# Patient Record
Sex: MALE | Race: WHITE | Employment: PART TIME | URBAN - METROPOLITAN AREA
[De-identification: names, ages, dates, MRNs, and addresses within clinical notes are randomized per-mention and may not be internally consistent; named-entity substitution may affect disease eponyms.]

---

## 2022-11-18 ENCOUNTER — TRANSCRIBE ORDER (OUTPATIENT)
Dept: SCHEDULING | Age: 57
End: 2022-11-18

## 2022-11-18 DIAGNOSIS — K56.699 STRICTURE OF COLON DETERMINED BY ENDOSCOPY (HCC): Primary | ICD-10-CM

## 2022-11-23 ENCOUNTER — HOSPITAL ENCOUNTER (INPATIENT)
Age: 57
LOS: 1 days | Discharge: HOME | DRG: 392 | End: 2022-11-24
Attending: EMERGENCY MEDICINE | Admitting: STUDENT IN AN ORGANIZED HEALTH CARE EDUCATION/TRAINING PROGRAM
Payer: COMMERCIAL

## 2022-11-23 ENCOUNTER — HOSPITAL ENCOUNTER (OUTPATIENT)
Dept: CT IMAGING | Age: 57
Discharge: HOME | End: 2022-11-23
Attending: INTERNAL MEDICINE
Payer: COMMERCIAL

## 2022-11-23 DIAGNOSIS — K57.20 DIVERTICULITIS OF LARGE INTESTINE WITH ABSCESS WITHOUT BLEEDING: Primary | ICD-10-CM

## 2022-11-23 DIAGNOSIS — K56.699 STRICTURE OF COLON DETERMINED BY ENDOSCOPY (HCC): ICD-10-CM

## 2022-11-23 PROBLEM — K57.92 DIVERTICULITIS: Status: ACTIVE | Noted: 2022-11-23

## 2022-11-23 LAB
ALBUMIN SERPL-MCNC: 3.5 G/DL (ref 3.5–5)
ALBUMIN/GLOB SERPL: 0.8 {RATIO} (ref 1.1–2.2)
ALP SERPL-CCNC: 81 U/L (ref 45–117)
ALT SERPL-CCNC: 18 U/L (ref 12–78)
ANION GAP SERPL CALC-SCNC: 9 MMOL/L (ref 5–15)
AST SERPL-CCNC: 20 U/L (ref 15–37)
BASOPHILS # BLD: 0 K/UL (ref 0–0.1)
BASOPHILS NFR BLD: 0 % (ref 0–1)
BILIRUB SERPL-MCNC: 0.4 MG/DL (ref 0.2–1)
BUN SERPL-MCNC: 7 MG/DL (ref 6–20)
BUN/CREAT SERPL: 6 (ref 12–20)
CALCIUM SERPL-MCNC: 9 MG/DL (ref 8.5–10.1)
CHLORIDE SERPL-SCNC: 101 MMOL/L (ref 97–108)
CO2 SERPL-SCNC: 29 MMOL/L (ref 21–32)
CREAT SERPL-MCNC: 1.13 MG/DL (ref 0.7–1.3)
DIFFERENTIAL METHOD BLD: ABNORMAL
EOSINOPHIL # BLD: 0.4 K/UL (ref 0–0.4)
EOSINOPHIL NFR BLD: 4 % (ref 0–7)
ERYTHROCYTE [DISTWIDTH] IN BLOOD BY AUTOMATED COUNT: 12.2 % (ref 11.5–14.5)
GLOBULIN SER CALC-MCNC: 4.6 G/DL (ref 2–4)
GLUCOSE SERPL-MCNC: 95 MG/DL (ref 65–100)
HCT VFR BLD AUTO: 41.6 % (ref 36.6–50.3)
HGB BLD-MCNC: 14 G/DL (ref 12.1–17)
IMM GRANULOCYTES # BLD AUTO: 0.1 K/UL (ref 0–0.04)
IMM GRANULOCYTES NFR BLD AUTO: 1 % (ref 0–0.5)
LYMPHOCYTES # BLD: 1.5 K/UL (ref 0.8–3.5)
LYMPHOCYTES NFR BLD: 16 % (ref 12–49)
MCH RBC QN AUTO: 32.6 PG (ref 26–34)
MCHC RBC AUTO-ENTMCNC: 33.7 G/DL (ref 30–36.5)
MCV RBC AUTO: 97 FL (ref 80–99)
MONOCYTES # BLD: 0.9 K/UL (ref 0–1)
MONOCYTES NFR BLD: 9 % (ref 5–13)
NEUTS SEG # BLD: 6.7 K/UL (ref 1.8–8)
NEUTS SEG NFR BLD: 70 % (ref 32–75)
NRBC # BLD: 0 K/UL (ref 0–0.01)
NRBC BLD-RTO: 0 PER 100 WBC
PLATELET # BLD AUTO: 389 K/UL (ref 150–400)
PMV BLD AUTO: 9.6 FL (ref 8.9–12.9)
POTASSIUM SERPL-SCNC: 4.4 MMOL/L (ref 3.5–5.1)
PROT SERPL-MCNC: 8.1 G/DL (ref 6.4–8.2)
RBC # BLD AUTO: 4.29 M/UL (ref 4.1–5.7)
SODIUM SERPL-SCNC: 139 MMOL/L (ref 136–145)
WBC # BLD AUTO: 9.5 K/UL (ref 4.1–11.1)

## 2022-11-23 PROCEDURE — 74011000250 HC RX REV CODE- 250: Performed by: INTERNAL MEDICINE

## 2022-11-23 PROCEDURE — 36415 COLL VENOUS BLD VENIPUNCTURE: CPT

## 2022-11-23 PROCEDURE — 80053 COMPREHEN METABOLIC PANEL: CPT

## 2022-11-23 PROCEDURE — 74011000258 HC RX REV CODE- 258: Performed by: EMERGENCY MEDICINE

## 2022-11-23 PROCEDURE — 99285 EMERGENCY DEPT VISIT HI MDM: CPT

## 2022-11-23 PROCEDURE — 74011000636 HC RX REV CODE- 636: Performed by: INTERNAL MEDICINE

## 2022-11-23 PROCEDURE — 74011250636 HC RX REV CODE- 250/636: Performed by: EMERGENCY MEDICINE

## 2022-11-23 PROCEDURE — 74177 CT ABD & PELVIS W/CONTRAST: CPT

## 2022-11-23 PROCEDURE — 65270000029 HC RM PRIVATE

## 2022-11-23 PROCEDURE — 85025 COMPLETE CBC W/AUTO DIFF WBC: CPT

## 2022-11-23 PROCEDURE — 96365 THER/PROPH/DIAG IV INF INIT: CPT

## 2022-11-23 RX ORDER — BARIUM SULFATE 20 MG/ML
900 SUSPENSION ORAL ONCE
Status: COMPLETED | OUTPATIENT
Start: 2022-11-23 | End: 2022-11-23

## 2022-11-23 RX ADMIN — IOPAMIDOL 100 ML: 755 INJECTION, SOLUTION INTRAVENOUS at 16:48

## 2022-11-23 RX ADMIN — PIPERACILLIN AND TAZOBACTAM 4.5 G: 4; .5 INJECTION, POWDER, FOR SOLUTION INTRAVENOUS at 18:52

## 2022-11-23 RX ADMIN — BARIUM SULFATE 450 ML: 20 SUSPENSION ORAL at 16:48

## 2022-11-24 VITALS
WEIGHT: 199 LBS | HEIGHT: 69 IN | DIASTOLIC BLOOD PRESSURE: 85 MMHG | RESPIRATION RATE: 18 BRPM | HEART RATE: 75 BPM | SYSTOLIC BLOOD PRESSURE: 132 MMHG | BODY MASS INDEX: 29.47 KG/M2 | OXYGEN SATURATION: 93 % | TEMPERATURE: 98.3 F

## 2022-11-24 LAB
ALBUMIN SERPL-MCNC: 3 G/DL (ref 3.5–5)
ALBUMIN/GLOB SERPL: 0.7 {RATIO} (ref 1.1–2.2)
ALP SERPL-CCNC: 78 U/L (ref 45–117)
ALT SERPL-CCNC: 20 U/L (ref 12–78)
ANION GAP SERPL CALC-SCNC: 5 MMOL/L (ref 5–15)
AST SERPL-CCNC: 14 U/L (ref 15–37)
BASOPHILS # BLD: 0.1 K/UL (ref 0–0.1)
BASOPHILS NFR BLD: 1 % (ref 0–1)
BILIRUB SERPL-MCNC: 0.2 MG/DL (ref 0.2–1)
BUN SERPL-MCNC: 8 MG/DL (ref 6–20)
BUN/CREAT SERPL: 7 (ref 12–20)
CALCIUM SERPL-MCNC: 8.6 MG/DL (ref 8.5–10.1)
CHLORIDE SERPL-SCNC: 107 MMOL/L (ref 97–108)
CO2 SERPL-SCNC: 28 MMOL/L (ref 21–32)
CREAT SERPL-MCNC: 1.12 MG/DL (ref 0.7–1.3)
DIFFERENTIAL METHOD BLD: ABNORMAL
EOSINOPHIL # BLD: 0.5 K/UL (ref 0–0.4)
EOSINOPHIL NFR BLD: 7 % (ref 0–7)
ERYTHROCYTE [DISTWIDTH] IN BLOOD BY AUTOMATED COUNT: 12.2 % (ref 11.5–14.5)
GLOBULIN SER CALC-MCNC: 4.3 G/DL (ref 2–4)
GLUCOSE SERPL-MCNC: 120 MG/DL (ref 65–100)
HCT VFR BLD AUTO: 41.7 % (ref 36.6–50.3)
HGB BLD-MCNC: 13.9 G/DL (ref 12.1–17)
IMM GRANULOCYTES # BLD AUTO: 0 K/UL (ref 0–0.04)
IMM GRANULOCYTES NFR BLD AUTO: 0 % (ref 0–0.5)
LYMPHOCYTES # BLD: 1.4 K/UL (ref 0.8–3.5)
LYMPHOCYTES NFR BLD: 17 % (ref 12–49)
MCH RBC QN AUTO: 32.5 PG (ref 26–34)
MCHC RBC AUTO-ENTMCNC: 33.3 G/DL (ref 30–36.5)
MCV RBC AUTO: 97.4 FL (ref 80–99)
MONOCYTES # BLD: 0.9 K/UL (ref 0–1)
MONOCYTES NFR BLD: 11 % (ref 5–13)
NEUTS SEG # BLD: 5.2 K/UL (ref 1.8–8)
NEUTS SEG NFR BLD: 64 % (ref 32–75)
NRBC # BLD: 0 K/UL (ref 0–0.01)
NRBC BLD-RTO: 0 PER 100 WBC
PLATELET # BLD AUTO: 365 K/UL (ref 150–400)
PMV BLD AUTO: 9.2 FL (ref 8.9–12.9)
POTASSIUM SERPL-SCNC: 4 MMOL/L (ref 3.5–5.1)
PROT SERPL-MCNC: 7.3 G/DL (ref 6.4–8.2)
RBC # BLD AUTO: 4.28 M/UL (ref 4.1–5.7)
SODIUM SERPL-SCNC: 140 MMOL/L (ref 136–145)
WBC # BLD AUTO: 8.2 K/UL (ref 4.1–11.1)

## 2022-11-24 PROCEDURE — 74011250636 HC RX REV CODE- 250/636: Performed by: FAMILY MEDICINE

## 2022-11-24 PROCEDURE — 36415 COLL VENOUS BLD VENIPUNCTURE: CPT

## 2022-11-24 PROCEDURE — 80053 COMPREHEN METABOLIC PANEL: CPT

## 2022-11-24 PROCEDURE — 99221 1ST HOSP IP/OBS SF/LOW 40: CPT | Performed by: SURGERY

## 2022-11-24 PROCEDURE — 85025 COMPLETE CBC W/AUTO DIFF WBC: CPT

## 2022-11-24 PROCEDURE — 74011000258 HC RX REV CODE- 258: Performed by: FAMILY MEDICINE

## 2022-11-24 PROCEDURE — 74011000250 HC RX REV CODE- 250: Performed by: FAMILY MEDICINE

## 2022-11-24 PROCEDURE — 74011250637 HC RX REV CODE- 250/637: Performed by: FAMILY MEDICINE

## 2022-11-24 RX ORDER — LEVOFLOXACIN 750 MG/1
750 TABLET ORAL DAILY
Qty: 7 TABLET | Refills: 0 | Status: SHIPPED | OUTPATIENT
Start: 2022-11-24

## 2022-11-24 RX ORDER — SODIUM CHLORIDE 9 MG/ML
75 INJECTION, SOLUTION INTRAVENOUS CONTINUOUS
Status: DISCONTINUED | OUTPATIENT
Start: 2022-11-24 | End: 2022-11-24 | Stop reason: HOSPADM

## 2022-11-24 RX ORDER — IBUPROFEN 200 MG
1 TABLET ORAL DAILY
Status: DISCONTINUED | OUTPATIENT
Start: 2022-11-24 | End: 2022-11-24 | Stop reason: HOSPADM

## 2022-11-24 RX ORDER — SODIUM CHLORIDE 0.9 % (FLUSH) 0.9 %
5-40 SYRINGE (ML) INJECTION EVERY 8 HOURS
Status: DISCONTINUED | OUTPATIENT
Start: 2022-11-24 | End: 2022-11-24 | Stop reason: HOSPADM

## 2022-11-24 RX ORDER — SODIUM CHLORIDE 0.9 % (FLUSH) 0.9 %
5-40 SYRINGE (ML) INJECTION AS NEEDED
Status: DISCONTINUED | OUTPATIENT
Start: 2022-11-24 | End: 2022-11-24 | Stop reason: HOSPADM

## 2022-11-24 RX ORDER — METRONIDAZOLE 500 MG/1
500 TABLET ORAL 3 TIMES DAILY
Qty: 21 TABLET | Refills: 0 | Status: SHIPPED | OUTPATIENT
Start: 2022-11-24 | End: 2022-12-01

## 2022-11-24 RX ADMIN — SODIUM CHLORIDE 75 ML/HR: 9 INJECTION, SOLUTION INTRAVENOUS at 04:54

## 2022-11-24 RX ADMIN — SODIUM CHLORIDE, PRESERVATIVE FREE 10 ML: 5 INJECTION INTRAVENOUS at 04:58

## 2022-11-24 RX ADMIN — PIPERACILLIN AND TAZOBACTAM 3.38 G: 3; .375 INJECTION, POWDER, FOR SOLUTION INTRAVENOUS at 04:57

## 2022-11-24 NOTE — DISCHARGE INSTRUCTIONS
Discharge Instructions       PATIENT ID: Cuong Lyle  MRN: [id number]   YOB: 1965    DATE OF ADMISSION: 11/23/2022  5:23 PM    DATE OF DISCHARGE: 11/24/22    PRIMARY CARE PROVIDER: Silverio Escalera DO     ATTENDING PHYSICIAN: Sebastien Cason MD   DISCHARGING PROVIDER: Sebastien Cason MD    To contact this individual call [phone] and ask the  to page. If unavailable ask to be transferred the Adult Hospitalist Department. DISCHARGE DIAGNOSES Intraabdominal colonic abscess     CONSULTATIONS:  IP CONSULT TO COLORECTAL SURGERY  IP CONSULT TO INTERVENTIONAL RADIOLOGY    PROCEDURES/SURGERIES: * No surgery found *    PENDING TEST RESULTS:   At the time of discharge the following test results are still pending: None    FOLLOW UP APPOINTMENTS:   Gastroenterology, PCP     ADDITIONAL CARE RECOMMENDATIONS: You were admitted due to finding of an abscess near your colon in setting of recent fevers. Fortunately, you did not have fevers or signs of sepsis during your admission. Your imaging was evaluated by both Surgery and Interventional Radiology, who determined that your abscess is too small to drain. It is possible that the abscess has been present for a while and is actually improving, as your fevers and pain have improved. We feel that you are safe for discharge to home with close follow-up with your GI doctor and PCP. You will be discharged on antibiotics. You need a repeat CT abdomen in 1 week for reassess of the size of your abscess. Of course, if you develop fever, pain, or new symptoms you need to return for immediate evaluation. DIET: Resume previous diet    ACTIVITY: Activity as tolerated and no driving for today    WOUND CARE: None    EQUIPMENT needed: None      DISCHARGE MEDICATIONS:   See Medication Reconciliation Form    It is important that you take the medication exactly as they are prescribed.    Keep your medication in the bottles provided by the pharmacist and keep a list of the medication names, dosages, and times to be taken in your wallet. Do not take other medications without consulting your doctor. NOTIFY YOUR PHYSICIAN FOR ANY OF THE FOLLOWING:   Fever over 101 degrees for 24 hours. Chest pain, shortness of breath, fever, chills, nausea, vomiting, diarrhea, change in mentation, falling, weakness, bleeding. Severe pain or pain not relieved by medications. Or, any other signs or symptoms that you may have questions about.         Signed:   Ester Candelario MD  11/24/2022  12:16 PM

## 2022-11-24 NOTE — CONSULTS
Surgery Consult    Subjective:      Snehal Khan is a 65 y.o. male who is being seen for evaluation of diverticular abscess. The patient states that he was treated empirically for diverticulitis back in August.  He then went for a colonoscopy on 11/8/2022 and was diagnosed with diverticulosis and with some sort of colonic stricture. He states that they were unable to get through the colon. After the colonoscopy he developed abdominal pain. He was then placed on Cipro and Flagyl. He finished a 10-day course of that and was speaking with the gastroenterologist where he noted that he was having intermittent fevers at night. The gastroenterologist told him he needed to go get a CT scan. He had a CT scan yesterday that showed a diverticular abscess. He has never had anything similar to this in the past.        Patient Active Problem List    Diagnosis Date Noted    Diverticulitis 11/23/2022     Past Medical History:   Diagnosis Date    Hypercholesterolemia     Psoriasis       Past Surgical History:   Procedure Laterality Date    HX OTHER SURGICAL      Laparoscopic inguinal hernia repair      Social History     Tobacco Use    Smoking status: Not on file    Smokeless tobacco: Not on file   Substance Use Topics    Alcohol use: Not on file      No family history on file. Current Facility-Administered Medications   Medication Dose Route Frequency    sodium chloride (NS) flush 5-40 mL  5-40 mL IntraVENous Q8H    sodium chloride (NS) flush 5-40 mL  5-40 mL IntraVENous PRN    piperacillin-tazobactam (ZOSYN) 3.375 g in 0.9% sodium chloride (MBP/ADV) 100 mL MBP  3.375 g IntraVENous Q8H    0.9% sodium chloride infusion  75 mL/hr IntraVENous CONTINUOUS    nicotine (NICODERM CQ) 21 mg/24 hr patch 1 Patch  1 Patch TransDERmal DAILY      No Known Allergies    Review of Systems:    Pertinent items are noted in the History of Present Illness.     Objective:      Visit Vitals  /85   Pulse 75   Temp 98.3 °F (36.8 °C)   Resp 18   Ht 5' 9\" (1.753 m)   Wt 90.3 kg (199 lb)   SpO2 93%   BMI 29.39 kg/m²       Physical Exam:  GENERAL: alert, cooperative, no distress, appears stated age, EYE: negative, THROAT & NECK: normal, LUNG: clear to auscultation bilaterally, HEART: regular rate and rhythm, ABDOMEN: Soft with mild tenderness left lower quadrant no rebound or guarding, EXTREMITIES:  no edema, SKIN: Normal., NEUROLOGIC: negative, PSYCH: non focal    Imaging:  images and reports reviewed  CT- IMPRESSION  Colonic diverticulitis with associated left iliac fossa pericolonic abscess  along the proximal sigmoid colon measuring up to 2.7 x 3.3 cm. The patient was advised to present to the ED for immediate evaluation  Lab/Data Review: All lab results for the last 24 hours reviewed. Recent Results (from the past 24 hour(s))   CBC WITH AUTOMATED DIFF    Collection Time: 11/23/22  6:54 PM   Result Value Ref Range    WBC 9.5 4.1 - 11.1 K/uL    RBC 4.29 4. 10 - 5.70 M/uL    HGB 14.0 12.1 - 17.0 g/dL    HCT 41.6 36.6 - 50.3 %    MCV 97.0 80.0 - 99.0 FL    MCH 32.6 26.0 - 34.0 PG    MCHC 33.7 30.0 - 36.5 g/dL    RDW 12.2 11.5 - 14.5 %    PLATELET 532 696 - 772 K/uL    MPV 9.6 8.9 - 12.9 FL    NRBC 0.0 0  WBC    ABSOLUTE NRBC 0.00 0.00 - 0.01 K/uL    NEUTROPHILS 70 32 - 75 %    LYMPHOCYTES 16 12 - 49 %    MONOCYTES 9 5 - 13 %    EOSINOPHILS 4 0 - 7 %    BASOPHILS 0 0 - 1 %    IMMATURE GRANULOCYTES 1 (H) 0.0 - 0.5 %    ABS. NEUTROPHILS 6.7 1.8 - 8.0 K/UL    ABS. LYMPHOCYTES 1.5 0.8 - 3.5 K/UL    ABS. MONOCYTES 0.9 0.0 - 1.0 K/UL    ABS. EOSINOPHILS 0.4 0.0 - 0.4 K/UL    ABS. BASOPHILS 0.0 0.0 - 0.1 K/UL    ABS. IMM.  GRANS. 0.1 (H) 0.00 - 0.04 K/UL    DF AUTOMATED     METABOLIC PANEL, COMPREHENSIVE    Collection Time: 11/23/22  6:54 PM   Result Value Ref Range    Sodium 139 136 - 145 mmol/L    Potassium 4.4 3.5 - 5.1 mmol/L    Chloride 101 97 - 108 mmol/L    CO2 29 21 - 32 mmol/L    Anion gap 9 5 - 15 mmol/L    Glucose 95 65 - 100 mg/dL    BUN 7 6 - 20 MG/DL    Creatinine 1.13 0.70 - 1.30 MG/DL    BUN/Creatinine ratio 6 (L) 12 - 20      eGFR >60 >60 ml/min/1.73m2    Calcium 9.0 8.5 - 10.1 MG/DL    Bilirubin, total 0.4 0.2 - 1.0 MG/DL    ALT (SGPT) 18 12 - 78 U/L    AST (SGOT) 20 15 - 37 U/L    Alk. phosphatase 81 45 - 117 U/L    Protein, total 8.1 6.4 - 8.2 g/dL    Albumin 3.5 3.5 - 5.0 g/dL    Globulin 4.6 (H) 2.0 - 4.0 g/dL    A-G Ratio 0.8 (L) 1.1 - 2.2     METABOLIC PANEL, COMPREHENSIVE    Collection Time: 11/24/22  3:16 AM   Result Value Ref Range    Sodium 140 136 - 145 mmol/L    Potassium 4.0 3.5 - 5.1 mmol/L    Chloride 107 97 - 108 mmol/L    CO2 28 21 - 32 mmol/L    Anion gap 5 5 - 15 mmol/L    Glucose 120 (H) 65 - 100 mg/dL    BUN 8 6 - 20 MG/DL    Creatinine 1.12 0.70 - 1.30 MG/DL    BUN/Creatinine ratio 7 (L) 12 - 20      eGFR >60 >60 ml/min/1.73m2    Calcium 8.6 8.5 - 10.1 MG/DL    Bilirubin, total 0.2 0.2 - 1.0 MG/DL    ALT (SGPT) 20 12 - 78 U/L    AST (SGOT) 14 (L) 15 - 37 U/L    Alk. phosphatase 78 45 - 117 U/L    Protein, total 7.3 6.4 - 8.2 g/dL    Albumin 3.0 (L) 3.5 - 5.0 g/dL    Globulin 4.3 (H) 2.0 - 4.0 g/dL    A-G Ratio 0.7 (L) 1.1 - 2.2     CBC WITH AUTOMATED DIFF    Collection Time: 11/24/22  3:16 AM   Result Value Ref Range    WBC 8.2 4.1 - 11.1 K/uL    RBC 4.28 4.10 - 5.70 M/uL    HGB 13.9 12.1 - 17.0 g/dL    HCT 41.7 36.6 - 50.3 %    MCV 97.4 80.0 - 99.0 FL    MCH 32.5 26.0 - 34.0 PG    MCHC 33.3 30.0 - 36.5 g/dL    RDW 12.2 11.5 - 14.5 %    PLATELET 284 790 - 783 K/uL    MPV 9.2 8.9 - 12.9 FL    NRBC 0.0 0  WBC    ABSOLUTE NRBC 0.00 0.00 - 0.01 K/uL    NEUTROPHILS 64 32 - 75 %    LYMPHOCYTES 17 12 - 49 %    MONOCYTES 11 5 - 13 %    EOSINOPHILS 7 0 - 7 %    BASOPHILS 1 0 - 1 %    IMMATURE GRANULOCYTES 0 0.0 - 0.5 %    ABS. NEUTROPHILS 5.2 1.8 - 8.0 K/UL    ABS. LYMPHOCYTES 1.4 0.8 - 3.5 K/UL    ABS. MONOCYTES 0.9 0.0 - 1.0 K/UL    ABS. EOSINOPHILS 0.5 (H) 0.0 - 0.4 K/UL    ABS. BASOPHILS 0.1 0.0 - 0.1 K/UL    ABS. IMM. GRANS. 0.0 0.00 - 0.04 K/UL    DF AUTOMATED         Assessment:plan    81-year-old male with diverticular abscess. Unclear exactly when this started. He states that he was feeling well prior to his colonoscopy and then developed pain afterwards. It is possible that he had a perforated tic during the procedure that led to the diverticulitis. He has not had a CT scan before last night. It is unclear when this abscess began and whether or not it is getting better or worse. Have discussed with interventional radiology and this is too small to drain. Would keep patient on antibiotics though probably can be on oral and repeat his CT scan in about a week assuming that he is not more symptomatic to see whether or not it is resolving.   There are no plans for operative intervention at this time  Call if needed

## 2022-11-24 NOTE — PROGRESS NOTES
DC paperwork reviewed, had no questions or concerns. PIV x2 removed. Meds e-scribed.  Waiting on wife to arrive,

## 2022-11-24 NOTE — ED PROVIDER NOTES
[facility]   15-year-old man who presented to the emergency department due to concern for a diverticular abscess. Patient underwent a colonoscopy on the eighth. He was diagnosed with a stricture. After colonoscopy he developed abdominal pain. He completed 10-day course of Cipro and Flagyl after being seen by his PCP. He finished a 10-day course of of Cipro and Flagyl. His GI doctor ordered a CT scan that showed a diverticular abscess. His abdominal pain is improved compared to his initial symptoms. No vomiting. No diarrhea or blood in his stool. No past medical history on file. No past surgical history on file. No family history on file. Social History     Socioeconomic History    Marital status:      Spouse name: Not on file    Number of children: Not on file    Years of education: Not on file    Highest education level: Not on file   Occupational History    Not on file   Tobacco Use    Smoking status: Not on file    Smokeless tobacco: Not on file   Substance and Sexual Activity    Alcohol use: Not on file    Drug use: Not on file    Sexual activity: Not on file   Other Topics Concern    Not on file   Social History Narrative    Not on file     Social Determinants of Health     Financial Resource Strain: Not on file   Food Insecurity: Not on file   Transportation Needs: Not on file   Physical Activity: Not on file   Stress: Not on file   Social Connections: Not on file   Intimate Partner Violence: Not on file   Housing Stability: Not on file         ALLERGIES: Patient has no known allergies.     Review of Systems  A complete review of systems was performed and all systems reviewed are negative unless otherwise documented in the [facility]  Vitals:    11/23/22 1730 11/23/22 1902   BP: (!) 156/98 (!) 140/88   Pulse: 88 84   Resp: 18 16   Temp: 97.9 °F (36.6 °C) 98.6 °F (37 °C)   SpO2: 99% 97%   Weight: 90.3 kg (199 lb)    Height: 5' 9\" (1.753 m)             Physical Exam  Constitutional:       Comments: Appears well. No acute distress noted   HENT:      Mouth/Throat:      Comments: Moist mucous membranes  Eyes:      General: No scleral icterus. Extraocular Movements: Extraocular movements intact. Neck:      Comments: Trachea midline  Cardiovascular:      Comments: Regular rate and rhythm  Pulmonary:      Effort: Pulmonary effort is normal. No respiratory distress. Breath sounds: Normal breath sounds. No wheezing or rales. Abdominal:      General: There is no distension. Palpations: Abdomen is soft. Comments: Mild lower abdominal tenderness. No rebound or guarding   Musculoskeletal:         General: No deformity. Normal range of motion. Cervical back: Normal range of motion. Skin:     General: Skin is warm and dry. Neurological:      Comments: Awake and alert. GCS 15        MDM  Patient presents with the above chief complaint. Vitals are stable. Nontoxic on exam.  Minimal abdominal tenderness. Labs are reassuring. Procedures      Perfect Serve Consult for Admission  7:35 PM    ED Room Number: SER05/05  Patient Name and age:  Bobbi Maradiaga 65 y.o.  male  Working Diagnosis:   1. Diverticulitis of large intestine with abscess without bleeding        COVID-19 Suspicion:  no  Sepsis present:  no  Reassessment needed: yes  Code Status:  Full Code  Readmission: no  Isolation Requirements:  no  Recommended Level of Care:  med/surg  Department: [facility] - [phone]      Other: Patient had a colonoscopy on the eighth where they found a stricture. He had abdominal pain after that. [facility] Cipro and Flagyl. Followed up with GI again and he was found to have diverticulitis with abscess. Pain is much better than it was initially. No fevers. Not septic. Discussed with Adilene who recommended IR consult, IV antibiotics ordered.

## 2022-11-24 NOTE — ED NOTES
TRANSFER - OUT REPORT:    Verbal report given to Awilda King RN (name) on Ole Villeda  being transferred to [address] [place] [address] room  (unit) for routine progression of care       Report consisted of patients Situation, Background, Assessment and   Recommendations(SBAR). Information from the following report(s) SBAR, ED Summary, MAR, and Recent Results was reviewed with the receiving nurse. Lines:   Peripheral IV 11/23/22 Anterior;Left;Proximal Forearm (Active)       Peripheral IV 11/23/22 Right Antecubital (Active)   Site Assessment Clean, dry, & intact 11/23/22 1853   Phlebitis Assessment 0 11/23/22 1853   Infiltration Assessment 0 11/23/22 1853   Dressing Status Clean, dry, & intact 11/23/22 1853   Dressing Type Transparent 11/23/22 1853   Hub Color/Line Status Pink 11/23/22 1853        Opportunity for questions and clarification was provided.       Patient transported with:

## 2022-11-24 NOTE — DISCHARGE SUMMARY
Discharge Summary       PATIENT ID: Nara Pérez  MRN: [id number]   YOB: 1965    DATE OF ADMISSION: 11/23/2022  5:23 PM    DATE OF DISCHARGE: 11/24/22    PRIMARY CARE PROVIDER: Buzzy Merlin, DO     ATTENDING PHYSICIAN: Noemí Ahn MD   DISCHARGING PROVIDER: Noemí Ahn MD    To contact this individual call [phone] and ask the  to page. If unavailable ask to be transferred the Adult Hospitalist Department. CONSULTATIONS: IP CONSULT TO COLORECTAL SURGERY    PROCEDURES/SURGERIES: * No surgery found *    ADMITTING DIAGNOSES & HOSPITAL COURSE:   Diverticulitis of large intestine with abscess without bleeding  Abdominal pain, left lower quadrant  Hyperlipidemia  Tobacco abuse    Nara Pérez is a 65 y.o. male who presented after outpatient CT abdomen demonstrated colonic abscess along the left iliac fossa. Patient was evaluated by general surgeon Dr. Ez Barkley, who recommended evaluation by interventional radiology; both Dr. Ryan Hyman and interventional radiology agreed that the abscess was too small to drain and therefore no surgical intervention was necessary. Furthermore, patient was monitored overnight without fever or development of leukocytosis, and without worsening abdominal pain. It was felt that given patient's fever curve had improved on oral antibiotics as an outpatient, it is possible that his abscess has actually been improving. Patient reported that his pain was improved from previous, and minimal.  Will discharge patient on additional 1 week course of antibiotics with Flagyl and Levaquin. Recommend repeat CT abdomen within 1 week to ensure continued improvement. Strict return precautions given to patient, who is amenable to this plan. Recommend follow-up with gastroenterology. DISCHARGE DIAGNOSES / PLAN:      Diverticulitis of large intestine with abscess without bleeding: Discharged with additional week of Levaquin and Flagyl.   No surgical intervention required at this time. Recommend repeat CT abdomen pelvis in 1 week for comparison. Follow-up with gastroenterology and primary care physician  Impassible strict of sigmoid colon: follow-up with colorectal surgery, office of Dr Kristi Lombard will schedule  Abdominal pain: Minimal and improving  Hyperlipidemia  Tobacco abuse       PENDING TEST RESULTS:   At the time of discharge the following test results are still pending: none    FOLLOW UP APPOINTMENTS:    Follow-up Information       Follow up With Specialties Details Why Contact Info    Giovana Hoang, [address] [address] [place] [place] [address] [phone]             ADDITIONAL CARE RECOMMENDATIONS: You were admitted due to finding of an abscess near your colon in setting of recent fevers. Fortunately, you did not have fevers or signs of sepsis during your admission. Your imaging was evaluated by both Surgery and Interventional Radiology, who determined that your abscess is too small to drain. It is possible that the abscess has been present for a while and is actually improving, as your fevers and pain have improved. We feel that you are safe for discharge to home with close follow-up with your GI doctor and PCP. You will be discharged on antibiotics. You need a repeat CT abdomen in 1 week for reassess of the size of your abscess. Of course, if you develop fever, pain, or new symptoms you need to return for immediate evaluation. DIET: Resume previous diet    ACTIVITY: Activity as tolerated and no driving for today    WOUND CARE: None    EQUIPMENT needed: None    DISCHARGE MEDICATIONS:  Discharge Medication List as of 11/24/2022  1:06 PM        START taking these medications    Details   levoFLOXacin (Levaquin) 750 mg tablet Take 1 Tablet by mouth daily. , Normal, Disp-7 Tablet, R-0      metroNIDAZOLE (FlagyL) 500 mg tablet Take 1 Tablet by mouth three (3) times daily for 7 days. , Normal, Disp-21 Tablet, R-0               NOTIFY YOUR PHYSICIAN FOR ANY OF THE FOLLOWING:   Fever over 101 degrees for 24 hours. Chest pain, shortness of breath, fever, chills, nausea, vomiting, diarrhea, change in mentation, falling, weakness, bleeding. Severe pain or pain not relieved by medications. Or, any other signs or symptoms that you may have questions about. DISPOSITION:  x  Home With:   OT  PT  HH  RN       Long term SNF/Inpatient Rehab    Independent/assisted living    Hospice    Other:       PATIENT CONDITION AT DISCHARGE:     Functional status    Poor     Deconditioned    x Independent      Cognition    x Lucid     Forgetful     Dementia      Catheters/lines (plus indication)    Cuellar     PICC     PEG    x None      Code status   x  Full code     DNR      PHYSICAL EXAMINATION AT DISCHARGE:  Visit Vitals  /85   Pulse 75   Temp 98.3 °F (36.8 °C)   Resp 18   Ht 5' 9\" (1.753 m)   Wt 90.3 kg (199 lb)   SpO2 93%   BMI 29.39 kg/m²        Constitutional:  No acute distress, cooperative, pleasant    ENT:  Oral mucosa moist, oropharynx benign. Resp:  CTA bilaterally. No wheezing/rhonchi/rales. No accessory muscle use. CV:  Regular rhythm, normal rate, no murmurs, gallops, rubs    GI:  Soft, non distended, non tender. normoactive bowel sounds, no hepatosplenomegaly     Musculoskeletal:  No edema, warm, 2+ pulses throughout    Neurologic:  Moves all extremities.   AAOx3, CN II-XII reviewed           CHRONIC MEDICAL DIAGNOSES:  Problem List as of 11/24/2022 Never Reviewed            Codes Class Noted - Resolved    Diverticulitis ICD-10-CM: S80.18  ICD-9-CM: 562.11  11/23/2022 - Present           Greater than 30 minutes were spent with the patient on counseling and coordination of care    Signed:   Noemí Ahn MD  11/24/2022  12:16 PM

## 2022-11-24 NOTE — H&P
[address] [facility] Adult  Hospitalist Group  History and Physical    Date of Service:  11/24/2022  Primary Care Provider: Justina Crane DO  Source of information: The patient and Chart review    Chief Complaint: Abnormal CT Scan      History of Presenting Illness:   Mary Lou Christianson is a 65 y.o. male with past medical history of diverticulosis and hyperlipidemia presented as a direct admission/transfer from [facility] ([facility]) to [facility] with chief complaint of left lower quadrant abdominal pain, intermittent fever, and abnormal CT result. Patient reportedly had left lower quadrant abdominal pain intermittent fever starting a few weeks ago. He notably had a colonoscopy performed results of which are not known. He had follow-up with his PCP and was started on Cipro and Flagyl which she reportedly finished a 10-day course yesterday. Yesterday on 11/23/2022, patient had CT abdomen pelvis with IV contrast which showed colonic diverticulitis associated with left iliac fossa pericolonic abscess measuring approximately 2.7 cm x 3.3 cm along the proximal sigmoid colon. Patient initially went to [facility], where initial vital signs were temperature 97.9° F, /90, heart rate 80, respiratory rate 18, O2 saturation 99% room air. Patient was started on Zosyn 4.5 g IV x1 dose. Patient was then transferred to [facility] and now is seen for admission to the hospitalist service. At current patient does not complain of any nausea, vomiting, diarrhea, dysuria, hematuria, melena or other constitutional symptoms other than those noted. REVIEW OF SYSTEMS:  Pertinent items are noted in the History of Present Illness.      PAST MEDICAL HISTORY:  Diverticulosis  Hyperlipidemia    Medications:  Prior to Admission medications    Not on File     ALLERGIES:  No known drug allergies    SOCIAL HISTORY:  Smoking:  current; occasional cigarettes (undisclosed amount)  Alcohol:  occasional  Illicit drugs:  denies    FAMILY HISTORY:  CAD     father  Diverticulitis sister  Crohn's disease brother      Objective:   Visit Vitals  BP (!) 150/55 (BP 1 Location: Left upper arm, BP Patient Position: At rest;Lying)   Pulse 69   Temp 98.1 °F (36.7 °C)   Resp 20   Ht 5' 9\" (1.753 m)   Wt 90.3 kg (199 lb)   SpO2 95%   BMI 29.39 kg/m²      O2 Device: None (Room air)    PHYSICAL EXAM:   General:  Patient is in no acute respiratory distress. Head:  Normocephalic, without obvious abnormality, atraumatic   Eyes:  Conjunctivae/corneas clear. Pupils 2 mm reactive bilateral.   E/N/M/T: Nares normal. Septum midline. No nasal drainage or sinus tenderness  Tongue midline/ non-edematous  Clear oropharynx   Neck: Normal appearance and movements, symmetrical, trachea midline  No palpable adenopathy  No thyroid enlargement, tenderness or nodules  No carotid bruit   No JVD  Trachea midline   Lungs:   Symmetrical chest expansion and respiratory effort  Clear to auscultation bilaterally   Chest wall:  No tenderness or deformity   Heart:  Regular rate and rhythm   Normal S1 and S2; no murmur, click, rub or gallop   Abdomen:   Soft, LLQ tenderness  No rebound, guarding, or rigidity  Non-distended   Bowel sounds normal  No masses or hepatosplenomegaly  No hernias present   Back: No costovertebral angle tenderness  No step-off deformity   Extremities: Extremities normal, atraumatic  No cyanosis or edema     Vascular/  Pulses: 2+ radial/ 1+ DP bilateral pulses   Integument/  Skin: No rashes or ulcers  Warm and dry   Musculo-      skeletal: Gait not tested  No calf tenderness   Neuro: GCS 15. Moves all extremities x 4. No slurred speech. No facial droop. Sensation grossly intact. Psych: Alert, oriented x 3. Flat affect. Data Review: All diagnostic labs and studies have been reviewed.     Abnormal Labs Reviewed   CBC WITH AUTOMATED DIFF - Abnormal; Notable for the following components:       Result Value    IMMATURE GRANULOCYTES 1 (*)     ABS. IMM. GRANS. 0.1 (*)     All other components within normal limits   METABOLIC PANEL, COMPREHENSIVE - Abnormal; Notable for the following components:    BUN/Creatinine ratio 6 (*)     Globulin 4.6 (*)     A-G Ratio 0.8 (*)     All other components within normal limits       All Micro Results       None            IMAGING:   No orders to display        ECG/ECHO:  No results found for this or any previous visit. Assessment:   Given the patient's current clinical presentation, there is a high level of concern for decompensation if discharged from the emergency department. Complex decision making was performed, which includes reviewing the patient's available past medical records, laboratory results, and imaging studies. Active Problems:      Diverticulitis of large intestine with abscess without bleeding  -Colonic diverticulitis associated with left iliac fossa pericolonic abscess measuring 2.7 cm x 3.3 cm along proximal sigmoid colon  -Consulted gastroenterologist on-call  -Consult to colorectal surgeon on-call  -Consult IR for possible drainage  -Continue Zosyn 3.375 g IV every 8 hours    2. Abdominal pain, left lower quadrant  -Provide pain management while inpatient as needed  -IV fluids and n.p.o.    3. Hyperlipidemia  -takes statin at home but patient does not know the name of medication  -Once medication list is obtained, and patient was restarted back on diet, resume home medication    4. Tobacco abuse  -Encourage smoking cessation  -Order nicotine patch      DIET: DIET NPO   ISOLATION PRECAUTIONS: There are currently no Active Isolations  CODE STATUS: Full Code   DVT PROPHYLAXIS: SCDs  FUNCTIONAL STATUS PRIOR TO HOSPITALIZATION: Fully active and ambulatory; able to carry on all self-care without restriction.   Ambulatory status/function: By self   EARLY MOBILITY ASSESSMENT: Recommend routine ambulation while hospitalized with the assistance of nursing staff  ANTICIPATED DISCHARGE: Greater than 48 hours. ANTICIPATED DISPOSITION: Home with [address] CONTACT/SURROGATE DECISION MAKER: Freya Taylor, wife [phone]. CRITICAL CARE WAS PERFORMED FOR THIS ENCOUNTER: NO.    ADVANCED DIRECTIVE/ CODE STATUS:  FULL CODE as per discussion with patient. Signed By: Alfonso Coffman MD     November 24, 2022         Please note that this dictation may have been completed with Dragon, the computer voice recognition software. Quite often unanticipated grammatical, syntax, homophones, and other interpretive errors are inadvertently transcribed by the computer software. Please disregard these errors. Please excuse any errors that have escaped final proofreading.

## 2022-11-24 NOTE — CONSULTS
Colon and Rectal Surgery History and Physical    Subjective:      Kamryn Christine is a 65 y.o. male with a history of diverticulitis in August 2022 managed with antibiotics. Recommended after that attack to have colonoscopy. Underwent colonoscopy 11/8 which per pt showed an impassable stricture in the sigmoid colon (biopsy results benign). A few days after colonoscopy, he began having some lower abd pain and bloating. Saw his PCP who ordered CBC, had elevated WBC, was rx'd a course of cipro and flagyl which he finished 2 days ago. He has been feeling much better. During this time, his GI doctor recommended a CT scan to eval the area of stricture. He presented yesterday for that and on scan was found to have diverticulitis with small 3cm pericolonic abscess. He was sent immediately to the ER for evaluation and admitted. WBC normal, no left shift. Today, feels very well. Only a little \"sore\" lower abd but no real pain. Having flatus and Bms, last BM this morning was soft and brown. No blood. No n/v. Tolerating diet. Patient Active Problem List    Diagnosis Date Noted    Diverticulitis 11/23/2022     Past Medical History:   Diagnosis Date    Hypercholesterolemia     Psoriasis       Past Surgical History:   Procedure Laterality Date    HX OTHER SURGICAL      Laparoscopic inguinal hernia repair      Social History     Tobacco Use    Smoking status: Not on file    Smokeless tobacco: Not on file   Substance Use Topics    Alcohol use: Not on file      No family history on file. Prior to Admission medications    Medication Sig Start Date End Date Taking? Authorizing Provider   levoFLOXacin (Levaquin) 750 mg tablet Take 1 Tablet by mouth daily. 11/24/22  Yes Jez Luevano MD   metroNIDAZOLE (FlagyL) 500 mg tablet Take 1 Tablet by mouth three (3) times daily for 7 days.  11/24/22 12/1/22 Yes Jez Luevano MD     No Known Allergies     Review of Systems:    A comprehensive review of systems was negative except for that written in the History of Present Illness. Objective:     Visit Vitals  /85   Pulse 75   Temp 98.3 °F (36.8 °C)   Resp 18   Ht 5' 9\" (1.753 m)   Wt 90.3 kg (199 lb)   SpO2 93%   BMI 29.39 kg/m²        Physical Exam:   Visit Vitals  /85   Pulse 75   Temp 98.3 °F (36.8 °C)   Resp 18   Ht 5' 9\" (1.753 m)   Wt 90.3 kg (199 lb)   SpO2 93%   BMI 29.39 kg/m²     General:  Alert, cooperative, no distress, appears stated age. Head:  Normocephalic, without obvious abnormality, atraumatic. Eyes:  Conjunctivae/corneas clear. PERRL, EOMs intact. Fundi benign   Ears:  Normal TMs and external ear canals both ears. Nose: Nares normal. Septum midline. Mucosa normal. No drainage or sinus tenderness. Throat: Lips, mucosa, and tongue normal. Teeth and gums normal.   Neck: Supple, symmetrical, trachea midline, no adenopathy, thyroid: no enlargement/tenderness/nodules, no carotid bruit and no JVD. Back:   Symmetric, no curvature. ROM normal. No CVA tenderness. Lungs:   Clear to auscultation bilaterally. Chest wall:  No tenderness or deformity. Heart:  Regular rate and rhythm, S1, S2 normal, no murmur, click, rub or gallop. Abdomen:   Soft, completely nontender, nondistended    Extremities: Extremities normal, atraumatic, no cyanosis or edema. Pulses: 2+ and symmetric all extremities. Skin: Skin color, texture, turgor normal. No rashes or lesions   Lymph nodes: Cervical, supraclavicular, and axillary nodes normal.   Neurologic: CNII-XII intact. Normal strength, sensation and reflexes throughout. Imaging:  images and reports reviewed    Lab Review:    Recent Results (from the past 24 hour(s))   CBC WITH AUTOMATED DIFF    Collection Time: 11/23/22  6:54 PM   Result Value Ref Range    WBC 9.5 4.1 - 11.1 K/uL    RBC 4.29 4. 10 - 5.70 M/uL    HGB 14.0 12.1 - 17.0 g/dL    HCT 41.6 36.6 - 50.3 %    MCV 97.0 80.0 - 99.0 FL    MCH 32.6 26.0 - 34.0 PG    MCHC 33.7 30.0 - 36.5 g/dL    RDW 12.2 11.5 - 14.5 %    PLATELET 572 088 - 528 K/uL    MPV 9.6 8.9 - 12.9 FL    NRBC 0.0 0  WBC    ABSOLUTE NRBC 0.00 0.00 - 0.01 K/uL    NEUTROPHILS 70 32 - 75 %    LYMPHOCYTES 16 12 - 49 %    MONOCYTES 9 5 - 13 %    EOSINOPHILS 4 0 - 7 %    BASOPHILS 0 0 - 1 %    IMMATURE GRANULOCYTES 1 (H) 0.0 - 0.5 %    ABS. NEUTROPHILS 6.7 1.8 - 8.0 K/UL    ABS. LYMPHOCYTES 1.5 0.8 - 3.5 K/UL    ABS. MONOCYTES 0.9 0.0 - 1.0 K/UL    ABS. EOSINOPHILS 0.4 0.0 - 0.4 K/UL    ABS. BASOPHILS 0.0 0.0 - 0.1 K/UL    ABS. IMM. GRANS. 0.1 (H) 0.00 - 0.04 K/UL    DF AUTOMATED     METABOLIC PANEL, COMPREHENSIVE    Collection Time: 11/23/22  6:54 PM   Result Value Ref Range    Sodium 139 136 - 145 mmol/L    Potassium 4.4 3.5 - 5.1 mmol/L    Chloride 101 97 - 108 mmol/L    CO2 29 21 - 32 mmol/L    Anion gap 9 5 - 15 mmol/L    Glucose 95 65 - 100 mg/dL    BUN 7 6 - 20 MG/DL    Creatinine 1.13 0.70 - 1.30 MG/DL    BUN/Creatinine ratio 6 (L) 12 - 20      eGFR >60 >60 ml/min/1.73m2    Calcium 9.0 8.5 - 10.1 MG/DL    Bilirubin, total 0.4 0.2 - 1.0 MG/DL    ALT (SGPT) 18 12 - 78 U/L    AST (SGOT) 20 15 - 37 U/L    Alk. phosphatase 81 45 - 117 U/L    Protein, total 8.1 6.4 - 8.2 g/dL    Albumin 3.5 3.5 - 5.0 g/dL    Globulin 4.6 (H) 2.0 - 4.0 g/dL    A-G Ratio 0.8 (L) 1.1 - 2.2     METABOLIC PANEL, COMPREHENSIVE    Collection Time: 11/24/22  3:16 AM   Result Value Ref Range    Sodium 140 136 - 145 mmol/L    Potassium 4.0 3.5 - 5.1 mmol/L    Chloride 107 97 - 108 mmol/L    CO2 28 21 - 32 mmol/L    Anion gap 5 5 - 15 mmol/L    Glucose 120 (H) 65 - 100 mg/dL    BUN 8 6 - 20 MG/DL    Creatinine 1.12 0.70 - 1.30 MG/DL    BUN/Creatinine ratio 7 (L) 12 - 20      eGFR >60 >60 ml/min/1.73m2    Calcium 8.6 8.5 - 10.1 MG/DL    Bilirubin, total 0.2 0.2 - 1.0 MG/DL    ALT (SGPT) 20 12 - 78 U/L    AST (SGOT) 14 (L) 15 - 37 U/L    Alk.  phosphatase 78 45 - 117 U/L    Protein, total 7.3 6.4 - 8.2 g/dL    Albumin 3.0 (L) 3.5 - 5.0 g/dL    Globulin 4.3 (H) 2.0 - 4.0 g/dL A-G Ratio 0.7 (L) 1.1 - 2.2     CBC WITH AUTOMATED DIFF    Collection Time: 11/24/22  3:16 AM   Result Value Ref Range    WBC 8.2 4.1 - 11.1 K/uL    RBC 4.28 4.10 - 5.70 M/uL    HGB 13.9 12.1 - 17.0 g/dL    HCT 41.7 36.6 - 50.3 %    MCV 97.4 80.0 - 99.0 FL    MCH 32.5 26.0 - 34.0 PG    MCHC 33.3 30.0 - 36.5 g/dL    RDW 12.2 11.5 - 14.5 %    PLATELET 935 306 - 374 K/uL    MPV 9.2 8.9 - 12.9 FL    NRBC 0.0 0  WBC    ABSOLUTE NRBC 0.00 0.00 - 0.01 K/uL    NEUTROPHILS 64 32 - 75 %    LYMPHOCYTES 17 12 - 49 %    MONOCYTES 11 5 - 13 %    EOSINOPHILS 7 0 - 7 %    BASOPHILS 1 0 - 1 %    IMMATURE GRANULOCYTES 0 0.0 - 0.5 %    ABS. NEUTROPHILS 5.2 1.8 - 8.0 K/UL    ABS. LYMPHOCYTES 1.4 0.8 - 3.5 K/UL    ABS. MONOCYTES 0.9 0.0 - 1.0 K/UL    ABS. EOSINOPHILS 0.5 (H) 0.0 - 0.4 K/UL    ABS. BASOPHILS 0.1 0.0 - 0.1 K/UL    ABS. IMM.  GRANS. 0.0 0.00 - 0.04 K/UL    DF AUTOMATED         Labs and radiology: images and reports reviewed      Assessment:     65 y.o. M with hx of diverticulitis now with likely diverticular stricture and ongoing diverticulitis following colonoscopy, clinically doing very well    Plan:     - from my standpoint, ok for discharge home on oral antibiotics  - continue daily fiber and BID miralax  - my office will contact him for a follow up appointment with me to discuss colectomy given stricture    Constanza Lundberg MD   [facility]  [phone]

## 2022-12-07 ENCOUNTER — TRANSCRIBE ORDER (OUTPATIENT)
Dept: SCHEDULING | Age: 57
End: 2022-12-07

## 2022-12-07 DIAGNOSIS — K57.92 DIVERTICULITIS: Primary | ICD-10-CM

## 2022-12-08 ENCOUNTER — HOSPITAL ENCOUNTER (INPATIENT)
Age: 57
LOS: 15 days | Discharge: HOME | DRG: 330 | End: 2022-12-23
Attending: COLON & RECTAL SURGERY | Admitting: COLON & RECTAL SURGERY
Payer: COMMERCIAL

## 2022-12-08 ENCOUNTER — APPOINTMENT (OUTPATIENT)
Dept: CT IMAGING | Age: 57
DRG: 330 | End: 2022-12-08
Attending: EMERGENCY MEDICINE
Payer: COMMERCIAL

## 2022-12-08 DIAGNOSIS — T38.0X5A STEROID-INDUCED HYPERGLYCEMIA: ICD-10-CM

## 2022-12-08 DIAGNOSIS — Z78.9 ON TOTAL PARENTERAL NUTRITION (TPN): ICD-10-CM

## 2022-12-08 DIAGNOSIS — R73.9 STEROID-INDUCED HYPERGLYCEMIA: ICD-10-CM

## 2022-12-08 DIAGNOSIS — K57.92 DIVERTICULITIS: Primary | ICD-10-CM

## 2022-12-08 LAB
ALBUMIN SERPL-MCNC: 3.3 G/DL (ref 3.5–5)
ALBUMIN/GLOB SERPL: 0.6 {RATIO} (ref 1.1–2.2)
ALP SERPL-CCNC: 89 U/L (ref 45–117)
ALT SERPL-CCNC: 19 U/L (ref 12–78)
ANION GAP SERPL CALC-SCNC: 4 MMOL/L (ref 5–15)
AST SERPL-CCNC: 12 U/L (ref 15–37)
BASOPHILS # BLD: 0 K/UL (ref 0–0.1)
BASOPHILS NFR BLD: 0 % (ref 0–1)
BILIRUB SERPL-MCNC: 0.6 MG/DL (ref 0.2–1)
BUN SERPL-MCNC: 8 MG/DL (ref 6–20)
BUN/CREAT SERPL: 9 (ref 12–20)
CALCIUM SERPL-MCNC: 8.7 MG/DL (ref 8.5–10.1)
CHLORIDE SERPL-SCNC: 101 MMOL/L (ref 97–108)
CO2 SERPL-SCNC: 30 MMOL/L (ref 21–32)
COMMENT, HOLDF: NORMAL
CREAT SERPL-MCNC: 0.92 MG/DL (ref 0.7–1.3)
DIFFERENTIAL METHOD BLD: ABNORMAL
EOSINOPHIL # BLD: 0.1 K/UL (ref 0–0.4)
EOSINOPHIL NFR BLD: 1 % (ref 0–7)
ERYTHROCYTE [DISTWIDTH] IN BLOOD BY AUTOMATED COUNT: 12.1 % (ref 11.5–14.5)
GLOBULIN SER CALC-MCNC: 5.1 G/DL (ref 2–4)
GLUCOSE SERPL-MCNC: 148 MG/DL (ref 65–100)
HCT VFR BLD AUTO: 38.3 % (ref 36.6–50.3)
HGB BLD-MCNC: 13 G/DL (ref 12.1–17)
IMM GRANULOCYTES # BLD AUTO: 0 K/UL (ref 0–0.04)
IMM GRANULOCYTES NFR BLD AUTO: 0 % (ref 0–0.5)
LACTATE SERPL-SCNC: 1.2 MMOL/L (ref 0.4–2)
LYMPHOCYTES # BLD: 0.8 K/UL (ref 0.8–3.5)
LYMPHOCYTES NFR BLD: 7 % (ref 12–49)
MCH RBC QN AUTO: 31.7 PG (ref 26–34)
MCHC RBC AUTO-ENTMCNC: 33.9 G/DL (ref 30–36.5)
MCV RBC AUTO: 93.4 FL (ref 80–99)
MONOCYTES # BLD: 1 K/UL (ref 0–1)
MONOCYTES NFR BLD: 9 % (ref 5–13)
NEUTS SEG # BLD: 9.6 K/UL (ref 1.8–8)
NEUTS SEG NFR BLD: 83 % (ref 32–75)
NRBC # BLD: 0 K/UL (ref 0–0.01)
NRBC BLD-RTO: 0 PER 100 WBC
PLATELET # BLD AUTO: 294 K/UL (ref 150–400)
PMV BLD AUTO: 9.5 FL (ref 8.9–12.9)
POTASSIUM SERPL-SCNC: 3.6 MMOL/L (ref 3.5–5.1)
PROT SERPL-MCNC: 8.4 G/DL (ref 6.4–8.2)
RBC # BLD AUTO: 4.1 M/UL (ref 4.1–5.7)
SAMPLES BEING HELD,HOLD: NORMAL
SODIUM SERPL-SCNC: 135 MMOL/L (ref 136–145)
WBC # BLD AUTO: 11.6 K/UL (ref 4.1–11.1)

## 2022-12-08 PROCEDURE — 74011000636 HC RX REV CODE- 636

## 2022-12-08 PROCEDURE — 74011250636 HC RX REV CODE- 250/636: Performed by: EMERGENCY MEDICINE

## 2022-12-08 PROCEDURE — 74177 CT ABD & PELVIS W/CONTRAST: CPT

## 2022-12-08 PROCEDURE — 65270000032 HC RM SEMIPRIVATE

## 2022-12-08 PROCEDURE — 36415 COLL VENOUS BLD VENIPUNCTURE: CPT

## 2022-12-08 PROCEDURE — 80053 COMPREHEN METABOLIC PANEL: CPT

## 2022-12-08 PROCEDURE — 83605 ASSAY OF LACTIC ACID: CPT

## 2022-12-08 PROCEDURE — 85025 COMPLETE CBC W/AUTO DIFF WBC: CPT

## 2022-12-08 PROCEDURE — 87040 BLOOD CULTURE FOR BACTERIA: CPT

## 2022-12-08 PROCEDURE — 74011250637 HC RX REV CODE- 250/637: Performed by: EMERGENCY MEDICINE

## 2022-12-08 PROCEDURE — 99285 EMERGENCY DEPT VISIT HI MDM: CPT

## 2022-12-08 PROCEDURE — 74011000258 HC RX REV CODE- 258: Performed by: EMERGENCY MEDICINE

## 2022-12-08 RX ORDER — SODIUM CHLORIDE 0.9 % (FLUSH) 0.9 %
5-40 SYRINGE (ML) INJECTION AS NEEDED
Status: DISCONTINUED | OUTPATIENT
Start: 2022-12-08 | End: 2022-12-23 | Stop reason: HOSPADM

## 2022-12-08 RX ORDER — ACETAMINOPHEN 500 MG
1000 TABLET ORAL
Status: COMPLETED | OUTPATIENT
Start: 2022-12-08 | End: 2022-12-08

## 2022-12-08 RX ORDER — HYDROMORPHONE HYDROCHLORIDE 1 MG/ML
0.5 INJECTION, SOLUTION INTRAMUSCULAR; INTRAVENOUS; SUBCUTANEOUS
Status: COMPLETED | OUTPATIENT
Start: 2022-12-08 | End: 2022-12-08

## 2022-12-08 RX ORDER — POLYETHYLENE GLYCOL 3350 17 G/17G
17 POWDER, FOR SOLUTION ORAL DAILY PRN
Status: DISCONTINUED | OUTPATIENT
Start: 2022-12-08 | End: 2022-12-21

## 2022-12-08 RX ORDER — ACETAMINOPHEN 325 MG/1
650 TABLET ORAL
Status: DISCONTINUED | OUTPATIENT
Start: 2022-12-08 | End: 2022-12-15 | Stop reason: SDUPTHER

## 2022-12-08 RX ORDER — MORPHINE SULFATE 2 MG/ML
2 INJECTION, SOLUTION INTRAMUSCULAR; INTRAVENOUS
Status: DISCONTINUED | OUTPATIENT
Start: 2022-12-08 | End: 2022-12-23 | Stop reason: HOSPADM

## 2022-12-08 RX ORDER — ONDANSETRON 4 MG/1
4 TABLET, ORALLY DISINTEGRATING ORAL
Status: DISCONTINUED | OUTPATIENT
Start: 2022-12-08 | End: 2022-12-21

## 2022-12-08 RX ORDER — ACETAMINOPHEN 650 MG/1
650 SUPPOSITORY RECTAL
Status: DISCONTINUED | OUTPATIENT
Start: 2022-12-08 | End: 2022-12-21

## 2022-12-08 RX ORDER — ONDANSETRON 2 MG/ML
4 INJECTION INTRAMUSCULAR; INTRAVENOUS
Status: DISCONTINUED | OUTPATIENT
Start: 2022-12-08 | End: 2022-12-21

## 2022-12-08 RX ORDER — ACETAMINOPHEN 325 MG/1
650 TABLET ORAL
Status: DISCONTINUED | OUTPATIENT
Start: 2022-12-08 | End: 2022-12-21

## 2022-12-08 RX ORDER — DEXTROSE, SODIUM CHLORIDE, AND POTASSIUM CHLORIDE 5; .45; .15 G/100ML; G/100ML; G/100ML
75 INJECTION INTRAVENOUS CONTINUOUS
Status: DISCONTINUED | OUTPATIENT
Start: 2022-12-09 | End: 2022-12-16

## 2022-12-08 RX ORDER — ENOXAPARIN SODIUM 100 MG/ML
40 INJECTION SUBCUTANEOUS DAILY
Status: DISCONTINUED | OUTPATIENT
Start: 2022-12-09 | End: 2022-12-21 | Stop reason: CLARIF

## 2022-12-08 RX ORDER — SODIUM CHLORIDE 0.9 % (FLUSH) 0.9 %
5-40 SYRINGE (ML) INJECTION EVERY 8 HOURS
Status: DISCONTINUED | OUTPATIENT
Start: 2022-12-09 | End: 2022-12-21

## 2022-12-08 RX ADMIN — SODIUM CHLORIDE 1000 ML: 9 INJECTION, SOLUTION INTRAVENOUS at 21:31

## 2022-12-08 RX ADMIN — ACETAMINOPHEN 1000 MG: 500 TABLET ORAL at 20:29

## 2022-12-08 RX ADMIN — IOPAMIDOL 100 ML: 755 INJECTION, SOLUTION INTRAVENOUS at 18:29

## 2022-12-08 RX ADMIN — HYDROMORPHONE HYDROCHLORIDE 0.5 MG: 1 INJECTION, SOLUTION INTRAMUSCULAR; INTRAVENOUS; SUBCUTANEOUS at 21:30

## 2022-12-08 RX ADMIN — PIPERACILLIN AND TAZOBACTAM 4.5 G: 4; .5 INJECTION, POWDER, FOR SOLUTION INTRAVENOUS at 21:30

## 2022-12-08 NOTE — ED PROVIDER NOTES
Karri Lieberman is a 64 yo M with h/o psoriasis and elevated cholesterol with h/I diverticulitis with abscess last month. He had been admitted on 11/23 following failed course of Flagyl and cipro. He was then prescribed Flagyl and Levaquin and after being seen by colorectal surgery Dr. Morteza Flores, in the office on Monday was prescribed Augmentin. She had also ordered a CT abd/pelvis but it is not scheduled until the 20th. He continues to have increasing LLQ abdominal pain and fevers. He called Dr. Danielle Valdes office and was advised to come to the ED. Past Medical History:   Diagnosis Date    Hypercholesterolemia     Psoriasis        Past Surgical History:   Procedure Laterality Date    HX OTHER SURGICAL      Laparoscopic inguinal hernia repair         No family history on file. Social History     Socioeconomic History    Marital status:      Spouse name: Not on file    Number of children: Not on file    Years of education: Not on file    Highest education level: Not on file   Occupational History    Not on file   Tobacco Use    Smoking status: Not on file    Smokeless tobacco: Not on file   Substance and Sexual Activity    Alcohol use: Not on file    Drug use: Not on file    Sexual activity: Not on file   Other Topics Concern    Not on file   Social History Narrative    Not on file     Social Determinants of Health     Financial Resource Strain: Not on file   Food Insecurity: Not on file   Transportation Needs: Not on file   Physical Activity: Not on file   Stress: Not on file   Social Connections: Not on file   Intimate Partner Violence: Not on file   Housing Stability: Not on file         ALLERGIES: Patient has no known allergies. Review of Systems   Constitutional:  Positive for fever. HENT:  Negative for sore throat. Eyes:  Negative for visual disturbance. Respiratory:  Negative for cough. Cardiovascular:  Negative for chest pain. Gastrointestinal:  Positive for abdominal pain. Genitourinary:  Negative for dysuria. Musculoskeletal:  Negative for back pain. Skin:  Negative for rash. Neurological:  Negative for headaches. Vitals:    12/08/22 1405 12/08/22 1717   BP: (!) 169/97 (!) 140/102   Pulse: (!) 106 (!) 112   Resp: 18 18   Temp: 100.1 °F (37.8 °C) (!) 102.2 °F (39 °C)   SpO2: 97% 96%            Physical Exam  Vitals and nursing note reviewed. Constitutional:       General: He is not in acute distress. Appearance: He is well-developed. HENT:      Head: Normocephalic and atraumatic. Mouth/Throat:      Mouth: Mucous membranes are moist.   Eyes:      Extraocular Movements: Extraocular movements intact. Conjunctiva/sclera: Conjunctivae normal.   Neck:      Trachea: Phonation normal.   Cardiovascular:      Rate and Rhythm: Normal rate. Pulmonary:      Effort: Pulmonary effort is normal. No respiratory distress. Abdominal:      General: There is no distension. Tenderness: There is abdominal tenderness in the left lower quadrant. Musculoskeletal:         General: No tenderness. Normal range of motion. Cervical back: Normal range of motion. Skin:     General: Skin is warm and dry. Neurological:      General: No focal deficit present. Mental Status: He is alert. He is not disoriented. Motor: No abnormal muscle tone. MDM           7:52 PM  Discussed with Dr. Enrique Pnatoja, on call for Dr. Nancy Crane. CT reviewed with increased moderate distal descending colon diverticulitis with non acute abscess. PAtient febrile to 102. 2.  acetaminophen, hydromorphone and IVNS ordered in ED but patient has not received because he remains in waiting room. Dr. Enrique Pantoja will place admit orders now in hopes that patient will get a med/surge bed tonight.       Procedures

## 2022-12-08 NOTE — ED TRIAGE NOTES
Pt arrives for abd pain and cramping and low grade fevers. States he had a recent colonoscopy that shows abscess in colon. Plan for GI to meet him here to consult. Fever tmax 102 at home. Pain 6/10. Currently on Augmentin daily.

## 2022-12-09 ENCOUNTER — APPOINTMENT (OUTPATIENT)
Dept: GENERAL RADIOLOGY | Age: 57
DRG: 330 | End: 2022-12-09
Attending: NURSE PRACTITIONER
Payer: COMMERCIAL

## 2022-12-09 LAB
ANION GAP SERPL CALC-SCNC: 6 MMOL/L (ref 5–15)
BUN SERPL-MCNC: 6 MG/DL (ref 6–20)
BUN/CREAT SERPL: 7 (ref 12–20)
CALCIUM SERPL-MCNC: 8 MG/DL (ref 8.5–10.1)
CHLORIDE SERPL-SCNC: 107 MMOL/L (ref 97–108)
CO2 SERPL-SCNC: 25 MMOL/L (ref 21–32)
CREAT SERPL-MCNC: 0.82 MG/DL (ref 0.7–1.3)
ERYTHROCYTE [DISTWIDTH] IN BLOOD BY AUTOMATED COUNT: 11.9 % (ref 11.5–14.5)
FLUAV RNA SPEC QL NAA+PROBE: NOT DETECTED
FLUBV RNA SPEC QL NAA+PROBE: NOT DETECTED
GLUCOSE SERPL-MCNC: 105 MG/DL (ref 65–100)
HCT VFR BLD AUTO: 36.9 % (ref 36.6–50.3)
HGB BLD-MCNC: 12.3 G/DL (ref 12.1–17)
MCH RBC QN AUTO: 31.5 PG (ref 26–34)
MCHC RBC AUTO-ENTMCNC: 33.3 G/DL (ref 30–36.5)
MCV RBC AUTO: 94.4 FL (ref 80–99)
NRBC # BLD: 0 K/UL (ref 0–0.01)
NRBC BLD-RTO: 0 PER 100 WBC
PLATELET # BLD AUTO: 240 K/UL (ref 150–400)
PMV BLD AUTO: 9.5 FL (ref 8.9–12.9)
POTASSIUM SERPL-SCNC: 4.1 MMOL/L (ref 3.5–5.1)
RBC # BLD AUTO: 3.91 M/UL (ref 4.1–5.7)
SARS-COV-2, COV2: NOT DETECTED
SODIUM SERPL-SCNC: 138 MMOL/L (ref 136–145)
WBC # BLD AUTO: 9.7 K/UL (ref 4.1–11.1)

## 2022-12-09 PROCEDURE — 80048 BASIC METABOLIC PNL TOTAL CA: CPT

## 2022-12-09 PROCEDURE — 74011250636 HC RX REV CODE- 250/636: Performed by: COLON & RECTAL SURGERY

## 2022-12-09 PROCEDURE — 87636 SARSCOV2 & INF A&B AMP PRB: CPT

## 2022-12-09 PROCEDURE — 74011000250 HC RX REV CODE- 250: Performed by: COLON & RECTAL SURGERY

## 2022-12-09 PROCEDURE — 65270000032 HC RM SEMIPRIVATE

## 2022-12-09 PROCEDURE — 93005 ELECTROCARDIOGRAM TRACING: CPT

## 2022-12-09 PROCEDURE — 74011000258 HC RX REV CODE- 258: Performed by: COLON & RECTAL SURGERY

## 2022-12-09 PROCEDURE — 85027 COMPLETE CBC AUTOMATED: CPT

## 2022-12-09 PROCEDURE — 74011250637 HC RX REV CODE- 250/637: Performed by: COLON & RECTAL SURGERY

## 2022-12-09 PROCEDURE — 36415 COLL VENOUS BLD VENIPUNCTURE: CPT

## 2022-12-09 RX ORDER — IBUPROFEN 200 MG
1 TABLET ORAL DAILY
Status: DISCONTINUED | OUTPATIENT
Start: 2022-12-10 | End: 2022-12-23 | Stop reason: HOSPADM

## 2022-12-09 RX ADMIN — SODIUM CHLORIDE, PRESERVATIVE FREE 10 ML: 5 INJECTION INTRAVENOUS at 01:52

## 2022-12-09 RX ADMIN — ACETAMINOPHEN 650 MG: 325 TABLET ORAL at 23:06

## 2022-12-09 RX ADMIN — MORPHINE SULFATE 2 MG: 2 INJECTION, SOLUTION INTRAMUSCULAR; INTRAVENOUS at 01:52

## 2022-12-09 RX ADMIN — ACETAMINOPHEN 650 MG: 325 TABLET ORAL at 09:57

## 2022-12-09 RX ADMIN — MORPHINE SULFATE 2 MG: 2 INJECTION, SOLUTION INTRAMUSCULAR; INTRAVENOUS at 14:46

## 2022-12-09 RX ADMIN — POTASSIUM CHLORIDE, DEXTROSE MONOHYDRATE AND SODIUM CHLORIDE 75 ML/HR: 150; 5; 450 INJECTION, SOLUTION INTRAVENOUS at 01:52

## 2022-12-09 RX ADMIN — PIPERACILLIN AND TAZOBACTAM 3.38 G: 3; .375 INJECTION, POWDER, LYOPHILIZED, FOR SOLUTION INTRAVENOUS at 12:07

## 2022-12-09 RX ADMIN — SODIUM CHLORIDE, PRESERVATIVE FREE 10 ML: 5 INJECTION INTRAVENOUS at 23:09

## 2022-12-09 RX ADMIN — PIPERACILLIN AND TAZOBACTAM 3.38 G: 3; .375 INJECTION, POWDER, LYOPHILIZED, FOR SOLUTION INTRAVENOUS at 03:50

## 2022-12-09 RX ADMIN — ENOXAPARIN SODIUM 40 MG: 100 INJECTION SUBCUTANEOUS at 09:53

## 2022-12-09 RX ADMIN — MORPHINE SULFATE 2 MG: 2 INJECTION, SOLUTION INTRAMUSCULAR; INTRAVENOUS at 09:57

## 2022-12-09 RX ADMIN — PIPERACILLIN AND TAZOBACTAM 3.38 G: 3; .375 INJECTION, POWDER, LYOPHILIZED, FOR SOLUTION INTRAVENOUS at 18:22

## 2022-12-09 RX ADMIN — ACETAMINOPHEN 650 MG: 325 TABLET ORAL at 16:51

## 2022-12-09 NOTE — CONSULTS
Infectious Disease Consult    Today's Date: 12/9/2022   Admit Date: 12/8/2022    Impression:   Diverticulitis  Fever  - t-max 102.2, wbc 11.6->9.7    Blood cx (12/8) no growth so far    CT of abd/pel (12/8) Increased moderate distal descending colon diverticulitis. Thick walled  nonacute abscess is not amenable to percutaneous drainage. Inflammation/infection involves the combined interfascial plane and adjacent musculature. Patient is at risk for developing sinus tract. Tobacco abuse  - nicotine patchy ordered  Plan:    Continue with IV zosyn   Adjust abx prn   CRS following; no drainable abscess, ? TPN , ? Surgery next week   Fever work up if temp >= 100.4 every 24 hours   Recommend PICC line placement if decided to start on TPN   Fever; currently on appropriate IV abx therapy, normal wbc. Pls check for influenza/Covid 19 and CXR   Recommend influenza vaccine prior to discharge; pt agreed    Above plan of care discussed and agreed with Dr. Gerald Ruff will see prn this weekend, call if issues arise. Anti-infectives:   IV zosyn 12/8-    Subjective:   Date of Consultation:  December 9, 2022  Referring Physician: Dr. Heaven Leija    Patient is a 65 y.o. male with medical hx of hyperlipidemia, psoriasis who had routine colonoscopy on 11/8, started to having LLQ pain. Pt was seen at [facility] middle of November, completed 10 day course of Cipro/flagyl to treat diverticulitis. LLQ pain was under control while on abx but pain returned upon completion. Pt was hospitalized between 11/23 with abd pain, was diagnosed with diverticulitis. CT of abd/pel revealed abscess along the left iliac fossa. Pt was evaluated by IR, was told that abscess was too small to drain. Pt was discharged home on 11/24 with one week course of PO flagyl and levaquin. Pt returned to ER on 12/8 with recurrent abd pain and fever. CT of abd/pel on admission revealed Increased moderate distal descending colon diverticulitis.  Thick walled  nonacute abscess is not amenable to percutaneous drainage. Inflammation/infection involves the combined interfascial plane and adjacent musculature. Patient is at risk for developing sinus tract. Pt was started on IV zosyn. CRS team is following. S/p COVID 19 vaccine; received first two series of vaccine  Has not received influenza vaccine, but would like to receive the vaccine  Tobacco abuse  Occasional alcohol intake  No known antibiotic allergies    ID team was consulted for abx recommendation to treat diverticulitis. Patient Active Problem List   Diagnosis Code    Diverticulitis K57.92     Past Medical History:   Diagnosis Date    Hypercholesterolemia     Psoriasis       No family history on file. Social History     Tobacco Use    Smoking status: Not on file    Smokeless tobacco: Not on file   Substance Use Topics    Alcohol use: Not on file     Past Surgical History:   Procedure Laterality Date    HX OTHER SURGICAL      Laparoscopic inguinal hernia repair      Prior to Admission medications    Medication Sig Start Date End Date Taking? Authorizing Provider   levoFLOXacin (Levaquin) 750 mg tablet Take 1 Tablet by mouth daily. 11/24/22   MD ivon Holly  No Known Allergies     REVIEW OF SYSTEMS:     Total of 12 systems reviewed as follows:   I am not able to complete the review of systems because:    The patient is intubated and sedated    The patient has altered mental status due to his acute medical problems    The patient has baseline aphasia from prior stroke(s)    The patient has baseline dementia and is not reliable historian                 POSITIVE= underlined text  Negative = text not underlined  General:  fever, chills, sweats, generalized weakness, weight loss/gain,      loss of appetite   Eyes:    blurred vision, eye pain, loss of vision, double vision  ENT:    rhinorrhea, pharyngitis   Respiratory:   cough, sputum production, SOB, wheezing, FISH, pleuritic pain   Cardiology: chest pain, palpitations, orthopnea, PND, edema, syncope   Gastrointestinal:  abdominal pain , N/V, dysphagia, diarrhea, constipation, bleeding   Genitourinary:  frequency, urgency, dysuria, hematuria, incontinence   Muskuloskeletal :  arthralgia, myalgia   Hematology:  easy bruising, nose or gum bleeding, lymphadenopathy   Dermatological: rash, ulceration, pruritis   Endocrine:   hot flashes or polydipsia   Neurological:  headache, dizziness, confusion, focal weakness, paresthesia,     Speech difficulties, memory loss, gait disturbance  Psychological: Feelings of anxiety, depression, agitation    Objective:     Visit Vitals  /85   Pulse 95   Temp 99.7 °F (37.6 °C)   Resp 16   SpO2 92%     Temp (24hrs), Av.8 °F (37.7 °C), Min:98.5 °F (36.9 °C), Max:102.2 °F (39 °C)       Lines:  peripheral line    PHYSICAL EXAM:   General:    Alert, cooperative, no distress, appears stated age. HEENT: Atraumatic, anicteric sclerae, pink conjunctivae     No oral ulcers, mucosa moist, throat clear  Neck:  Supple  Lungs:   Clear to auscultation bilaterally. No Wheezing or Rhonchi. No rales. Chest wall:  No tenderness  No Accessory muscle use. Heart:   Regular  rhythm,  No  murmur   No edema  Abdomen:   Soft, LLQ tender. Not distended. Bowel sounds normal  Extremities: No cyanosis. No clubbing  Skin:     Not pale. Not Jaundiced  No rashes   Psych:  Good insight. Not depressed. Not anxious or agitated. Neurologic: EOMs intact. No facial asymmetry. No aphasia or slurred speech. Symmetrical strength, Alert and oriented X 4.        Data Review:     CBC:  Recent Labs     22  0418 22  1445   WBC 9.7 11.6*   GRANS  --  83*   MONOS  --  9   EOS  --  1   ANEU  --  9.6*   ABL  --  0.8   HGB 12.3 13.0   HCT 36.9 38.3    294       BMP:  Recent Labs     22  0418 22  1445   CREA 0.82 0.92   BUN 6 8    135*   K 4.1 3.6    101   CO2 25 30   AGAP 6 4*   * 148*       LFTS:  Recent Labs     12/08/22 1445   TBILI 0.6   ALT 19   AP 89   TP 8.4*   ALB 3.3*       Microbiology:     All Micro Results       Procedure Component Value Units Date/Time    CULTURE, BLOOD, PAIRED [[id number]] Collected: 12/08/22 1445    Order Status: Completed Specimen: Blood Updated: 12/09/22 0521     Special Requests: NO SPECIAL REQUESTS        Culture result: NO GROWTH AFTER 12 HOURS               Signed By: Court Becerra NP     December 9, 2022

## 2022-12-09 NOTE — PROGRESS NOTES
Patient with smoldering diverticulitis. Failed oral abx and is still having quite a bit of abdominal pain. CT shows persistent inflammation of proximal sigmoid diverticulitis. Will admit for iv abx and see if we can cool off this episode.

## 2022-12-09 NOTE — H&P
Colon and Rectal Surgery History and Physical    Subjective:      Liss Fitzgerald is a 65 y.o. male who has a history of diverticulitis. He has had a smoldering episode and has been on oral abx. He continues to have pain and fevers. CT yesterday showed no drainable abscess. Patient Active Problem List    Diagnosis Date Noted    Diverticulitis 11/23/2022     Past Medical History:   Diagnosis Date    Hypercholesterolemia     Psoriasis       Past Surgical History:   Procedure Laterality Date    HX OTHER SURGICAL      Laparoscopic inguinal hernia repair      Social History     Tobacco Use    Smoking status: Not on file    Smokeless tobacco: Not on file   Substance Use Topics    Alcohol use: Not on file      No family history on file. Prior to Admission medications    Medication Sig Start Date End Date Taking? Authorizing Provider   levoFLOXacin (Levaquin) 750 mg tablet Take 1 Tablet by mouth daily. 11/24/22   Lamar Vides MD     No Known Allergies     Review of Systems:    A comprehensive review of systems was negative except for that written in the History of Present Illness. Objective:     Visit Vitals  /85   Pulse 77   Temp 98.6 °F (37 °C)   Resp 16   SpO2 97%        Physical Exam:   General:  Alert, cooperative, no distress, appears stated age. Head:  Normocephalic, without obvious abnormality, atraumatic. Eyes:  Conjunctivae/corneas clear. PERRL, EOMs intact. Ears:  Normal external ear canals both ears. Nose: Nares normal. Septum midline. No drainage. Throat: Lips, mucosa, and tongue normal. Teeth and gums normal.   Neck: Supple, symmetrical, trachea midline, no adenopathy   Back:   Symmetric, no curvature. ROM normal.   Lungs:   Clear   Chest wall:  No tenderness or deformity. Heart:  Regular rate and rhythm       Abdomen:   Soft. Tender locally in the LLQ, but not peritoneal.    Genitalia:  deferred       Extremities: Extremities normal, atraumatic, no cyanosis or edema.        Skin: Skin color, texture, turgor normal. No rashes or lesions. Neurologic: CNII-XII intact. Normal strength, sensation and reflexes throughout. Imaging:  images and reports reviewed    Lab Review:    Recent Results (from the past 24 hour(s))   CBC WITH AUTOMATED DIFF    Collection Time: 12/08/22  2:45 PM   Result Value Ref Range    WBC 11.6 (H) 4.1 - 11.1 K/uL    RBC 4.10 4. 10 - 5.70 M/uL    HGB 13.0 12.1 - 17.0 g/dL    HCT 38.3 36.6 - 50.3 %    MCV 93.4 80.0 - 99.0 FL    MCH 31.7 26.0 - 34.0 PG    MCHC 33.9 30.0 - 36.5 g/dL    RDW 12.1 11.5 - 14.5 %    PLATELET 234 481 - 034 K/uL    MPV 9.5 8.9 - 12.9 FL    NRBC 0.0 0  WBC    ABSOLUTE NRBC 0.00 0.00 - 0.01 K/uL    NEUTROPHILS 83 (H) 32 - 75 %    LYMPHOCYTES 7 (L) 12 - 49 %    MONOCYTES 9 5 - 13 %    EOSINOPHILS 1 0 - 7 %    BASOPHILS 0 0 - 1 %    IMMATURE GRANULOCYTES 0 0.0 - 0.5 %    ABS. NEUTROPHILS 9.6 (H) 1.8 - 8.0 K/UL    ABS. LYMPHOCYTES 0.8 0.8 - 3.5 K/UL    ABS. MONOCYTES 1.0 0.0 - 1.0 K/UL    ABS. EOSINOPHILS 0.1 0.0 - 0.4 K/UL    ABS. BASOPHILS 0.0 0.0 - 0.1 K/UL    ABS. IMM. GRANS. 0.0 0.00 - 0.04 K/UL    DF AUTOMATED     METABOLIC PANEL, COMPREHENSIVE    Collection Time: 12/08/22  2:45 PM   Result Value Ref Range    Sodium 135 (L) 136 - 145 mmol/L    Potassium 3.6 3.5 - 5.1 mmol/L    Chloride 101 97 - 108 mmol/L    CO2 30 21 - 32 mmol/L    Anion gap 4 (L) 5 - 15 mmol/L    Glucose 148 (H) 65 - 100 mg/dL    BUN 8 6 - 20 MG/DL    Creatinine 0.92 0.70 - 1.30 MG/DL    BUN/Creatinine ratio 9 (L) 12 - 20      eGFR >60 >60 ml/min/1.73m2    Calcium 8.7 8.5 - 10.1 MG/DL    Bilirubin, total 0.6 0.2 - 1.0 MG/DL    ALT (SGPT) 19 12 - 78 U/L    AST (SGOT) 12 (L) 15 - 37 U/L    Alk.  phosphatase 89 45 - 117 U/L    Protein, total 8.4 (H) 6.4 - 8.2 g/dL    Albumin 3.3 (L) 3.5 - 5.0 g/dL    Globulin 5.1 (H) 2.0 - 4.0 g/dL    A-G Ratio 0.6 (L) 1.1 - 2.2     CULTURE, BLOOD, PAIRED    Collection Time: 12/08/22  2:45 PM    Specimen: Blood   Result Value Ref Range Special Requests: NO SPECIAL REQUESTS      Culture result: NO GROWTH AFTER 12 HOURS     LACTIC ACID    Collection Time: 12/08/22  2:45 PM   Result Value Ref Range    Lactic acid 1.2 0.4 - 2.0 MMOL/L   SAMPLES BEING HELD    Collection Time: 12/08/22  2:45 PM   Result Value Ref Range    SAMPLES BEING HELD 1RED, 1BLU     COMMENT        Add-on orders for these samples will be processed based on acceptable specimen integrity and analyte stability, which may vary by analyte. METABOLIC PANEL, BASIC    Collection Time: 12/09/22  4:18 AM   Result Value Ref Range    Sodium 138 136 - 145 mmol/L    Potassium 4.1 3.5 - 5.1 mmol/L    Chloride 107 97 - 108 mmol/L    CO2 25 21 - 32 mmol/L    Anion gap 6 5 - 15 mmol/L    Glucose 105 (H) 65 - 100 mg/dL    BUN 6 6 - 20 MG/DL    Creatinine 0.82 0.70 - 1.30 MG/DL    BUN/Creatinine ratio 7 (L) 12 - 20      eGFR >60 >60 ml/min/1.73m2    Calcium 8.0 (L) 8.5 - 10.1 MG/DL   CBC W/O DIFF    Collection Time: 12/09/22  4:18 AM   Result Value Ref Range    WBC 9.7 4.1 - 11.1 K/uL    RBC 3.91 (L) 4.10 - 5.70 M/uL    HGB 12.3 12.1 - 17.0 g/dL    HCT 36.9 36.6 - 50.3 %    MCV 94.4 80.0 - 99.0 FL    MCH 31.5 26.0 - 34.0 PG    MCHC 33.3 30.0 - 36.5 g/dL    RDW 11.9 11.5 - 14.5 %    PLATELET 509 543 - 799 K/uL    MPV 9.5 8.9 - 12.9 FL    NRBC 0.0 0  WBC    ABSOLUTE NRBC 0.00 0.00 - 0.01 K/uL       Labs and radiology: images and reports reviewed      Assessment:   Diverticulitis refractory to oral abx. Plan:     IV abx  ID consult  NPO. OK for ice chips. Partners to decide on diet over the weekend. If no improvement, he may need surgery next week.    Consider TPN to start next week if he goes to surgery    Signed By: Bridgette Moore MD     December 9, 2022

## 2022-12-09 NOTE — ROUTINE PROCESS
Has a final transfer review been performed? Yes    Reason for Admission: Diverticulitis  Patient comes from: ED 23  Mental Status: Alert and oriented  ADL:self care  Ambulation:no difficulty  Pertinent Info/Safety Concerns: referral from GI s/p colonoscopy that showed an abscess in colon. A/O x 4, independent, SR, RA  COVID Status: Not tested  MEWS Score: 1     Vitals:    12/08/22 1405 12/08/22 1717 12/08/22 2052 12/08/22 2200   BP: (!) 169/97 (!) 140/102 (!) 144/74 113/68   Pulse: (!) 106 (!) 112 (!) 124 (!) 105   Resp: 18 18 16 12   Temp: 100.1 °F (37.8 °C) (!) 102.2 °F (39 °C) 100.3 °F (37.9 °C) 99.4 °F (37.4 °C)   SpO2: 97% 96% 96% 93%     Lines:   Peripheral IV 12/08/22 Left Wrist (Active)      Transport mode:ED stretcher    Harinder Estes  being transferred to [address] [place] [address] ([facility]) for routine progression of care     \"Notification of etransfer note given to Cyn Sparks.

## 2022-12-09 NOTE — ED NOTES
Pt noted to be eating a bag of [employer] food in the waiting room. . Pt ws instructed not to eat anything else @ this time.

## 2022-12-10 PROCEDURE — 74011250637 HC RX REV CODE- 250/637: Performed by: NURSE PRACTITIONER

## 2022-12-10 PROCEDURE — 74011250637 HC RX REV CODE- 250/637: Performed by: COLON & RECTAL SURGERY

## 2022-12-10 PROCEDURE — 74011000258 HC RX REV CODE- 258: Performed by: COLON & RECTAL SURGERY

## 2022-12-10 PROCEDURE — 65270000032 HC RM SEMIPRIVATE

## 2022-12-10 PROCEDURE — 74011000250 HC RX REV CODE- 250: Performed by: COLON & RECTAL SURGERY

## 2022-12-10 PROCEDURE — 74011250636 HC RX REV CODE- 250/636: Performed by: COLON & RECTAL SURGERY

## 2022-12-10 RX ADMIN — PIPERACILLIN AND TAZOBACTAM 3.38 G: 3; .375 INJECTION, POWDER, LYOPHILIZED, FOR SOLUTION INTRAVENOUS at 21:23

## 2022-12-10 RX ADMIN — PIPERACILLIN AND TAZOBACTAM 3.38 G: 3; .375 INJECTION, POWDER, LYOPHILIZED, FOR SOLUTION INTRAVENOUS at 02:29

## 2022-12-10 RX ADMIN — ENOXAPARIN SODIUM 40 MG: 100 INJECTION SUBCUTANEOUS at 09:51

## 2022-12-10 RX ADMIN — SODIUM CHLORIDE, PRESERVATIVE FREE 10 ML: 5 INJECTION INTRAVENOUS at 04:58

## 2022-12-10 RX ADMIN — MORPHINE SULFATE 2 MG: 2 INJECTION, SOLUTION INTRAMUSCULAR; INTRAVENOUS at 04:57

## 2022-12-10 RX ADMIN — PIPERACILLIN AND TAZOBACTAM 3.38 G: 3; .375 INJECTION, POWDER, LYOPHILIZED, FOR SOLUTION INTRAVENOUS at 10:55

## 2022-12-10 RX ADMIN — ACETAMINOPHEN 650 MG: 325 TABLET ORAL at 17:07

## 2022-12-10 RX ADMIN — MORPHINE SULFATE 2 MG: 2 INJECTION, SOLUTION INTRAMUSCULAR; INTRAVENOUS at 09:55

## 2022-12-10 RX ADMIN — POTASSIUM CHLORIDE, DEXTROSE MONOHYDRATE AND SODIUM CHLORIDE 75 ML/HR: 150; 5; 450 INJECTION, SOLUTION INTRAVENOUS at 17:18

## 2022-12-10 RX ADMIN — ACETAMINOPHEN 650 MG: 325 TABLET ORAL at 09:54

## 2022-12-10 RX ADMIN — MORPHINE SULFATE 2 MG: 2 INJECTION, SOLUTION INTRAMUSCULAR; INTRAVENOUS at 17:18

## 2022-12-10 RX ADMIN — SODIUM CHLORIDE, PRESERVATIVE FREE 10 ML: 5 INJECTION INTRAVENOUS at 21:16

## 2022-12-10 RX ADMIN — POTASSIUM CHLORIDE, DEXTROSE MONOHYDRATE AND SODIUM CHLORIDE 75 ML/HR: 150; 5; 450 INJECTION, SOLUTION INTRAVENOUS at 02:31

## 2022-12-10 NOTE — PROGRESS NOTES
CRS Note    Pt with no issues overnight, low level abd pain. No fevers.       PE  Abd soft, ND, minimally ttp    PLAN  - clear liquid diet  - IV abx

## 2022-12-10 NOTE — PROGRESS NOTES
Bedside and Verbal shift change report given to [facility] (oncoming nurse) by Azam Willoughby (offgoing nurse). Report included the following information SBAR, Kardex, ED Summary, MAR, and Recent Results.

## 2022-12-10 NOTE — PROGRESS NOTES
TRANSITION OF CARE  RUR--8%  Disposition--Home with family assist versus TBD. Consult: ID  Transport--Family    Patient's primary family contact: spouse Sita Hernandez    Note: per physician note, if no improvement, plan is to evaluate for surgery next week. If surgery is done, may need TPN. Readmission Assessment  Number of days since last admission?: 8-30 days  Previous disposition: Home with Family  Who is being interviewed?: Patient  What was the patient's/caregiver's perception as to why they think they needed to return back to the hospital?: Other (Comment) (refactory to oral abx)  Did you visit your Primary Care Physician after you left the hospital, before you returned this time?: Yes  Why weren't you able to visit your PCP?: Did not have an appointment  Did you see a specialist, such as Cardiac, Pulmonary, Orthopedic Physician, etc. after you left the hospital?: Yes  Who advised the patient to return to the hospital?: Physician  Does the patient report anything that got in the way of taking their medications?: No  In our efforts to provide the best possible care to you and others like you, can you think of anything that we could have done to help you after you left the hospital the first time, so that you might not have needed to return so soon?: Other (Comment) (Note)      Care Management Interventions  PCP Verified by CM: Yes  Transition of Care Consult (CM Consult): Other  Physical Therapy Consult: No  Occupational Therapy Consult: No  Speech Therapy Consult: No  Confirm Follow Up Transport: Family  The Plan for Transition of Care is Related to the Following Treatment Goals : Home with family assistance. Discharge Location  Patient Expects to be Discharged to[de-identified] Home with family assistance    Reason for Admission:  Diverticulitis to oral abx.                    RUR Score:            8%           Plan for utilizing home health:      TBD    PCP: First and Last name:  Sultana Azar DO   Name of Practice: Are you a current patient: Yes   Approximate date of last visit: about 2 weeks ago. Can you participate in a virtual visit with your PCP:                     Current Advanced Directive/Advance Care Plan: Full Code    Healthcare Decision Maker:   Click here to complete [address] including selection of the Healthcare Decision Maker Relationship (ie \"Primary\")                         Transition of Care Plan:        CM met with patient. Patient lives with his wife. Patient lives in a  one story house. Local family support includes sister in [place]. Patient was ambulatory prior to admission. Patient confirmed PCP, health insurance, and prescription coverage.    Previous home health : None  Previous IPR/ SNF rehab :None  DME : None

## 2022-12-11 PROCEDURE — 65270000029 HC RM PRIVATE

## 2022-12-11 PROCEDURE — 74011000258 HC RX REV CODE- 258: Performed by: COLON & RECTAL SURGERY

## 2022-12-11 PROCEDURE — 74011250637 HC RX REV CODE- 250/637: Performed by: COLON & RECTAL SURGERY

## 2022-12-11 PROCEDURE — 74011250637 HC RX REV CODE- 250/637: Performed by: NURSE PRACTITIONER

## 2022-12-11 PROCEDURE — 74011000250 HC RX REV CODE- 250: Performed by: COLON & RECTAL SURGERY

## 2022-12-11 PROCEDURE — 74011250636 HC RX REV CODE- 250/636: Performed by: COLON & RECTAL SURGERY

## 2022-12-11 RX ADMIN — PIPERACILLIN AND TAZOBACTAM 3.38 G: 3; .375 INJECTION, POWDER, LYOPHILIZED, FOR SOLUTION INTRAVENOUS at 10:00

## 2022-12-11 RX ADMIN — PIPERACILLIN AND TAZOBACTAM 3.38 G: 3; .375 INJECTION, POWDER, LYOPHILIZED, FOR SOLUTION INTRAVENOUS at 03:40

## 2022-12-11 RX ADMIN — PIPERACILLIN AND TAZOBACTAM 3.38 G: 3; .375 INJECTION, POWDER, LYOPHILIZED, FOR SOLUTION INTRAVENOUS at 19:16

## 2022-12-11 RX ADMIN — ENOXAPARIN SODIUM 40 MG: 100 INJECTION SUBCUTANEOUS at 09:58

## 2022-12-11 RX ADMIN — ACETAMINOPHEN 650 MG: 325 TABLET ORAL at 18:15

## 2022-12-11 RX ADMIN — POLYETHYLENE GLYCOL 3350 17 G: 17 POWDER, FOR SOLUTION ORAL at 20:51

## 2022-12-11 RX ADMIN — SODIUM CHLORIDE, PRESERVATIVE FREE 10 ML: 5 INJECTION INTRAVENOUS at 20:52

## 2022-12-11 RX ADMIN — ACETAMINOPHEN 650 MG: 325 TABLET ORAL at 01:11

## 2022-12-11 NOTE — PROGRESS NOTES
CRS Note    Pain is improving, able to move around without discomfort. Still having drenching sweats at night although no registered fever.     PE  Abd soft, ND, minimally ttp    PLAN  - adv to full liquid diet  - IV abx

## 2022-12-12 LAB
ALBUMIN SERPL-MCNC: 2.5 G/DL (ref 3.5–5)
ALBUMIN/GLOB SERPL: 0.6 {RATIO} (ref 1.1–2.2)
ALP SERPL-CCNC: 105 U/L (ref 45–117)
ALT SERPL-CCNC: 17 U/L (ref 12–78)
ANION GAP SERPL CALC-SCNC: 4 MMOL/L (ref 5–15)
AST SERPL-CCNC: 14 U/L (ref 15–37)
ATRIAL RATE: 97 BPM
BASOPHILS # BLD: 0 K/UL (ref 0–0.1)
BASOPHILS NFR BLD: 0 % (ref 0–1)
BILIRUB SERPL-MCNC: 0.5 MG/DL (ref 0.2–1)
BUN SERPL-MCNC: 6 MG/DL (ref 6–20)
BUN/CREAT SERPL: 6 (ref 12–20)
CALCIUM SERPL-MCNC: 7.9 MG/DL (ref 8.5–10.1)
CALCULATED P AXIS, ECG09: 30 DEGREES
CALCULATED R AXIS, ECG10: -45 DEGREES
CALCULATED T AXIS, ECG11: 11 DEGREES
CHLORIDE SERPL-SCNC: 104 MMOL/L (ref 97–108)
CO2 SERPL-SCNC: 28 MMOL/L (ref 21–32)
CREAT SERPL-MCNC: 0.93 MG/DL (ref 0.7–1.3)
DIAGNOSIS, 93000: NORMAL
DIFFERENTIAL METHOD BLD: ABNORMAL
EOSINOPHIL # BLD: 0.1 K/UL (ref 0–0.4)
EOSINOPHIL NFR BLD: 2 % (ref 0–7)
ERYTHROCYTE [DISTWIDTH] IN BLOOD BY AUTOMATED COUNT: 11.9 % (ref 11.5–14.5)
GLOBULIN SER CALC-MCNC: 3.9 G/DL (ref 2–4)
GLUCOSE SERPL-MCNC: 125 MG/DL (ref 65–100)
HCT VFR BLD AUTO: 32.4 % (ref 36.6–50.3)
HGB BLD-MCNC: 10.7 G/DL (ref 12.1–17)
IMM GRANULOCYTES # BLD AUTO: 0 K/UL (ref 0–0.04)
IMM GRANULOCYTES NFR BLD AUTO: 0 % (ref 0–0.5)
LYMPHOCYTES # BLD: 0.9 K/UL (ref 0.8–3.5)
LYMPHOCYTES NFR BLD: 11 % (ref 12–49)
MCH RBC QN AUTO: 31.2 PG (ref 26–34)
MCHC RBC AUTO-ENTMCNC: 33 G/DL (ref 30–36.5)
MCV RBC AUTO: 94.5 FL (ref 80–99)
MONOCYTES # BLD: 0.9 K/UL (ref 0–1)
MONOCYTES NFR BLD: 11 % (ref 5–13)
NEUTS SEG # BLD: 6.7 K/UL (ref 1.8–8)
NEUTS SEG NFR BLD: 76 % (ref 32–75)
NRBC # BLD: 0 K/UL (ref 0–0.01)
NRBC BLD-RTO: 0 PER 100 WBC
P-R INTERVAL, ECG05: 146 MS
PLATELET # BLD AUTO: 248 K/UL (ref 150–400)
PMV BLD AUTO: 9.8 FL (ref 8.9–12.9)
POTASSIUM SERPL-SCNC: 4.2 MMOL/L (ref 3.5–5.1)
PROT SERPL-MCNC: 6.4 G/DL (ref 6.4–8.2)
Q-T INTERVAL, ECG07: 358 MS
QRS DURATION, ECG06: 98 MS
QTC CALCULATION (BEZET), ECG08: 454 MS
RBC # BLD AUTO: 3.43 M/UL (ref 4.1–5.7)
SODIUM SERPL-SCNC: 136 MMOL/L (ref 136–145)
VENTRICULAR RATE, ECG03: 97 BPM
WBC # BLD AUTO: 8.7 K/UL (ref 4.1–11.1)

## 2022-12-12 PROCEDURE — 74011000258 HC RX REV CODE- 258: Performed by: COLON & RECTAL SURGERY

## 2022-12-12 PROCEDURE — 80053 COMPREHEN METABOLIC PANEL: CPT

## 2022-12-12 PROCEDURE — 74011250637 HC RX REV CODE- 250/637: Performed by: COLON & RECTAL SURGERY

## 2022-12-12 PROCEDURE — 74011000250 HC RX REV CODE- 250: Performed by: COLON & RECTAL SURGERY

## 2022-12-12 PROCEDURE — 36415 COLL VENOUS BLD VENIPUNCTURE: CPT

## 2022-12-12 PROCEDURE — 65270000029 HC RM PRIVATE

## 2022-12-12 PROCEDURE — 74011250636 HC RX REV CODE- 250/636: Performed by: COLON & RECTAL SURGERY

## 2022-12-12 PROCEDURE — 85025 COMPLETE CBC W/AUTO DIFF WBC: CPT

## 2022-12-12 RX ADMIN — PIPERACILLIN AND TAZOBACTAM 3.38 G: 3; .375 INJECTION, POWDER, LYOPHILIZED, FOR SOLUTION INTRAVENOUS at 03:05

## 2022-12-12 RX ADMIN — PIPERACILLIN AND TAZOBACTAM 3.38 G: 3; .375 INJECTION, POWDER, LYOPHILIZED, FOR SOLUTION INTRAVENOUS at 11:49

## 2022-12-12 RX ADMIN — MORPHINE SULFATE 2 MG: 2 INJECTION, SOLUTION INTRAMUSCULAR; INTRAVENOUS at 03:13

## 2022-12-12 RX ADMIN — ENOXAPARIN SODIUM 40 MG: 100 INJECTION SUBCUTANEOUS at 08:23

## 2022-12-12 RX ADMIN — ACETAMINOPHEN 650 MG: 325 TABLET ORAL at 15:18

## 2022-12-12 RX ADMIN — MORPHINE SULFATE 2 MG: 2 INJECTION, SOLUTION INTRAMUSCULAR; INTRAVENOUS at 21:19

## 2022-12-12 RX ADMIN — POTASSIUM CHLORIDE, DEXTROSE MONOHYDRATE AND SODIUM CHLORIDE 75 ML/HR: 150; 5; 450 INJECTION, SOLUTION INTRAVENOUS at 08:19

## 2022-12-12 RX ADMIN — SODIUM CHLORIDE, PRESERVATIVE FREE 10 ML: 5 INJECTION INTRAVENOUS at 21:19

## 2022-12-12 RX ADMIN — PIPERACILLIN AND TAZOBACTAM 3.38 G: 3; .375 INJECTION, POWDER, LYOPHILIZED, FOR SOLUTION INTRAVENOUS at 19:58

## 2022-12-12 NOTE — PROGRESS NOTES
Improving fever curve but still low grade fevers.    Abd soft    A/p continue abx  Slowly advance diet  Appreciate ID input

## 2022-12-12 NOTE — PROGRESS NOTES
ID Progress Note  2022    Subjective:     Having some loose stools  Review of Systems:            Symptom Y/N Comments   Symptom Y/N Comments   Fever/Chills n      Chest Pain n       Poor Appetite       Edema        Cough       Abdominal Pain n       Sputum       Joint Pain        SOB/FISH  n     Pruritis/Rash        Nausea/vomit  n     Tolerating PT/OT        Diarrhea  y     Tolerating Diet        Constipation       Other           Could NOT obtain due to:       Objective:     Vitals: Visit Vitals  /70   Pulse 88   Temp 99 °F (37.2 °C)   Resp 16   SpO2 92%        Tmax:  Temp (24hrs), Av.8 °F (37.1 °C), Min:98 °F (36.7 °C), Max:99.6 °F (37.6 °C)      PHYSICAL EXAM:  General: WD, WN. Alert, cooperative, no acute distress    EENT:  EOMI. Anicteric sclerae. MMM  Resp:  CTA bilaterally, no wheezing or rales. No accessory muscle use  CV:  Regular  rhythm,  No edema  GI:  Soft, Non distended, Non tender. +Bowel sounds  Neurologic:  Alert and oriented X 3, normal speech,   Psych:   Good insight. Not anxious nor agitated  Skin:  No rashes. No jaundice    Labs:   Lab Results   Component Value Date/Time    WBC 9.7 2022 04:18 AM    HGB 12.3 2022 04:18 AM    HCT 36.9 2022 04:18 AM    PLATELET 215  04:18 AM    MCV 94.4 2022 04:18 AM     Lab Results   Component Value Date/Time    Sodium 138 2022 04:18 AM    Potassium 4.1 2022 04:18 AM    Chloride 107 2022 04:18 AM    CO2 25 2022 04:18 AM    Anion gap 6 2022 04:18 AM    Glucose 105 (H) 2022 04:18 AM    BUN 6 2022 04:18 AM    Creatinine 0.82 2022 04:18 AM    BUN/Creatinine ratio 7 (L) 2022 04:18 AM    Calcium 8.0 (L) 2022 04:18 AM    Bilirubin, total 0.6 2022 02:45 PM    Alk.  phosphatase 89 2022 02:45 PM    Protein, total 8.4 (H) 2022 02:45 PM    Albumin 3.3 (L) 2022 02:45 PM    Globulin 5.1 (H) 2022 02:45 PM    A-G Ratio 0.6 (L) 2022 02:45 PM    ALT (SGPT) 19 12/08/2022 02:45 PM         Cultures:   Results       Procedure Component Value Units Date/Time    COVID-19 WITH INFLUENZA A/B [[id number]] Collected: 12/09/22 1030    Order Status: Completed Specimen: Nasopharyngeal Updated: 12/09/22 1330     SARS-CoV-2 by PCR Not detected        Comment: Not Detected results do not preclude SARS-CoV-2 infection and should not be used as the sole basis for patient management decisions. Results must be combined with clinical observations, patient history, and epidemiological information. Influenza A by PCR Not detected        Influenza B by PCR Not detected        Comment: Testing was performed using arsenio Nora SARS-CoV-2 and Influenza A/B nucleic acid assay. This test is a multiplex Real-Time Reverse Transcriptase Polymerase Chain Reaction (RT-PCR) based in vitro diagnostic test intended for the qualitative detection of nucleic acids from SARS-CoV-2, Influenza A, and Influenza B in nasopharyngeal and nasal swab specimens for use under the FDA's Emergency Use Authorization (EUA) only. Fact sheet for Patients: FindDrives.pl  Fact sheet for Healthcare Providers: FindDrives.pl         CULTURE, BLOOD, PAIRED [[id number]] Collected: 12/08/22 1445    Order Status: Completed Specimen: Blood Updated: 12/12/22 0214     Special Requests: NO SPECIAL REQUESTS        Culture result: NO GROWTH 4 DAYS                 Impression:   Diverticulitis  Fever  - afebrile, wbc 11.6->9.7    Blood cx (12/8) no growth so far    CT of abd/pel (12/8) Increased moderate distal descending colon diverticulitis. Thick walled  nonacute abscess is not amenable to percutaneous drainage. Inflammation/infection involves the combined interfascial plane and adjacent musculature. Patient is at risk for developing sinus tract.      Tobacco abuse  - nicotine patchy ordered  Plan:    Continue with IV zosyn   Probiotic added to the therapy   Adjust abx prn   CRS following; no drainable abscess, ? TPN , ? Surgery next week   Fever work up if temp >= 100.4 every 24 hours   Recommend PICC line placement if decided to start on TPN   Recommend influenza vaccine prior to discharge; pt agreed    Above plan of care discussed and agreed with Dr. Lulú Urbina, NP

## 2022-12-12 NOTE — PROGRESS NOTES
RUR: 9% Low    MINDA: Anticipated discharge home. Patient's wife will provide transport home once medically stable. Follow-up with PCP/specialist.     Primary Contact: Wife, Chanelle Mayfield, [phone]    3:30PM - CM reviewed chart. Per review and ID rounds, patient remains on IV abx. Discharge pending medical progress and final recommendations. CM will continue to follow as needed.      MARIE Landers   [phone]

## 2022-12-13 LAB
ALBUMIN SERPL-MCNC: 2.4 G/DL (ref 3.5–5)
ALBUMIN/GLOB SERPL: 0.5 {RATIO} (ref 1.1–2.2)
ALP SERPL-CCNC: 104 U/L (ref 45–117)
ALT SERPL-CCNC: 21 U/L (ref 12–78)
ANION GAP SERPL CALC-SCNC: 4 MMOL/L (ref 5–15)
AST SERPL-CCNC: 16 U/L (ref 15–37)
BASOPHILS # BLD: 0 K/UL (ref 0–0.1)
BASOPHILS NFR BLD: 0 % (ref 0–1)
BILIRUB SERPL-MCNC: 0.5 MG/DL (ref 0.2–1)
BUN SERPL-MCNC: 5 MG/DL (ref 6–20)
BUN/CREAT SERPL: 5 (ref 12–20)
CALCIUM SERPL-MCNC: 8.1 MG/DL (ref 8.5–10.1)
CHLORIDE SERPL-SCNC: 104 MMOL/L (ref 97–108)
CO2 SERPL-SCNC: 25 MMOL/L (ref 21–32)
CREAT SERPL-MCNC: 0.91 MG/DL (ref 0.7–1.3)
DIFFERENTIAL METHOD BLD: ABNORMAL
EOSINOPHIL # BLD: 0.2 K/UL (ref 0–0.4)
EOSINOPHIL NFR BLD: 2 % (ref 0–7)
ERYTHROCYTE [DISTWIDTH] IN BLOOD BY AUTOMATED COUNT: 11.9 % (ref 11.5–14.5)
GLOBULIN SER CALC-MCNC: 4.7 G/DL (ref 2–4)
GLUCOSE BLD STRIP.AUTO-MCNC: 141 MG/DL (ref 65–117)
GLUCOSE SERPL-MCNC: 104 MG/DL (ref 65–100)
HCT VFR BLD AUTO: 32.8 % (ref 36.6–50.3)
HGB BLD-MCNC: 10.6 G/DL (ref 12.1–17)
IMM GRANULOCYTES # BLD AUTO: 0 K/UL (ref 0–0.04)
IMM GRANULOCYTES NFR BLD AUTO: 0 % (ref 0–0.5)
LYMPHOCYTES # BLD: 1 K/UL (ref 0.8–3.5)
LYMPHOCYTES NFR BLD: 13 % (ref 12–49)
MCH RBC QN AUTO: 31.7 PG (ref 26–34)
MCHC RBC AUTO-ENTMCNC: 32.3 G/DL (ref 30–36.5)
MCV RBC AUTO: 98.2 FL (ref 80–99)
MONOCYTES # BLD: 0.9 K/UL (ref 0–1)
MONOCYTES NFR BLD: 11 % (ref 5–13)
NEUTS SEG # BLD: 5.8 K/UL (ref 1.8–8)
NEUTS SEG NFR BLD: 74 % (ref 32–75)
NRBC # BLD: 0 K/UL (ref 0–0.01)
NRBC BLD-RTO: 0 PER 100 WBC
PLATELET # BLD AUTO: 248 K/UL (ref 150–400)
PMV BLD AUTO: 10.3 FL (ref 8.9–12.9)
POTASSIUM SERPL-SCNC: 4 MMOL/L (ref 3.5–5.1)
PROT SERPL-MCNC: 7.1 G/DL (ref 6.4–8.2)
RBC # BLD AUTO: 3.34 M/UL (ref 4.1–5.7)
SERVICE CMNT-IMP: ABNORMAL
SODIUM SERPL-SCNC: 133 MMOL/L (ref 136–145)
WBC # BLD AUTO: 7.8 K/UL (ref 4.1–11.1)

## 2022-12-13 PROCEDURE — 85025 COMPLETE CBC W/AUTO DIFF WBC: CPT

## 2022-12-13 PROCEDURE — 74011250636 HC RX REV CODE- 250/636: Performed by: COLON & RECTAL SURGERY

## 2022-12-13 PROCEDURE — 74011000258 HC RX REV CODE- 258: Performed by: COLON & RECTAL SURGERY

## 2022-12-13 PROCEDURE — 74011250637 HC RX REV CODE- 250/637: Performed by: NURSE PRACTITIONER

## 2022-12-13 PROCEDURE — 65270000029 HC RM PRIVATE

## 2022-12-13 PROCEDURE — 36415 COLL VENOUS BLD VENIPUNCTURE: CPT

## 2022-12-13 PROCEDURE — 82962 GLUCOSE BLOOD TEST: CPT

## 2022-12-13 PROCEDURE — 74011250637 HC RX REV CODE- 250/637: Performed by: COLON & RECTAL SURGERY

## 2022-12-13 PROCEDURE — 80053 COMPREHEN METABOLIC PANEL: CPT

## 2022-12-13 RX ADMIN — PIPERACILLIN AND TAZOBACTAM 3.38 G: 3; .375 INJECTION, POWDER, LYOPHILIZED, FOR SOLUTION INTRAVENOUS at 03:24

## 2022-12-13 RX ADMIN — PIPERACILLIN AND TAZOBACTAM 3.38 G: 3; .375 INJECTION, POWDER, LYOPHILIZED, FOR SOLUTION INTRAVENOUS at 10:27

## 2022-12-13 RX ADMIN — ENOXAPARIN SODIUM 40 MG: 100 INJECTION SUBCUTANEOUS at 09:00

## 2022-12-13 RX ADMIN — Medication 1 CAPSULE: at 09:00

## 2022-12-13 RX ADMIN — PIPERACILLIN AND TAZOBACTAM 3.38 G: 3; .375 INJECTION, POWDER, LYOPHILIZED, FOR SOLUTION INTRAVENOUS at 21:00

## 2022-12-13 RX ADMIN — MORPHINE SULFATE 2 MG: 2 INJECTION, SOLUTION INTRAMUSCULAR; INTRAVENOUS at 22:05

## 2022-12-13 RX ADMIN — POTASSIUM CHLORIDE, DEXTROSE MONOHYDRATE AND SODIUM CHLORIDE 75 ML/HR: 150; 5; 450 INJECTION, SOLUTION INTRAVENOUS at 16:39

## 2022-12-13 RX ADMIN — ACETAMINOPHEN 650 MG: 325 TABLET ORAL at 08:26

## 2022-12-13 RX ADMIN — ACETAMINOPHEN 650 MG: 325 TABLET ORAL at 16:32

## 2022-12-13 NOTE — PROGRESS NOTES
ID Progress Note  2022    Subjective:     Having some loose stools which has been his baseline since the colonoscopy  Review of Systems:            Symptom Y/N Comments   Symptom Y/N Comments   Fever/Chills n      Chest Pain n       Poor Appetite       Edema        Cough       Abdominal Pain n       Sputum       Joint Pain        SOB/FISH  n     Pruritis/Rash        Nausea/vomit  n     Tolerating PT/OT        Diarrhea  y     Tolerating Diet        Constipation       Other           Could NOT obtain due to:       Objective:     Vitals: Visit Vitals  /69   Pulse 82   Temp 98.8 °F (37.1 °C)   Resp 16   SpO2 93%          Tmax:  Temp (24hrs), Av.8 °F (37.1 °C), Min:98 °F (36.7 °C), Max:99.6 °F (37.6 °C)      PHYSICAL EXAM:  General: WD, WN. Alert, cooperative, no acute distress    EENT:  EOMI. Anicteric sclerae. MMM  Resp:  CTA bilaterally, no wheezing or rales. No accessory muscle use  CV:  Regular  rhythm,  No edema  GI:  Soft, Non distended, Non tender. +Bowel sounds  Neurologic:  Alert and oriented X 3, normal speech,   Psych:   Good insight. Not anxious nor agitated  Skin:  No rashes. No jaundice    Labs:   Lab Results   Component Value Date/Time    WBC 7.8 2022 03:28 AM    HGB 10.6 (L) 2022 03:28 AM    HCT 32.8 (L) 2022 03:28 AM    PLATELET 025 5024 03:28 AM    MCV 98.2 2022 03:28 AM     Lab Results   Component Value Date/Time    Sodium 133 (L) 2022 03:28 AM    Potassium 4.0 2022 03:28 AM    Chloride 104 2022 03:28 AM    CO2 25 2022 03:28 AM    Anion gap 4 (L) 2022 03:28 AM    Glucose 104 (H) 2022 03:28 AM    BUN 5 (L) 2022 03:28 AM    Creatinine 0.91 2022 03:28 AM    BUN/Creatinine ratio 5 (L) 2022 03:28 AM    Calcium 8.1 (L) 2022 03:28 AM    Bilirubin, total 0.5 2022 03:28 AM    Alk.  phosphatase 104 2022 03:28 AM    Protein, total 7.1 2022 03:28 AM    Albumin 2.4 (L) 2022 03:28 AM Globulin 4.7 (H) 12/13/2022 03:28 AM    A-G Ratio 0.5 (L) 12/13/2022 03:28 AM    ALT (SGPT) 21 12/13/2022 03:28 AM         Cultures:   Results       Procedure Component Value Units Date/Time    COVID-19 WITH INFLUENZA A/B [[id number]] Collected: 12/09/22 1030    Order Status: Completed Specimen: Nasopharyngeal Updated: 12/09/22 1330     SARS-CoV-2 by PCR Not detected        Comment: Not Detected results do not preclude SARS-CoV-2 infection and should not be used as the sole basis for patient management decisions. Results must be combined with clinical observations, patient history, and epidemiological information. Influenza A by PCR Not detected        Influenza B by PCR Not detected        Comment: Testing was performed using arsenio Nora SARS-CoV-2 and Influenza A/B nucleic acid assay. This test is a multiplex Real-Time Reverse Transcriptase Polymerase Chain Reaction (RT-PCR) based in vitro diagnostic test intended for the qualitative detection of nucleic acids from SARS-CoV-2, Influenza A, and Influenza B in nasopharyngeal and nasal swab specimens for use under the FDA's Emergency Use Authorization (EUA) only. Fact sheet for Patients: FindDrives.pl  Fact sheet for Healthcare Providers: FindDrives.pl         CULTURE, BLOOD, PAIRED [[id number]] Collected: 12/08/22 1445    Order Status: Completed Specimen: Blood Updated: 12/13/22 0610     Special Requests: NO SPECIAL REQUESTS        Culture result: NO GROWTH 5 DAYS                 Impression:   Diverticulitis  Fever  - T-max 101, wbc 11.6->9.7    Blood cx (12/8) no growth so far    CT of abd/pel (12/8) Increased moderate distal descending colon diverticulitis. Thick walled  nonacute abscess is not amenable to percutaneous drainage. Inflammation/infection involves the combined interfascial plane and adjacent musculature. Patient is at risk for developing sinus tract.      Tobacco abuse  - nicotine patchy ordered  Plan:    Continue with IV zosyn   Continue with Probiotic therapy   Adjust abx prn   CRS following; no drainable abscess, plan for repeat CT tomorrow   Fever work up if temp >= 100.4 every 24 hours   Recommend influenza vaccine prior to discharge; pt agreed    Above plan of care discussed and agreed with Dr. Lety Shaffer, NP

## 2022-12-13 NOTE — PROGRESS NOTES
Still having some fevers. Pain is well controlled  Visit Vitals  /77   Pulse 86   Temp (!) 101.1 °F (38.4 °C)   Resp 14   SpO2 94%     Date 12/12/22 0700 - 12/13/22 0659 12/13/22 0700 - 12/14/22 0659   Shift 9562-2605 7065-3902 24 Hour Total 7196-2149 6822-1353 24 Hour Total   INTAKE   P.O. 450  450        P. O. 450  450      Shift Total 450  450      OUTPUT   Urine           Urine Occurrence(s) 2 x  2 x      Stool           Stool Occurrence(s) 1 x  1 x      Shift Total           450      Weight (kg)           Abd soft  Tender in LLQ but no rebound    A/p ct tomorrow to see if an abscess has developed. Persistent fevers is concerning that he will require surgery sooner rather than later. An operation up front will likely require a temporary stoma which he would like to avoid.

## 2022-12-13 NOTE — PROGRESS NOTES
RUR: 9% Low     MINDA: Anticipated discharge home. Patient's wife will provide transport home once medically stable. Follow-up with PCP/specialist.      Primary Contact: Wife, Althea Maher, [phone]     12:30PM - CM reviewed chart. Per review and ID rounds, patient remains on IV abx. Plan for repeat CT tomorrow to assess if abscess has been developed. Discharge pending medical progress and final recommendations. CM will continue to follow as needed.      Kartik Fernandez, MARIE   [phone]

## 2022-12-14 ENCOUNTER — APPOINTMENT (OUTPATIENT)
Dept: CT IMAGING | Age: 57
DRG: 330 | End: 2022-12-14
Attending: COLON & RECTAL SURGERY
Payer: COMMERCIAL

## 2022-12-14 LAB
ALBUMIN SERPL-MCNC: 2.6 G/DL (ref 3.5–5)
ALBUMIN/GLOB SERPL: 0.5 {RATIO} (ref 1.1–2.2)
ALP SERPL-CCNC: 126 U/L (ref 45–117)
ALT SERPL-CCNC: 21 U/L (ref 12–78)
ANION GAP SERPL CALC-SCNC: 5 MMOL/L (ref 5–15)
AST SERPL-CCNC: 12 U/L (ref 15–37)
BACTERIA SPEC CULT: NORMAL
BASOPHILS # BLD: 0 K/UL (ref 0–0.1)
BASOPHILS NFR BLD: 0 % (ref 0–1)
BILIRUB SERPL-MCNC: 0.7 MG/DL (ref 0.2–1)
BUN SERPL-MCNC: 5 MG/DL (ref 6–20)
BUN/CREAT SERPL: 5 (ref 12–20)
CALCIUM SERPL-MCNC: 8.4 MG/DL (ref 8.5–10.1)
CHLORIDE SERPL-SCNC: 103 MMOL/L (ref 97–108)
CO2 SERPL-SCNC: 25 MMOL/L (ref 21–32)
CREAT SERPL-MCNC: 0.97 MG/DL (ref 0.7–1.3)
DIFFERENTIAL METHOD BLD: ABNORMAL
EOSINOPHIL # BLD: 0.1 K/UL (ref 0–0.4)
EOSINOPHIL NFR BLD: 1 % (ref 0–7)
ERYTHROCYTE [DISTWIDTH] IN BLOOD BY AUTOMATED COUNT: 11.9 % (ref 11.5–14.5)
GLOBULIN SER CALC-MCNC: 5 G/DL (ref 2–4)
GLUCOSE SERPL-MCNC: 127 MG/DL (ref 65–100)
HCT VFR BLD AUTO: 33.6 % (ref 36.6–50.3)
HGB BLD-MCNC: 11 G/DL (ref 12.1–17)
IMM GRANULOCYTES # BLD AUTO: 0 K/UL (ref 0–0.04)
IMM GRANULOCYTES NFR BLD AUTO: 0 % (ref 0–0.5)
LYMPHOCYTES # BLD: 1.2 K/UL (ref 0.8–3.5)
LYMPHOCYTES NFR BLD: 11 % (ref 12–49)
MCH RBC QN AUTO: 31.3 PG (ref 26–34)
MCHC RBC AUTO-ENTMCNC: 32.7 G/DL (ref 30–36.5)
MCV RBC AUTO: 95.7 FL (ref 80–99)
MONOCYTES # BLD: 1 K/UL (ref 0–1)
MONOCYTES NFR BLD: 10 % (ref 5–13)
NEUTS SEG # BLD: 8 K/UL (ref 1.8–8)
NEUTS SEG NFR BLD: 78 % (ref 32–75)
NRBC # BLD: 0 K/UL (ref 0–0.01)
NRBC BLD-RTO: 0 PER 100 WBC
PLATELET # BLD AUTO: 278 K/UL (ref 150–400)
PMV BLD AUTO: 10.1 FL (ref 8.9–12.9)
POTASSIUM SERPL-SCNC: 3.9 MMOL/L (ref 3.5–5.1)
PROT SERPL-MCNC: 7.6 G/DL (ref 6.4–8.2)
RBC # BLD AUTO: 3.51 M/UL (ref 4.1–5.7)
SERVICE CMNT-IMP: NORMAL
SODIUM SERPL-SCNC: 133 MMOL/L (ref 136–145)
WBC # BLD AUTO: 10.4 K/UL (ref 4.1–11.1)

## 2022-12-14 PROCEDURE — 36415 COLL VENOUS BLD VENIPUNCTURE: CPT

## 2022-12-14 PROCEDURE — 74011250637 HC RX REV CODE- 250/637: Performed by: COLON & RECTAL SURGERY

## 2022-12-14 PROCEDURE — 74011250636 HC RX REV CODE- 250/636: Performed by: COLON & RECTAL SURGERY

## 2022-12-14 PROCEDURE — 36573 INSJ PICC RS&I 5 YR+: CPT | Performed by: COLON & RECTAL SURGERY

## 2022-12-14 PROCEDURE — 65270000029 HC RM PRIVATE

## 2022-12-14 PROCEDURE — 74011250637 HC RX REV CODE- 250/637: Performed by: NURSE PRACTITIONER

## 2022-12-14 PROCEDURE — 74177 CT ABD & PELVIS W/CONTRAST: CPT

## 2022-12-14 PROCEDURE — 74011000258 HC RX REV CODE- 258: Performed by: COLON & RECTAL SURGERY

## 2022-12-14 PROCEDURE — 87040 BLOOD CULTURE FOR BACTERIA: CPT

## 2022-12-14 PROCEDURE — 85025 COMPLETE CBC W/AUTO DIFF WBC: CPT

## 2022-12-14 PROCEDURE — 80053 COMPREHEN METABOLIC PANEL: CPT

## 2022-12-14 PROCEDURE — 74011000250 HC RX REV CODE- 250: Performed by: COLON & RECTAL SURGERY

## 2022-12-14 PROCEDURE — 74011000636 HC RX REV CODE- 636: Performed by: STUDENT IN AN ORGANIZED HEALTH CARE EDUCATION/TRAINING PROGRAM

## 2022-12-14 RX ADMIN — PIPERACILLIN AND TAZOBACTAM 3.38 G: 3; .375 INJECTION, POWDER, LYOPHILIZED, FOR SOLUTION INTRAVENOUS at 04:57

## 2022-12-14 RX ADMIN — MORPHINE SULFATE 2 MG: 2 INJECTION, SOLUTION INTRAMUSCULAR; INTRAVENOUS at 03:01

## 2022-12-14 RX ADMIN — SODIUM CHLORIDE, PRESERVATIVE FREE 10 ML: 5 INJECTION INTRAVENOUS at 21:00

## 2022-12-14 RX ADMIN — PIPERACILLIN AND TAZOBACTAM 3.38 G: 3; .375 INJECTION, POWDER, LYOPHILIZED, FOR SOLUTION INTRAVENOUS at 14:13

## 2022-12-14 RX ADMIN — Medication 1 CAPSULE: at 14:20

## 2022-12-14 RX ADMIN — ACETAMINOPHEN 650 MG: 325 TABLET ORAL at 21:00

## 2022-12-14 RX ADMIN — PIPERACILLIN AND TAZOBACTAM 3.38 G: 3; .375 INJECTION, POWDER, LYOPHILIZED, FOR SOLUTION INTRAVENOUS at 21:00

## 2022-12-14 RX ADMIN — IOPAMIDOL 100 ML: 755 INJECTION, SOLUTION INTRAVENOUS at 11:38

## 2022-12-14 RX ADMIN — ACETAMINOPHEN 650 MG: 325 TABLET ORAL at 14:24

## 2022-12-14 RX ADMIN — MORPHINE SULFATE 2 MG: 2 INJECTION, SOLUTION INTRAMUSCULAR; INTRAVENOUS at 21:00

## 2022-12-14 NOTE — PROGRESS NOTES
ID Progress Note  2022    Subjective:     No changes with having loose stools     Review of Systems:            Symptom Y/N Comments   Symptom Y/N Comments   Fever/Chills n      Chest Pain n       Poor Appetite       Edema        Cough       Abdominal Pain n       Sputum       Joint Pain        SOB/FISH  n     Pruritis/Rash        Nausea/vomit  n     Tolerating PT/OT        Diarrhea  y     Tolerating Diet        Constipation       Other           Could NOT obtain due to:       Objective:     Vitals: Visit Vitals  /84   Pulse 87   Temp 98.6 °F (37 °C)   Resp 18   SpO2 93%          Tmax:  Temp (24hrs), Av.9 °F (37.2 °C), Min:98.6 °F (37 °C), Max:99.2 °F (37.3 °C)      PHYSICAL EXAM:  General: WD, WN. Alert, cooperative, no acute distress    EENT:  EOMI. Anicteric sclerae. MMM  Resp:  CTA bilaterally, no wheezing or rales. No accessory muscle use  CV:  Regular  rhythm,  No edema  GI:  Soft, Non distended, Non tender. +Bowel sounds  Neurologic:  Alert and oriented X 3, normal speech,   Psych:   Good insight. Not anxious nor agitated  Skin:  No rashes. No jaundice    Labs:   Lab Results   Component Value Date/Time    WBC 10.4 2022 02:27 AM    HGB 11.0 (L) 2022 02:27 AM    HCT 33.6 (L) 2022 02:27 AM    PLATELET 131  02:27 AM    MCV 95.7 2022 02:27 AM     Lab Results   Component Value Date/Time    Sodium 133 (L) 2022 02:27 AM    Potassium 3.9 2022 02:27 AM    Chloride 103 2022 02:27 AM    CO2 25 2022 02:27 AM    Anion gap 5 2022 02:27 AM    Glucose 127 (H) 2022 02:27 AM    BUN 5 (L) 2022 02:27 AM    Creatinine 0.97 2022 02:27 AM    BUN/Creatinine ratio 5 (L) 2022 02:27 AM    Calcium 8.4 (L) 2022 02:27 AM    Bilirubin, total 0.7 2022 02:27 AM    Alk.  phosphatase 126 (H) 2022 02:27 AM    Protein, total 7.6 2022 02:27 AM    Albumin 2.6 (L) 2022 02:27 AM    Globulin 5.0 (H) 2022 02:27 AM A-G Ratio 0.5 (L) 12/14/2022 02:27 AM    ALT (SGPT) 21 12/14/2022 02:27 AM         Cultures:   Results       Procedure Component Value Units Date/Time    COVID-19 WITH INFLUENZA A/B [[id number]] Collected: 12/09/22 1030    Order Status: Completed Specimen: Nasopharyngeal Updated: 12/09/22 1330     SARS-CoV-2 by PCR Not detected        Comment: Not Detected results do not preclude SARS-CoV-2 infection and should not be used as the sole basis for patient management decisions. Results must be combined with clinical observations, patient history, and epidemiological information. Influenza A by PCR Not detected        Influenza B by PCR Not detected        Comment: Testing was performed using arsenio Nora SARS-CoV-2 and Influenza A/B nucleic acid assay. This test is a multiplex Real-Time Reverse Transcriptase Polymerase Chain Reaction (RT-PCR) based in vitro diagnostic test intended for the qualitative detection of nucleic acids from SARS-CoV-2, Influenza A, and Influenza B in nasopharyngeal and nasal swab specimens for use under the FDA's Emergency Use Authorization (EUA) only. Fact sheet for Patients: FindDrives.pl  Fact sheet for Healthcare Providers: FindDrives.pl         CULTURE, BLOOD, PAIRED [[id number]] Collected: 12/08/22 1445    Order Status: Completed Specimen: Blood Updated: 12/14/22 1555     Special Requests: NO SPECIAL REQUESTS        Culture result: NO GROWTH 6 DAYS                 Impression:   Diverticulitis  Fever  - T-max 101, wbc 10.4    Blood cx (12/8) no growth so far, (12/14) pending    CT of abd/pel (12/8) Increased moderate distal descending colon diverticulitis. Thick walled  nonacute abscess is not amenable to percutaneous drainage. Inflammation/infection involves the combined interfascial plane and adjacent musculature. Patient is at risk for developing sinus tract.      Tobacco abuse  - nicotine patchy ordered  Plan: Continue with IV zosyn   Continue with Probiotic therapy   Adjust abx prn   CRS following; no drainable abscess, plan for repeat CT    Fever work up if temp >= 100.4 every 24 hours         -> fever with normal wbc; recommend source control             Pair of BC pending    Recommend influenza vaccine prior to discharge; pt agreed    Above plan of care discussed and agreed with Dr. Tash Perez, NP

## 2022-12-15 LAB
BASOPHILS # BLD: 0 K/UL (ref 0–0.1)
BASOPHILS NFR BLD: 0 % (ref 0–1)
DIFFERENTIAL METHOD BLD: ABNORMAL
EOSINOPHIL # BLD: 0 K/UL (ref 0–0.4)
EOSINOPHIL NFR BLD: 0 % (ref 0–7)
ERYTHROCYTE [DISTWIDTH] IN BLOOD BY AUTOMATED COUNT: 11.9 % (ref 11.5–14.5)
HCT VFR BLD AUTO: 30.1 % (ref 36.6–50.3)
HGB BLD-MCNC: 10.1 G/DL (ref 12.1–17)
IMM GRANULOCYTES # BLD AUTO: 0.1 K/UL (ref 0–0.04)
IMM GRANULOCYTES NFR BLD AUTO: 1 % (ref 0–0.5)
LYMPHOCYTES # BLD: 1.2 K/UL (ref 0.8–3.5)
LYMPHOCYTES NFR BLD: 9 % (ref 12–49)
MCH RBC QN AUTO: 31.1 PG (ref 26–34)
MCHC RBC AUTO-ENTMCNC: 33.6 G/DL (ref 30–36.5)
MCV RBC AUTO: 92.6 FL (ref 80–99)
MONOCYTES # BLD: 1.5 K/UL (ref 0–1)
MONOCYTES NFR BLD: 12 % (ref 5–13)
NEUTS SEG # BLD: 9.8 K/UL (ref 1.8–8)
NEUTS SEG NFR BLD: 78 % (ref 32–75)
NRBC # BLD: 0 K/UL (ref 0–0.01)
NRBC BLD-RTO: 0 PER 100 WBC
PLATELET # BLD AUTO: 291 K/UL (ref 150–400)
PMV BLD AUTO: 10.9 FL (ref 8.9–12.9)
RBC # BLD AUTO: 3.25 M/UL (ref 4.1–5.7)
WBC # BLD AUTO: 12.6 K/UL (ref 4.1–11.1)

## 2022-12-15 PROCEDURE — 74011000258 HC RX REV CODE- 258: Performed by: STUDENT IN AN ORGANIZED HEALTH CARE EDUCATION/TRAINING PROGRAM

## 2022-12-15 PROCEDURE — 74011250636 HC RX REV CODE- 250/636: Performed by: COLON & RECTAL SURGERY

## 2022-12-15 PROCEDURE — C1751 CATH, INF, PER/CENT/MIDLINE: HCPCS

## 2022-12-15 PROCEDURE — 74011000250 HC RX REV CODE- 250: Performed by: COLON & RECTAL SURGERY

## 2022-12-15 PROCEDURE — 77030020365 HC SOL INJ SOD CL 0.9% 50ML

## 2022-12-15 PROCEDURE — 36415 COLL VENOUS BLD VENIPUNCTURE: CPT

## 2022-12-15 PROCEDURE — 74011000258 HC RX REV CODE- 258: Performed by: COLON & RECTAL SURGERY

## 2022-12-15 PROCEDURE — 74011000250 HC RX REV CODE- 250: Performed by: STUDENT IN AN ORGANIZED HEALTH CARE EDUCATION/TRAINING PROGRAM

## 2022-12-15 PROCEDURE — 65270000029 HC RM PRIVATE

## 2022-12-15 PROCEDURE — 74011250636 HC RX REV CODE- 250/636: Performed by: STUDENT IN AN ORGANIZED HEALTH CARE EDUCATION/TRAINING PROGRAM

## 2022-12-15 PROCEDURE — 76937 US GUIDE VASCULAR ACCESS: CPT

## 2022-12-15 PROCEDURE — 85025 COMPLETE CBC W/AUTO DIFF WBC: CPT

## 2022-12-15 PROCEDURE — 74011250637 HC RX REV CODE- 250/637: Performed by: NURSE PRACTITIONER

## 2022-12-15 PROCEDURE — 74011250637 HC RX REV CODE- 250/637: Performed by: COLON & RECTAL SURGERY

## 2022-12-15 RX ADMIN — PIPERACILLIN AND TAZOBACTAM 3.38 G: 3; .375 INJECTION, POWDER, LYOPHILIZED, FOR SOLUTION INTRAVENOUS at 14:22

## 2022-12-15 RX ADMIN — SODIUM CHLORIDE, PRESERVATIVE FREE 10 ML: 5 INJECTION INTRAVENOUS at 22:14

## 2022-12-15 RX ADMIN — ACETAMINOPHEN 650 MG: 325 TABLET ORAL at 09:31

## 2022-12-15 RX ADMIN — PIPERACILLIN AND TAZOBACTAM 3.38 G: 3; .375 INJECTION, POWDER, LYOPHILIZED, FOR SOLUTION INTRAVENOUS at 22:14

## 2022-12-15 RX ADMIN — PIPERACILLIN AND TAZOBACTAM 3.38 G: 3; .375 INJECTION, POWDER, LYOPHILIZED, FOR SOLUTION INTRAVENOUS at 04:42

## 2022-12-15 RX ADMIN — CALCIUM GLUCONATE: 94 INJECTION, SOLUTION INTRAVENOUS at 18:43

## 2022-12-15 RX ADMIN — POTASSIUM CHLORIDE, DEXTROSE MONOHYDRATE AND SODIUM CHLORIDE 75 ML/HR: 150; 5; 450 INJECTION, SOLUTION INTRAVENOUS at 04:41

## 2022-12-15 RX ADMIN — ENOXAPARIN SODIUM 40 MG: 100 INJECTION SUBCUTANEOUS at 09:31

## 2022-12-15 RX ADMIN — ACETAMINOPHEN 650 MG: 325 TABLET ORAL at 22:14

## 2022-12-15 RX ADMIN — MORPHINE SULFATE 2 MG: 2 INJECTION, SOLUTION INTRAMUSCULAR; INTRAVENOUS at 01:32

## 2022-12-15 RX ADMIN — Medication 1 CAPSULE: at 09:31

## 2022-12-15 RX ADMIN — POTASSIUM CHLORIDE, DEXTROSE MONOHYDRATE AND SODIUM CHLORIDE 75 ML/HR: 150; 5; 450 INJECTION, SOLUTION INTRAVENOUS at 14:23

## 2022-12-15 RX ADMIN — POTASSIUM CHLORIDE, DEXTROSE MONOHYDRATE AND SODIUM CHLORIDE 75 ML/HR: 150; 5; 450 INJECTION, SOLUTION INTRAVENOUS at 18:39

## 2022-12-15 RX ADMIN — SODIUM CHLORIDE, PRESERVATIVE FREE 10 ML: 5 INJECTION INTRAVENOUS at 14:24

## 2022-12-15 RX ADMIN — MORPHINE SULFATE 2 MG: 2 INJECTION, SOLUTION INTRAMUSCULAR; INTRAVENOUS at 18:39

## 2022-12-15 RX ADMIN — MORPHINE SULFATE 2 MG: 2 INJECTION, SOLUTION INTRAMUSCULAR; INTRAVENOUS at 23:49

## 2022-12-15 NOTE — PROGRESS NOTES
RUR: 9% Low     MINDA: Anticipated discharge home. Patient's wife will provide transport home once medically stable. Follow-up with PCP/specialist.      Primary Contact: Wife, Jeremy Muse, [phone]     12:30PM - CM reviewed chart. Per review and ID rounds, plan for OR for laparoscopy possible open sigmoid colectomy on Wednesday, 12/21. PICC to be placed for TPN. Discharge pending medical progress and final recommendations. CM will continue to follow as needed.      Awilda Grissom, MSW   [phone]

## 2022-12-15 NOTE — PROGRESS NOTES
Request received Dr Bereket Grant to coordinate with Divya Tran concerning pre op stent placement   Thank you

## 2022-12-15 NOTE — PROGRESS NOTES
CRS Note    Known to me from prior admission and outpatient setting. Admitted with smoldering diverticulitis, no drainable abscess on repeat CT but ongoing inflammation. Continues to spike low grade fevers. Abd soft, nondistended, minimally tender LLQ    Labs reviewed    A/P:  57M with diverticulitis not improving on IV abx.   - will plan for OR for lap possible open sigmoid colectomy Weds, Dec 21  - plan for ostomy, will ask ostomy team to see and sujatha before surgery  - would like ureteral stents given inflammation and phlegmon involving psoas making this high risk for ureteral injury.  I have a call out to urology and will coordinate with them  - agree with PICC and TPN in interim  - discussed above with pt who understands and is in agreement    Crystal Bright MD   [facility]  [phone]

## 2022-12-15 NOTE — PROGRESS NOTES
ID Progress Note  12/15/2022    Subjective:     No changes with having loose stools     Review of Systems:            Symptom Y/N Comments   Symptom Y/N Comments   Fever/Chills n      Chest Pain n       Poor Appetite       Edema        Cough       Abdominal Pain n       Sputum       Joint Pain        SOB/FISH  n     Pruritis/Rash        Nausea/vomit  n     Tolerating PT/OT        Diarrhea  y     Tolerating Diet        Constipation       Other           Could NOT obtain due to:       Objective:     Vitals: Visit Vitals  /71   Pulse 77   Temp 98.5 °F (36.9 °C)   Resp 18   Wt 85.1 kg (187 lb 11.2 oz)   SpO2 96%   BMI 27.72 kg/m²          Tmax:  Temp (24hrs), Av.7 °F (37.6 °C), Min:98.5 °F (36.9 °C), Max:100.9 °F (38.3 °C)      PHYSICAL EXAM:  General: WD, WN. Alert, cooperative, no acute distress    EENT:  EOMI. Anicteric sclerae. MMM  Resp:  CTA bilaterally, no wheezing or rales. No accessory muscle use  CV:  Regular  rhythm,  No edema  GI:  Soft, Non distended, Non tender. +Bowel sounds  Neurologic:  Alert and oriented X 3, normal speech,   Psych:   Good insight. Not anxious nor agitated  Skin:  No rashes. No jaundice    Labs:   Lab Results   Component Value Date/Time    WBC 10.4 2022 02:27 AM    HGB 11.0 (L) 2022 02:27 AM    HCT 33.6 (L) 2022 02:27 AM    PLATELET 200  02:27 AM    MCV 95.7 2022 02:27 AM     Lab Results   Component Value Date/Time    Sodium 133 (L) 2022 02:27 AM    Potassium 3.9 2022 02:27 AM    Chloride 103 2022 02:27 AM    CO2 25 2022 02:27 AM    Anion gap 5 2022 02:27 AM    Glucose 127 (H) 2022 02:27 AM    BUN 5 (L) 2022 02:27 AM    Creatinine 0.97 2022 02:27 AM    BUN/Creatinine ratio 5 (L) 2022 02:27 AM    Calcium 8.4 (L) 2022 02:27 AM    Bilirubin, total 0.7 2022 02:27 AM    Alk.  phosphatase 126 (H) 2022 02:27 AM    Protein, total 7.6 2022 02:27 AM    Albumin 2.6 (L) 12/14/2022 02:27 AM    Globulin 5.0 (H) 12/14/2022 02:27 AM    A-G Ratio 0.5 (L) 12/14/2022 02:27 AM    ALT (SGPT) 21 12/14/2022 02:27 AM         Cultures:   Results       Procedure Component Value Units Date/Time    CULTURE, BLOOD, PAIRED [[id number]] Collected: 12/14/22 0901    Order Status: Completed Specimen: Blood Updated: 12/15/22 0616     Special Requests: NO SPECIAL REQUESTS        Culture result: NO GROWTH AFTER 19 HOURS       COVID-19 WITH INFLUENZA A/B [[id number]] Collected: 12/09/22 1030    Order Status: Completed Specimen: Nasopharyngeal Updated: 12/09/22 1330     SARS-CoV-2 by PCR Not detected        Comment: Not Detected results do not preclude SARS-CoV-2 infection and should not be used as the sole basis for patient management decisions. Results must be combined with clinical observations, patient history, and epidemiological information. Influenza A by PCR Not detected        Influenza B by PCR Not detected        Comment: Testing was performed using arsenio Nora SARS-CoV-2 and Influenza A/B nucleic acid assay. This test is a multiplex Real-Time Reverse Transcriptase Polymerase Chain Reaction (RT-PCR) based in vitro diagnostic test intended for the qualitative detection of nucleic acids from SARS-CoV-2, Influenza A, and Influenza B in nasopharyngeal and nasal swab specimens for use under the FDA's Emergency Use Authorization (EUA) only.        Fact sheet for Patients: FindDrives.pl  Fact sheet for Healthcare Providers: FindDrives.pl         CULTURE, BLOOD, PAIRED [[id number]] Collected: 12/08/22 1445    Order Status: Completed Specimen: Blood Updated: 12/14/22 6550     Special Requests: NO SPECIAL REQUESTS        Culture result: NO GROWTH 6 DAYS                 Impression:   Diverticulitis  Fever  - T-max 100.9, wbc 10.4; ordered am lab    Blood cx (12/8) no growth (12/14) no growth so far    CT of abd/pel (12/14) Persistent acute sigmoid colon diverticulitis with contained perforation. Mass like inflammatory changes and associated locules of gas and tiny pockets of fluid adjacent to the sigmoid colon, abutting the left iliopsoas muscle, are not significantly changed. No drainable collection is evident.      Tobacco abuse  - nicotine patchy ordered  Plan:    Continue with IV zosyn   Continue with Probiotic therapy   Adjust abx prn   CRS following; possible surgical intervention in near future   Fever work up if temp >= 100.4 every 24 hours         -> fever with normal wbc; recommend source control            D/w PICC team; ok to place PICC today for TPN   Recommend influenza vaccine prior to discharge; pt agreed    Above plan of care discussed and agreed with Dr. Chiquita Linda, NP

## 2022-12-16 LAB
BASOPHILS # BLD: 0 K/UL (ref 0–0.1)
BASOPHILS NFR BLD: 0 % (ref 0–1)
DIFFERENTIAL METHOD BLD: ABNORMAL
EOSINOPHIL # BLD: 0.1 K/UL (ref 0–0.4)
EOSINOPHIL NFR BLD: 1 % (ref 0–7)
ERYTHROCYTE [DISTWIDTH] IN BLOOD BY AUTOMATED COUNT: 12 % (ref 11.5–14.5)
GLUCOSE BLD STRIP.AUTO-MCNC: 189 MG/DL (ref 65–117)
HCT VFR BLD AUTO: 31 % (ref 36.6–50.3)
HGB BLD-MCNC: 10.2 G/DL (ref 12.1–17)
IMM GRANULOCYTES # BLD AUTO: 0.1 K/UL (ref 0–0.04)
IMM GRANULOCYTES NFR BLD AUTO: 1 % (ref 0–0.5)
LYMPHOCYTES # BLD: 1.1 K/UL (ref 0.8–3.5)
LYMPHOCYTES NFR BLD: 10 % (ref 12–49)
MCH RBC QN AUTO: 31.7 PG (ref 26–34)
MCHC RBC AUTO-ENTMCNC: 32.9 G/DL (ref 30–36.5)
MCV RBC AUTO: 96.3 FL (ref 80–99)
MONOCYTES # BLD: 1.4 K/UL (ref 0–1)
MONOCYTES NFR BLD: 13 % (ref 5–13)
NEUTS SEG # BLD: 8.1 K/UL (ref 1.8–8)
NEUTS SEG NFR BLD: 75 % (ref 32–75)
NRBC # BLD: 0 K/UL (ref 0–0.01)
NRBC BLD-RTO: 0 PER 100 WBC
PLATELET # BLD AUTO: 313 K/UL (ref 150–400)
PMV BLD AUTO: 10.6 FL (ref 8.9–12.9)
RBC # BLD AUTO: 3.22 M/UL (ref 4.1–5.7)
SERVICE CMNT-IMP: ABNORMAL
WBC # BLD AUTO: 10.7 K/UL (ref 4.1–11.1)

## 2022-12-16 PROCEDURE — 82962 GLUCOSE BLOOD TEST: CPT

## 2022-12-16 PROCEDURE — 65270000029 HC RM PRIVATE

## 2022-12-16 PROCEDURE — 74011250636 HC RX REV CODE- 250/636: Performed by: COLON & RECTAL SURGERY

## 2022-12-16 PROCEDURE — 74011000250 HC RX REV CODE- 250: Performed by: COLON & RECTAL SURGERY

## 2022-12-16 PROCEDURE — 74011250637 HC RX REV CODE- 250/637: Performed by: COLON & RECTAL SURGERY

## 2022-12-16 PROCEDURE — 85025 COMPLETE CBC W/AUTO DIFF WBC: CPT

## 2022-12-16 PROCEDURE — 74011250637 HC RX REV CODE- 250/637: Performed by: NURSE PRACTITIONER

## 2022-12-16 PROCEDURE — 36415 COLL VENOUS BLD VENIPUNCTURE: CPT

## 2022-12-16 PROCEDURE — 74011000258 HC RX REV CODE- 258: Performed by: COLON & RECTAL SURGERY

## 2022-12-16 RX ADMIN — PIPERACILLIN AND TAZOBACTAM 3.38 G: 3; .375 INJECTION, POWDER, LYOPHILIZED, FOR SOLUTION INTRAVENOUS at 12:26

## 2022-12-16 RX ADMIN — SODIUM CHLORIDE, PRESERVATIVE FREE 30 ML: 5 INJECTION INTRAVENOUS at 05:05

## 2022-12-16 RX ADMIN — POTASSIUM CHLORIDE, DEXTROSE MONOHYDRATE AND SODIUM CHLORIDE 75 ML/HR: 150; 5; 450 INJECTION, SOLUTION INTRAVENOUS at 05:06

## 2022-12-16 RX ADMIN — ACETAMINOPHEN 650 MG: 325 TABLET ORAL at 12:30

## 2022-12-16 RX ADMIN — MORPHINE SULFATE 2 MG: 2 INJECTION, SOLUTION INTRAMUSCULAR; INTRAVENOUS at 22:50

## 2022-12-16 RX ADMIN — MORPHINE SULFATE 2 MG: 2 INJECTION, SOLUTION INTRAMUSCULAR; INTRAVENOUS at 12:30

## 2022-12-16 RX ADMIN — I.V. FAT EMULSION 500 ML: 20 EMULSION INTRAVENOUS at 19:00

## 2022-12-16 RX ADMIN — PIPERACILLIN AND TAZOBACTAM 3.38 G: 3; .375 INJECTION, POWDER, LYOPHILIZED, FOR SOLUTION INTRAVENOUS at 20:06

## 2022-12-16 RX ADMIN — PIPERACILLIN AND TAZOBACTAM 3.38 G: 3; .375 INJECTION, POWDER, LYOPHILIZED, FOR SOLUTION INTRAVENOUS at 05:05

## 2022-12-16 RX ADMIN — ENOXAPARIN SODIUM 40 MG: 100 INJECTION SUBCUTANEOUS at 10:33

## 2022-12-16 RX ADMIN — MORPHINE SULFATE 2 MG: 2 INJECTION, SOLUTION INTRAMUSCULAR; INTRAVENOUS at 05:05

## 2022-12-16 RX ADMIN — MORPHINE SULFATE 2 MG: 2 INJECTION, SOLUTION INTRAMUSCULAR; INTRAVENOUS at 18:48

## 2022-12-16 RX ADMIN — ACETAMINOPHEN 650 MG: 325 TABLET ORAL at 19:56

## 2022-12-16 RX ADMIN — CALCIUM GLUCONATE: 94 INJECTION, SOLUTION INTRAVENOUS at 18:40

## 2022-12-16 RX ADMIN — Medication 1 CAPSULE: at 10:33

## 2022-12-16 NOTE — PROGRESS NOTES
ID Progress Note  2022    Subjective: Worsening of lower abd pain    Review of Systems:            Symptom Y/N Comments   Symptom Y/N Comments   Fever/Chills n      Chest Pain n       Poor Appetite       Edema        Cough       Abdominal Pain y      Sputum       Joint Pain        SOB/FISH  n     Pruritis/Rash        Nausea/vomit  n     Tolerating PT/OT        Diarrhea  y     Tolerating Diet        Constipation       Other           Could NOT obtain due to:       Objective:     Vitals: Visit Vitals  /75   Pulse 88   Temp 99.2 °F (37.3 °C)   Resp 18   Wt 85.1 kg (187 lb 11.2 oz)   SpO2 94%   BMI 27.72 kg/m²          Tmax:  Temp (24hrs), Av.6 °F (37.6 °C), Min:99.2 °F (37.3 °C), Max:100.4 °F (38 °C)      PHYSICAL EXAM:  General: WD, WN. Alert, cooperative, no acute distress    EENT:  EOMI. Anicteric sclerae. MMM  Resp:  CTA bilaterally, no wheezing or rales. No accessory muscle use  CV:  Regular  rhythm,  No edema  GI:  Soft, Non distended, Non tender. +Bowel sounds  Neurologic:  Alert and oriented X 3, normal speech,   Psych:   Good insight. Not anxious nor agitated  Skin:  No rashes. No jaundice    Labs:   Lab Results   Component Value Date/Time    WBC 10.7 2022 04:57 AM    HGB 10.2 (L) 2022 04:57 AM    HCT 31.0 (L) 2022 04:57 AM    PLATELET 015  04:57 AM    MCV 96.3 2022 04:57 AM     Lab Results   Component Value Date/Time    Sodium 133 (L) 2022 02:27 AM    Potassium 3.9 2022 02:27 AM    Chloride 103 2022 02:27 AM    CO2 25 2022 02:27 AM    Anion gap 5 2022 02:27 AM    Glucose 127 (H) 2022 02:27 AM    BUN 5 (L) 2022 02:27 AM    Creatinine 0.97 2022 02:27 AM    BUN/Creatinine ratio 5 (L) 2022 02:27 AM    Calcium 8.4 (L) 2022 02:27 AM    Bilirubin, total 0.7 2022 02:27 AM    Alk.  phosphatase 126 (H) 2022 02:27 AM    Protein, total 7.6 2022 02:27 AM    Albumin 2.6 (L) 2022 02:27 AM    Globulin 5.0 (H) 12/14/2022 02:27 AM    A-G Ratio 0.5 (L) 12/14/2022 02:27 AM    ALT (SGPT) 21 12/14/2022 02:27 AM         Cultures:   Results       Procedure Component Value Units Date/Time    CULTURE, BLOOD, PAIRED [[id number]] Collected: 12/14/22 0901    Order Status: Completed Specimen: Blood Updated: 12/15/22 0616     Special Requests: NO SPECIAL REQUESTS        Culture result: NO GROWTH AFTER 19 HOURS       COVID-19 WITH INFLUENZA A/B [[id number]] Collected: 12/09/22 1030    Order Status: Completed Specimen: Nasopharyngeal Updated: 12/09/22 1330     SARS-CoV-2 by PCR Not detected        Comment: Not Detected results do not preclude SARS-CoV-2 infection and should not be used as the sole basis for patient management decisions. Results must be combined with clinical observations, patient history, and epidemiological information. Influenza A by PCR Not detected        Influenza B by PCR Not detected        Comment: Testing was performed using arsenio Nora SARS-CoV-2 and Influenza A/B nucleic acid assay. This test is a multiplex Real-Time Reverse Transcriptase Polymerase Chain Reaction (RT-PCR) based in vitro diagnostic test intended for the qualitative detection of nucleic acids from SARS-CoV-2, Influenza A, and Influenza B in nasopharyngeal and nasal swab specimens for use under the FDA's Emergency Use Authorization (EUA) only.        Fact sheet for Patients: FindDrives.pl  Fact sheet for Healthcare Providers: FindDrives.pl         CULTURE, BLOOD, PAIRED [[id number]] Collected: 12/08/22 1445    Order Status: Completed Specimen: Blood Updated: 12/14/22 0633     Special Requests: NO SPECIAL REQUESTS        Culture result: NO GROWTH 6 DAYS                 Impression:   Diverticulitis  Fever  - T-max 100.6, wbc 10.6    Blood cx (12/8) no growth (12/14) no growth so far    CT of abd/pel (12/14) Persistent acute sigmoid colon diverticulitis with contained perforation. Mass like inflammatory changes and associated locules of gas and tiny pockets of fluid adjacent to the sigmoid colon, abutting the left iliopsoas muscle, are not significantly changed. No drainable collection is evident.      Tobacco abuse  - nicotine patchy ordered  Plan:    Continue with IV zosyn   Continue with Probiotic therapy   PICC line placed on 12/15 - TPN in progress   Adjust abx prn   CRS following; possible surgical intervention on 12/21   Fever work up if temp >= 100.4 every 24 hours         -> fever with normal wbc; recommend source control   Recommend influenza vaccine prior to discharge; pt agreed    Above plan of care discussed and agreed with Dr. Giovana Hunt, NP

## 2022-12-16 NOTE — PROGRESS NOTES
Comprehensive Nutrition Assessment    Type and Reason for Visit: Initial (New TPN)    Nutrition Recommendations/Plan:     Continue TPN as ordered:  D20, 5% AA at 75 ml/hr + 500 ml 20% lipids 3x/week    2. Can D/C IVF with TPN at goal rate    3. Continue Full liquid diet    4. Continue Ensure surgery BID         Malnutrition Assessment:  Malnutrition Status: Moderate malnutrition (12/16/22 1417)    Context:  Acute illness     Findings of the 6 clinical characteristics of malnutrition:   Energy Intake:  75% or less of est energy req for 7 or more days  Weight Loss:  5% over one month     Body Fat Loss:  No significant body fat loss,     Muscle Mass Loss:  No significant muscle mass loss,    Fluid Accumulation:  No significant fluid accumulation,     Strength:  Not performed        Nutrition Assessment:    66 yo male admitted for diverticulitis. Pmhx: diverticulitis, hypercholesterolemia. CT of ABD showed persistent abscess but not drainable, ongoing inflammation and low grade fevers. Plan for sigmoid colectomy next week. PICC placed 12/15 and TPN started. Currently ordered as D20, 5% AA at 75 ml/hr + 500 ml 20% lipids 3x/week. This is providing 9985-2408 ml, 2016 kcals (95% kcal needs), 90 gm protein (81% protein needs), 360 gm CHO. Will continue TPN as ordered. Spoke with pt at bedside. He has been on full liquid diet and tolerating PO. Intakes documented as % meals. He states he drinks the milk, sometimes the juice, and 1-2 Ensure shakes/day. He does not want broth. Previously had ensure clear ordered when he was on clear liquid diet and now he has ensure surgery ordered. Likes both the Ensure Clear and Ensure Surgery. With these and the milk providing additional protein, he is meeting his protein needs with PO + TPN. Weight hx in EMR indicates 5% weight loss over last 3 weeks which is significant for time frame. Labs reviewed.  TPN orderset ordered so Phos and Mg will be checked going forward (not checked this admission). Nutritionally Significant Medications:  Probiotic, D5 1/2 NaCl with 20mEq Kcl at 75 ml/hr      Estimated Daily Nutrient Needs:  Energy Requirements Based On: Kcal/kg  Weight Used for Energy Requirements: Current  Energy (kcal/day): 2130 (25 kcals/kg)  Weight Used for Protein Requirements: Current  Protein (g/day): 110 (1.3 gm/kg (20%))  Method Used for Fluid Requirements: 1 ml/kcal  Fluid (ml/day): 2130    Nutrition Related Findings:   Edema: none  Last BM: 12/12/22, Loose    Wounds: None      Current Nutrition Therapies:  Diet: Full liquids  Supplements: Ensure Surgery BID  Meal intake: Patient Vitals for the past 168 hrs:   % Diet Eaten   12/12/22 1000 76 - 100%   12/11/22 1000 76 - 100%     Supplement intake: No data found. Nutrition Support: TPN: D20, 5% AA at 75 ml/hr + 500 ml 20% lipids 3 x/week      Anthropometric Measures:  Height: 5' 9\" (175.3 cm)  Ideal Body Weight (IBW): 160 lbs (73 kg)     Current Body Wt:  85.1 kg (187 lb 9.8 oz), 117.3 % IBW. Not specified  Current BMI (kg/m2): 27.7        Weight Adjustment: No adjustment                 BMI Category: Overweight (BMI 25.0-29. 9)    Wt Readings from Last 10 Encounters:   12/14/22 85.1 kg (187 lb 11.2 oz)   11/23/22 90.3 kg (199 lb)           Nutrition Diagnosis:   Altered GI function related to altered GI structure, altered GI function as evidenced by GI abnormality, nutrition support-parenteral nutrition    Nutrition Interventions:   Food and/or Nutrient Delivery: Continue current diet, Continue oral nutrition supplement, Continue parenteral nutrition  Nutrition Education/Counseling: No recommendations at this time  Coordination of Nutrition Care: Continue to monitor while inpatient       Goals:     Goals: other (specify)  Specify Other Goals: Meet > 85% EEN's with PN + PO over next 5-7 days    Nutrition Monitoring and Evaluation:   Behavioral-Environmental Outcomes: None identified  Food/Nutrient Intake Outcomes: Diet advancement/tolerance, Food and nutrient intake, Supplement intake, Parenteral nutrition intake/tolerance  Physical Signs/Symptoms Outcomes: Biochemical data, GI status, Weight    Discharge Planning:     Too soon to determine    Samira Connors RD  Available via [employer]

## 2022-12-16 NOTE — PROGRESS NOTES
No complaints other than intermittent low grade fevers  Visit Vitals  /75   Pulse 88   Temp 99.2 °F (37.3 °C)   Resp 18   Wt 85.1 kg (187 lb 11.2 oz)   SpO2 94%   BMI 27.72 kg/m²       Abd soft    A/p tpn  Iv abx  Surgery next week

## 2022-12-17 LAB
ALBUMIN SERPL-MCNC: 2.3 G/DL (ref 3.5–5)
ALBUMIN/GLOB SERPL: 0.5 {RATIO} (ref 1.1–2.2)
ALP SERPL-CCNC: 139 U/L (ref 45–117)
ALT SERPL-CCNC: 22 U/L (ref 12–78)
ANION GAP SERPL CALC-SCNC: 7 MMOL/L (ref 5–15)
AST SERPL-CCNC: 13 U/L (ref 15–37)
BASOPHILS # BLD: 0 K/UL (ref 0–0.1)
BASOPHILS NFR BLD: 0 % (ref 0–1)
BILIRUB SERPL-MCNC: 0.3 MG/DL (ref 0.2–1)
BUN SERPL-MCNC: 9 MG/DL (ref 6–20)
BUN/CREAT SERPL: 10 (ref 12–20)
CALCIUM SERPL-MCNC: 8.8 MG/DL (ref 8.5–10.1)
CHLORIDE SERPL-SCNC: 105 MMOL/L (ref 97–108)
CO2 SERPL-SCNC: 23 MMOL/L (ref 21–32)
CREAT SERPL-MCNC: 0.94 MG/DL (ref 0.7–1.3)
DIFFERENTIAL METHOD BLD: ABNORMAL
EOSINOPHIL # BLD: 0.1 K/UL (ref 0–0.4)
EOSINOPHIL NFR BLD: 1 % (ref 0–7)
ERYTHROCYTE [DISTWIDTH] IN BLOOD BY AUTOMATED COUNT: 12 % (ref 11.5–14.5)
GLOBULIN SER CALC-MCNC: 5.1 G/DL (ref 2–4)
GLUCOSE BLD STRIP.AUTO-MCNC: 136 MG/DL (ref 65–117)
GLUCOSE BLD STRIP.AUTO-MCNC: 148 MG/DL (ref 65–117)
GLUCOSE SERPL-MCNC: 177 MG/DL (ref 65–100)
HCT VFR BLD AUTO: 29.9 % (ref 36.6–50.3)
HGB BLD-MCNC: 9.9 G/DL (ref 12.1–17)
IMM GRANULOCYTES # BLD AUTO: 0.1 K/UL (ref 0–0.04)
IMM GRANULOCYTES NFR BLD AUTO: 1 % (ref 0–0.5)
LYMPHOCYTES # BLD: 1.3 K/UL (ref 0.8–3.5)
LYMPHOCYTES NFR BLD: 11 % (ref 12–49)
MAGNESIUM SERPL-MCNC: 2.3 MG/DL (ref 1.6–2.4)
MCH RBC QN AUTO: 31.3 PG (ref 26–34)
MCHC RBC AUTO-ENTMCNC: 33.1 G/DL (ref 30–36.5)
MCV RBC AUTO: 94.6 FL (ref 80–99)
MONOCYTES # BLD: 1.5 K/UL (ref 0–1)
MONOCYTES NFR BLD: 13 % (ref 5–13)
NEUTS SEG # BLD: 8.7 K/UL (ref 1.8–8)
NEUTS SEG NFR BLD: 74 % (ref 32–75)
NRBC # BLD: 0 K/UL (ref 0–0.01)
NRBC BLD-RTO: 0 PER 100 WBC
PHOSPHATE SERPL-MCNC: 3 MG/DL (ref 2.6–4.7)
PLATELET # BLD AUTO: 316 K/UL (ref 150–400)
PMV BLD AUTO: 11 FL (ref 8.9–12.9)
POTASSIUM SERPL-SCNC: 4 MMOL/L (ref 3.5–5.1)
PROT SERPL-MCNC: 7.4 G/DL (ref 6.4–8.2)
RBC # BLD AUTO: 3.16 M/UL (ref 4.1–5.7)
SERVICE CMNT-IMP: ABNORMAL
SERVICE CMNT-IMP: ABNORMAL
SODIUM SERPL-SCNC: 135 MMOL/L (ref 136–145)
WBC # BLD AUTO: 11.7 K/UL (ref 4.1–11.1)

## 2022-12-17 PROCEDURE — 74011000250 HC RX REV CODE- 250: Performed by: STUDENT IN AN ORGANIZED HEALTH CARE EDUCATION/TRAINING PROGRAM

## 2022-12-17 PROCEDURE — 84100 ASSAY OF PHOSPHORUS: CPT

## 2022-12-17 PROCEDURE — 74011000258 HC RX REV CODE- 258: Performed by: STUDENT IN AN ORGANIZED HEALTH CARE EDUCATION/TRAINING PROGRAM

## 2022-12-17 PROCEDURE — 74011250637 HC RX REV CODE- 250/637: Performed by: COLON & RECTAL SURGERY

## 2022-12-17 PROCEDURE — 85025 COMPLETE CBC W/AUTO DIFF WBC: CPT

## 2022-12-17 PROCEDURE — 82962 GLUCOSE BLOOD TEST: CPT

## 2022-12-17 PROCEDURE — 74011250636 HC RX REV CODE- 250/636: Performed by: COLON & RECTAL SURGERY

## 2022-12-17 PROCEDURE — 74011250637 HC RX REV CODE- 250/637: Performed by: NURSE PRACTITIONER

## 2022-12-17 PROCEDURE — 74011000258 HC RX REV CODE- 258: Performed by: COLON & RECTAL SURGERY

## 2022-12-17 PROCEDURE — 74011250636 HC RX REV CODE- 250/636: Performed by: STUDENT IN AN ORGANIZED HEALTH CARE EDUCATION/TRAINING PROGRAM

## 2022-12-17 PROCEDURE — 83735 ASSAY OF MAGNESIUM: CPT

## 2022-12-17 PROCEDURE — 80053 COMPREHEN METABOLIC PANEL: CPT

## 2022-12-17 PROCEDURE — 65270000029 HC RM PRIVATE

## 2022-12-17 PROCEDURE — 74011000250 HC RX REV CODE- 250: Performed by: COLON & RECTAL SURGERY

## 2022-12-17 RX ADMIN — SODIUM CHLORIDE, PRESERVATIVE FREE 10 ML: 5 INJECTION INTRAVENOUS at 20:56

## 2022-12-17 RX ADMIN — PIPERACILLIN AND TAZOBACTAM 3.38 G: 3; .375 INJECTION, POWDER, LYOPHILIZED, FOR SOLUTION INTRAVENOUS at 12:15

## 2022-12-17 RX ADMIN — MORPHINE SULFATE 2 MG: 2 INJECTION, SOLUTION INTRAMUSCULAR; INTRAVENOUS at 02:06

## 2022-12-17 RX ADMIN — PIPERACILLIN AND TAZOBACTAM 3.38 G: 3; .375 INJECTION, POWDER, LYOPHILIZED, FOR SOLUTION INTRAVENOUS at 20:55

## 2022-12-17 RX ADMIN — ACETAMINOPHEN 650 MG: 325 TABLET ORAL at 13:04

## 2022-12-17 RX ADMIN — ACETAMINOPHEN 650 MG: 325 TABLET ORAL at 20:55

## 2022-12-17 RX ADMIN — MORPHINE SULFATE 2 MG: 2 INJECTION, SOLUTION INTRAMUSCULAR; INTRAVENOUS at 22:27

## 2022-12-17 RX ADMIN — PIPERACILLIN AND TAZOBACTAM 3.38 G: 3; .375 INJECTION, POWDER, LYOPHILIZED, FOR SOLUTION INTRAVENOUS at 04:41

## 2022-12-17 RX ADMIN — Medication 1 CAPSULE: at 11:54

## 2022-12-17 RX ADMIN — ENOXAPARIN SODIUM 40 MG: 100 INJECTION SUBCUTANEOUS at 11:54

## 2022-12-17 RX ADMIN — CALCIUM GLUCONATE: 94 INJECTION, SOLUTION INTRAVENOUS at 19:01

## 2022-12-17 NOTE — PROGRESS NOTES
CRS Note    Overall ok. Abd \"sore\" today. Ready for Weds. Abd soft, nondistended, minimally tender LLQ    Labs reviewed - WBC 11    A/P:  57M with diverticulitis not improving on IV abx.   - will plan for OR for lap possible open sigmoid colectomy Weds, Dec 21. Urology to place preop ureteral stents.   - plan for ostomy, will ask ostomy team to see and sujatha before surgery  - PICC and TPN in interim, appreciate RD input  - discussed above with pt who understands and is in agreement    Moses Guzman MD   [facility]  [phone]

## 2022-12-18 LAB
ANION GAP SERPL CALC-SCNC: 5 MMOL/L (ref 5–15)
BUN SERPL-MCNC: 11 MG/DL (ref 6–20)
BUN/CREAT SERPL: 12 (ref 12–20)
CALCIUM SERPL-MCNC: 8.4 MG/DL (ref 8.5–10.1)
CHLORIDE SERPL-SCNC: 105 MMOL/L (ref 97–108)
CO2 SERPL-SCNC: 25 MMOL/L (ref 21–32)
CREAT SERPL-MCNC: 0.89 MG/DL (ref 0.7–1.3)
GLUCOSE BLD STRIP.AUTO-MCNC: 207 MG/DL (ref 65–117)
GLUCOSE SERPL-MCNC: 186 MG/DL (ref 65–100)
MAGNESIUM SERPL-MCNC: 2.5 MG/DL (ref 1.6–2.4)
PHOSPHATE SERPL-MCNC: 3 MG/DL (ref 2.6–4.7)
POTASSIUM SERPL-SCNC: 4.1 MMOL/L (ref 3.5–5.1)
SERVICE CMNT-IMP: ABNORMAL
SODIUM SERPL-SCNC: 135 MMOL/L (ref 136–145)

## 2022-12-18 PROCEDURE — 80048 BASIC METABOLIC PNL TOTAL CA: CPT

## 2022-12-18 PROCEDURE — 82962 GLUCOSE BLOOD TEST: CPT

## 2022-12-18 PROCEDURE — 65270000029 HC RM PRIVATE

## 2022-12-18 PROCEDURE — 74011000258 HC RX REV CODE- 258: Performed by: COLON & RECTAL SURGERY

## 2022-12-18 PROCEDURE — 74011000258 HC RX REV CODE- 258: Performed by: STUDENT IN AN ORGANIZED HEALTH CARE EDUCATION/TRAINING PROGRAM

## 2022-12-18 PROCEDURE — 74011250636 HC RX REV CODE- 250/636: Performed by: STUDENT IN AN ORGANIZED HEALTH CARE EDUCATION/TRAINING PROGRAM

## 2022-12-18 PROCEDURE — 84100 ASSAY OF PHOSPHORUS: CPT

## 2022-12-18 PROCEDURE — 74011250636 HC RX REV CODE- 250/636: Performed by: COLON & RECTAL SURGERY

## 2022-12-18 PROCEDURE — 74011000250 HC RX REV CODE- 250: Performed by: COLON & RECTAL SURGERY

## 2022-12-18 PROCEDURE — 83735 ASSAY OF MAGNESIUM: CPT

## 2022-12-18 PROCEDURE — 74011000250 HC RX REV CODE- 250: Performed by: STUDENT IN AN ORGANIZED HEALTH CARE EDUCATION/TRAINING PROGRAM

## 2022-12-18 PROCEDURE — 36415 COLL VENOUS BLD VENIPUNCTURE: CPT

## 2022-12-18 PROCEDURE — 74011250637 HC RX REV CODE- 250/637: Performed by: NURSE PRACTITIONER

## 2022-12-18 PROCEDURE — 74011250637 HC RX REV CODE- 250/637: Performed by: COLON & RECTAL SURGERY

## 2022-12-18 RX ORDER — SODIUM CHLORIDE, SODIUM LACTATE, POTASSIUM CHLORIDE, CALCIUM CHLORIDE 600; 310; 30; 20 MG/100ML; MG/100ML; MG/100ML; MG/100ML
50 INJECTION, SOLUTION INTRAVENOUS CONTINUOUS
Status: DISCONTINUED | OUTPATIENT
Start: 2022-12-18 | End: 2022-12-22

## 2022-12-18 RX ADMIN — PIPERACILLIN AND TAZOBACTAM 3.38 G: 3; .375 INJECTION, POWDER, LYOPHILIZED, FOR SOLUTION INTRAVENOUS at 04:26

## 2022-12-18 RX ADMIN — ENOXAPARIN SODIUM 40 MG: 100 INJECTION SUBCUTANEOUS at 09:47

## 2022-12-18 RX ADMIN — ACETAMINOPHEN 650 MG: 325 TABLET ORAL at 09:47

## 2022-12-18 RX ADMIN — SODIUM CHLORIDE, POTASSIUM CHLORIDE, SODIUM LACTATE AND CALCIUM CHLORIDE 50 ML/HR: 600; 310; 30; 20 INJECTION, SOLUTION INTRAVENOUS at 12:42

## 2022-12-18 RX ADMIN — MORPHINE SULFATE 2 MG: 2 INJECTION, SOLUTION INTRAMUSCULAR; INTRAVENOUS at 04:25

## 2022-12-18 RX ADMIN — ACETAMINOPHEN 650 MG: 325 TABLET ORAL at 20:58

## 2022-12-18 RX ADMIN — PIPERACILLIN AND TAZOBACTAM 3.38 G: 3; .375 INJECTION, POWDER, LYOPHILIZED, FOR SOLUTION INTRAVENOUS at 12:42

## 2022-12-18 RX ADMIN — Medication 1 CAPSULE: at 09:47

## 2022-12-18 RX ADMIN — CALCIUM GLUCONATE: 94 INJECTION, SOLUTION INTRAVENOUS at 19:11

## 2022-12-18 RX ADMIN — PIPERACILLIN AND TAZOBACTAM 3.38 G: 3; .375 INJECTION, POWDER, LYOPHILIZED, FOR SOLUTION INTRAVENOUS at 20:54

## 2022-12-18 RX ADMIN — ACETAMINOPHEN 650 MG: 325 TABLET ORAL at 02:40

## 2022-12-18 RX ADMIN — SODIUM CHLORIDE, PRESERVATIVE FREE 10 ML: 5 INJECTION INTRAVENOUS at 20:54

## 2022-12-18 RX ADMIN — MORPHINE SULFATE 2 MG: 2 INJECTION, SOLUTION INTRAMUSCULAR; INTRAVENOUS at 21:00

## 2022-12-18 NOTE — PROGRESS NOTES
CRS Note    Ongoing fevers. Feels dehydrated. Abd soft, nondistended, minimally tender LLQ    Labs reviewed - no CBC today, otherwise ok    A/P:  57M with diverticulitis not improving on IV abx.   - up IVF  - will plan for OR for lap possible open sigmoid colectomy Weds, Dec 21. Urology to place preop ureteral stents.   - plan for ostomy, will ask ostomy team to see and sujatha before surgery  - PICC and TPN in interim, appreciate RD input  - discussed above with pt who understands and is in agreement    Sukumar East MD   [facility]  [phone]

## 2022-12-19 LAB
ANION GAP SERPL CALC-SCNC: 3 MMOL/L (ref 5–15)
BACTERIA SPEC CULT: NORMAL
BASOPHILS # BLD: 0 K/UL (ref 0–0.1)
BASOPHILS NFR BLD: 0 % (ref 0–1)
BUN SERPL-MCNC: 10 MG/DL (ref 6–20)
BUN/CREAT SERPL: 11 (ref 12–20)
CALCIUM SERPL-MCNC: 8.3 MG/DL (ref 8.5–10.1)
CHLORIDE SERPL-SCNC: 105 MMOL/L (ref 97–108)
CO2 SERPL-SCNC: 26 MMOL/L (ref 21–32)
CREAT SERPL-MCNC: 0.87 MG/DL (ref 0.7–1.3)
DIFFERENTIAL METHOD BLD: ABNORMAL
EOSINOPHIL # BLD: 0 K/UL (ref 0–0.4)
EOSINOPHIL NFR BLD: 0 % (ref 0–7)
ERYTHROCYTE [DISTWIDTH] IN BLOOD BY AUTOMATED COUNT: 12.3 % (ref 11.5–14.5)
GLUCOSE SERPL-MCNC: 178 MG/DL (ref 65–100)
HCT VFR BLD AUTO: 30 % (ref 36.6–50.3)
HGB BLD-MCNC: 9.6 G/DL (ref 12.1–17)
IMM GRANULOCYTES # BLD AUTO: 0.1 K/UL (ref 0–0.04)
IMM GRANULOCYTES NFR BLD AUTO: 1 % (ref 0–0.5)
LYMPHOCYTES # BLD: 1.2 K/UL (ref 0.8–3.5)
LYMPHOCYTES NFR BLD: 8 % (ref 12–49)
MAGNESIUM SERPL-MCNC: 2.4 MG/DL (ref 1.6–2.4)
MCH RBC QN AUTO: 31.2 PG (ref 26–34)
MCHC RBC AUTO-ENTMCNC: 32 G/DL (ref 30–36.5)
MCV RBC AUTO: 97.4 FL (ref 80–99)
MONOCYTES # BLD: 1.6 K/UL (ref 0–1)
MONOCYTES NFR BLD: 11 % (ref 5–13)
NEUTS SEG # BLD: 11.9 K/UL (ref 1.8–8)
NEUTS SEG NFR BLD: 80 % (ref 32–75)
NRBC # BLD: 0 K/UL (ref 0–0.01)
NRBC BLD-RTO: 0 PER 100 WBC
PHOSPHATE SERPL-MCNC: 3.3 MG/DL (ref 2.6–4.7)
PLATELET # BLD AUTO: 306 K/UL (ref 150–400)
PMV BLD AUTO: 11.1 FL (ref 8.9–12.9)
POTASSIUM SERPL-SCNC: 3.9 MMOL/L (ref 3.5–5.1)
RBC # BLD AUTO: 3.08 M/UL (ref 4.1–5.7)
SERVICE CMNT-IMP: NORMAL
SODIUM SERPL-SCNC: 134 MMOL/L (ref 136–145)
WBC # BLD AUTO: 14.8 K/UL (ref 4.1–11.1)

## 2022-12-19 PROCEDURE — 74011000258 HC RX REV CODE- 258: Performed by: NURSE PRACTITIONER

## 2022-12-19 PROCEDURE — 74011000250 HC RX REV CODE- 250: Performed by: COLON & RECTAL SURGERY

## 2022-12-19 PROCEDURE — 74011250637 HC RX REV CODE- 250/637: Performed by: NURSE PRACTITIONER

## 2022-12-19 PROCEDURE — 83735 ASSAY OF MAGNESIUM: CPT

## 2022-12-19 PROCEDURE — 74011000250 HC RX REV CODE- 250: Performed by: STUDENT IN AN ORGANIZED HEALTH CARE EDUCATION/TRAINING PROGRAM

## 2022-12-19 PROCEDURE — 74011000258 HC RX REV CODE- 258: Performed by: COLON & RECTAL SURGERY

## 2022-12-19 PROCEDURE — 80048 BASIC METABOLIC PNL TOTAL CA: CPT

## 2022-12-19 PROCEDURE — 74011250636 HC RX REV CODE- 250/636: Performed by: STUDENT IN AN ORGANIZED HEALTH CARE EDUCATION/TRAINING PROGRAM

## 2022-12-19 PROCEDURE — 84100 ASSAY OF PHOSPHORUS: CPT

## 2022-12-19 PROCEDURE — 74011250636 HC RX REV CODE- 250/636: Performed by: NURSE PRACTITIONER

## 2022-12-19 PROCEDURE — 74011000258 HC RX REV CODE- 258: Performed by: STUDENT IN AN ORGANIZED HEALTH CARE EDUCATION/TRAINING PROGRAM

## 2022-12-19 PROCEDURE — 74011250636 HC RX REV CODE- 250/636: Performed by: COLON & RECTAL SURGERY

## 2022-12-19 PROCEDURE — 36415 COLL VENOUS BLD VENIPUNCTURE: CPT

## 2022-12-19 PROCEDURE — 74011250637 HC RX REV CODE- 250/637: Performed by: COLON & RECTAL SURGERY

## 2022-12-19 PROCEDURE — 94760 N-INVAS EAR/PLS OXIMETRY 1: CPT

## 2022-12-19 PROCEDURE — 65270000029 HC RM PRIVATE

## 2022-12-19 PROCEDURE — 85025 COMPLETE CBC W/AUTO DIFF WBC: CPT

## 2022-12-19 RX ORDER — NEOMYCIN SULFATE 500 MG/1
1000 TABLET ORAL ONCE
Status: COMPLETED | OUTPATIENT
Start: 2022-12-20 | End: 2022-12-20

## 2022-12-19 RX ORDER — METRONIDAZOLE 250 MG/1
500 TABLET ORAL ONCE
Status: COMPLETED | OUTPATIENT
Start: 2022-12-20 | End: 2022-12-20

## 2022-12-19 RX ORDER — METOCLOPRAMIDE 10 MG/1
10 TABLET ORAL ONCE
Status: COMPLETED | OUTPATIENT
Start: 2022-12-20 | End: 2022-12-20

## 2022-12-19 RX ADMIN — Medication 1 CAPSULE: at 09:03

## 2022-12-19 RX ADMIN — MORPHINE SULFATE 2 MG: 2 INJECTION, SOLUTION INTRAMUSCULAR; INTRAVENOUS at 05:50

## 2022-12-19 RX ADMIN — SODIUM CHLORIDE 200 MG: 9 INJECTION, SOLUTION INTRAVENOUS at 10:47

## 2022-12-19 RX ADMIN — PIPERACILLIN AND TAZOBACTAM 3.38 G: 3; .375 INJECTION, POWDER, LYOPHILIZED, FOR SOLUTION INTRAVENOUS at 04:33

## 2022-12-19 RX ADMIN — PIPERACILLIN AND TAZOBACTAM 3.38 G: 3; .375 INJECTION, POWDER, LYOPHILIZED, FOR SOLUTION INTRAVENOUS at 15:03

## 2022-12-19 RX ADMIN — ACETAMINOPHEN 650 MG: 325 TABLET ORAL at 16:55

## 2022-12-19 RX ADMIN — ACETAMINOPHEN 650 MG: 325 TABLET ORAL at 09:11

## 2022-12-19 RX ADMIN — PIPERACILLIN AND TAZOBACTAM 3.38 G: 3; .375 INJECTION, POWDER, LYOPHILIZED, FOR SOLUTION INTRAVENOUS at 20:40

## 2022-12-19 RX ADMIN — MORPHINE SULFATE 2 MG: 2 INJECTION, SOLUTION INTRAMUSCULAR; INTRAVENOUS at 17:03

## 2022-12-19 RX ADMIN — SODIUM CHLORIDE, POTASSIUM CHLORIDE, SODIUM LACTATE AND CALCIUM CHLORIDE 50 ML/HR: 600; 310; 30; 20 INJECTION, SOLUTION INTRAVENOUS at 15:03

## 2022-12-19 RX ADMIN — ENOXAPARIN SODIUM 40 MG: 100 INJECTION SUBCUTANEOUS at 09:03

## 2022-12-19 RX ADMIN — MORPHINE SULFATE 2 MG: 2 INJECTION, SOLUTION INTRAMUSCULAR; INTRAVENOUS at 22:13

## 2022-12-19 RX ADMIN — CALCIUM GLUCONATE: 94 INJECTION, SOLUTION INTRAVENOUS at 19:03

## 2022-12-19 RX ADMIN — MORPHINE SULFATE 2 MG: 2 INJECTION, SOLUTION INTRAMUSCULAR; INTRAVENOUS at 02:35

## 2022-12-19 RX ADMIN — I.V. FAT EMULSION 500 ML: 20 EMULSION INTRAVENOUS at 19:04

## 2022-12-19 NOTE — PROGRESS NOTES
Physician Progress Note      Rhina Dejesus  CSN #:                  [id number]  :                       1965  ADMIT DATE:       2022 9:14 PM  [address] [place] DATE:  RESPONDING  PROVIDER #:        Lakeisha Gerard MD          QUERY TEXT:    Patient admitted with diverticulitis. If possible, please document in progress notes and discharge summary if you are evaluating and /or treating any of the following: The medical record reflects the following:  Risk Factors: malnutrition    Clinical Indicators:  nutritional note   Malnutrition Assessment:  Malnutrition Status: Moderate malnutrition (22 1417)  Context:  Acute illness  Findings of the 6 clinical characteristics of malnutrition:  Energy Intake:  75% or less of est energy req for 7 or more days  Weight Loss:  5% over one month  Body Fat Loss:  No significant body fat loss,  Muscle Mass Loss:  No significant muscle mass loss,  Fluid Accumulation:  No significant fluid accumulation,   Strength:  Not performed    Anthropometric Measures:  Height: 5' 9\" (175.3 cm)  Ideal Body Weight (IBW): 160 lbs (73 kg)  Current Body Wt:  85.1 kg (187 lb 9.8 oz), 117.3 % IBW. Not specified  Current BMI (kg/m2): 27.7  Weight Adjustment: No adjustment  BMI Category: Overweight (BMI 25.0-29. 9)    Treatment:    Nutrition Recommendations/Plan:    1. Continue TPN as ordered:  D20, 5% AA at 75 ml/hr + 500 ml 20% lipids 3x/week    2. Can D/C IVF with TPN at goal rate    3. Continue Full liquid diet    4. Continue Ensure surgery BID      Thank you,  Colette Sorto RN [facility]  Clinical Documentation  [phone] or via [employer]    ASPEN Criteria:  https://aspenjournals. onlinelibrary. mayer. com/doi/full/10.1177/6994772061421655  Options provided:  -- Protein calorie malnutrition moderate  -- Protein calorie malnutrition severe  -- Other - I will add my own diagnosis  -- Disagree - Not applicable / Not valid  -- Disagree - Clinically unable to determine / Unknown  -- Refer to Clinical Documentation Reviewer    PROVIDER RESPONSE TEXT:    This patient has moderate protein calorie malnutrition.     Query created by: Alex Hatch on 12/19/2022 8:54 AM      Electronically signed by:  Kristy Quarles MD 12/19/2022 10:10 AM

## 2022-12-19 NOTE — PROGRESS NOTES
CRS Note    Feeling better today but still with fevers and WBC up. Abd soft, nondistended, minimally tender LLQ    Labs reviewed    A/P:  57M with diverticulitis not improving on IV abx.   - cont IVF  - OR for lap possible open sigmoid colectomy Weds, Dec 21, 3pm. Urology to place preop ureteral stents.   - plan for ostomy, will ask ostomy team to see and sujatha before surgery  - PICC and TPN in interim, appreciate RD input  - discussed above with pt who understands and is in agreement    Sukumar East MD   [facility]  [phone]

## 2022-12-19 NOTE — PROGRESS NOTES
ID Progress Note  2022    Subjective: Worsening of lower abd pain    Review of Systems:            Symptom Y/N Comments   Symptom Y/N Comments   Fever/Chills n      Chest Pain n       Poor Appetite       Edema        Cough       Abdominal Pain y      Sputum       Joint Pain        SOB/FISH  n     Pruritis/Rash        Nausea/vomit  n     Tolerating PT/OT        Diarrhea  y  same as POA   Tolerating Diet        Constipation       Other           Could NOT obtain due to:       Objective:     Vitals: Visit Vitals  /63   Pulse 94   Temp (!) 101 °F (38.3 °C)   Resp 18   Ht 5' 9\" (1.753 m)   Wt 86.2 kg (190 lb)   SpO2 94%   BMI 28.06 kg/m²          Tmax:  Temp (24hrs), Av.7 °F (37.6 °C), Min:98.5 °F (36.9 °C), Max:101 °F (38.3 °C)      PHYSICAL EXAM:  General: WD, WN. Alert, cooperative, no acute distress    EENT:  EOMI. Anicteric sclerae. MMM  Resp:  CTA bilaterally, no wheezing or rales. No accessory muscle use  CV:  Regular  rhythm,  No edema  GI:  Soft, Non distended, lower abd tender; L>R  +Bowel sounds  Neurologic:  Alert and oriented X 3, normal speech,   Psych:   Good insight. Not anxious nor agitated  Skin:  No rashes. No jaundice    Labs:   Lab Results   Component Value Date/Time    WBC 14.8 (H) 2022 12:03 AM    HGB 9.6 (L) 2022 12:03 AM    HCT 30.0 (L) 2022 12:03 AM    PLATELET 311  12:03 AM    MCV 97.4 2022 12:03 AM     Lab Results   Component Value Date/Time    Sodium 134 (L) 2022 12:03 AM    Potassium 3.9 2022 12:03 AM    Chloride 105 2022 12:03 AM    CO2 26 2022 12:03 AM    Anion gap 3 (L) 2022 12:03 AM    Glucose 178 (H) 2022 12:03 AM    BUN 10 2022 12:03 AM    Creatinine 0.87 2022 12:03 AM    BUN/Creatinine ratio 11 (L) 2022 12:03 AM    Calcium 8.3 (L) 2022 12:03 AM    Bilirubin, total 0.3 2022 12:52 AM    Alk.  phosphatase 139 (H) 2022 12:52 AM    Protein, total 7.4 2022 12:52 AM    Albumin 2.3 (L) 12/17/2022 12:52 AM    Globulin 5.1 (H) 12/17/2022 12:52 AM    A-G Ratio 0.5 (L) 12/17/2022 12:52 AM    ALT (SGPT) 22 12/17/2022 12:52 AM         Cultures:   Results       Procedure Component Value Units Date/Time    CULTURE, BLOOD, PAIRED [[id number]] Collected: 12/14/22 0901    Order Status: Completed Specimen: Blood Updated: 12/19/22 0602     Special Requests: NO SPECIAL REQUESTS        Culture result: NO GROWTH 5 DAYS       COVID-19 WITH INFLUENZA A/B [[id number]] Collected: 12/09/22 1030    Order Status: Completed Specimen: Nasopharyngeal Updated: 12/09/22 1330     SARS-CoV-2 by PCR Not detected        Comment: Not Detected results do not preclude SARS-CoV-2 infection and should not be used as the sole basis for patient management decisions. Results must be combined with clinical observations, patient history, and epidemiological information. Influenza A by PCR Not detected        Influenza B by PCR Not detected        Comment: Testing was performed using arsenio Nora SARS-CoV-2 and Influenza A/B nucleic acid assay. This test is a multiplex Real-Time Reverse Transcriptase Polymerase Chain Reaction (RT-PCR) based in vitro diagnostic test intended for the qualitative detection of nucleic acids from SARS-CoV-2, Influenza A, and Influenza B in nasopharyngeal and nasal swab specimens for use under the FDA's Emergency Use Authorization (EUA) only.        Fact sheet for Patients: FindDrives.pl  Fact sheet for Healthcare Providers: FindDrives.pl         CULTURE, BLOOD, PAIRED [[id number]] Collected: 12/08/22 1445    Order Status: Completed Specimen: Blood Updated: 12/14/22 0633     Special Requests: NO SPECIAL REQUESTS        Culture result: NO GROWTH 6 DAYS                 Impression:   Diverticulitis  Fever  - T-max 101 wbc 14.8    Blood cx (12/8) no growth (12/14) no growth so far    CT of abd/pel (12/14) Persistent acute sigmoid colon diverticulitis with contained perforation. Mass like inflammatory changes and associated locules of gas and tiny pockets of fluid adjacent to the sigmoid colon, abutting the left iliopsoas muscle, are not significantly changed. No drainable collection is evident.      Tobacco abuse  - nicotine patchy ordered  Plan:    Continue with IV zosyn, add IV Eraxis (fever + WBC trending up)   Continue with Probiotic therapy   PICC line placed on 12/15 - TPN in progress   Adjust abx prn   CRS following; possible surgical intervention on 12/21   Fever work up if temp >= 100.4 every 24 hours   Recommend influenza vaccine prior to discharge; pt agreed    Above plan of care discussed and agreed with Dr. Claudene Collar, NP

## 2022-12-19 NOTE — WOUND CARE
Stoma Marking Note:     Type of ostomy scheduled: possible loop ileostomy or possible colostomy with sigmoid colectomy by Dr. Matthias Mckee 12/21/22. History of diverticulitis that has not responded to medical treatment. Introduced self and services to patient. Patient able to verbalize knowledge of procedure consistent with consent. Stoma site marked with surgical marker in LLQ & RLQ. Stoma site chosen is in the patients visual field and within the rectus muscle while avoiding the following: umbilicus, wrinkles, dips, skin folds, rib cage, iliac crest and belt/pants/underwear line. Site was assessed while lying & sitting. Abdominal topography unremarkable; lower quads do roll under when he is sitting and therefore marked a little higher. Patient understands that the final location will be determined by the surgeon and the site is only a guideline. Pt reports his brother had a temporary ostomy. He declined written material at this time. I will return tomorrow to attempt preoperative teaching. Plan to follow after surgery for ostomy education as needed. Will likely need home health care for further teaching after discharge.     Aziza Antonio, RN, BSN, [facility]  Certified Wound, Ostomy, Continence Nurse  office: [phone]  Available via [facility]

## 2022-12-20 ENCOUNTER — ANESTHESIA EVENT (OUTPATIENT)
Dept: SURGERY | Age: 57
DRG: 330 | End: 2022-12-20
Payer: COMMERCIAL

## 2022-12-20 LAB
ANION GAP SERPL CALC-SCNC: 9 MMOL/L (ref 5–15)
BASOPHILS # BLD: 0 K/UL (ref 0–0.1)
BASOPHILS NFR BLD: 0 % (ref 0–1)
BUN SERPL-MCNC: 8 MG/DL (ref 6–20)
BUN/CREAT SERPL: 10 (ref 12–20)
CALCIUM SERPL-MCNC: 8.1 MG/DL (ref 8.5–10.1)
CHLORIDE SERPL-SCNC: 102 MMOL/L (ref 97–108)
CO2 SERPL-SCNC: 22 MMOL/L (ref 21–32)
CREAT SERPL-MCNC: 0.8 MG/DL (ref 0.7–1.3)
DIFFERENTIAL METHOD BLD: ABNORMAL
EOSINOPHIL # BLD: 0.1 K/UL (ref 0–0.4)
EOSINOPHIL NFR BLD: 1 % (ref 0–7)
ERYTHROCYTE [DISTWIDTH] IN BLOOD BY AUTOMATED COUNT: 12.3 % (ref 11.5–14.5)
GLUCOSE BLD STRIP.AUTO-MCNC: 169 MG/DL (ref 65–117)
GLUCOSE BLD STRIP.AUTO-MCNC: 182 MG/DL (ref 65–117)
GLUCOSE BLD STRIP.AUTO-MCNC: 204 MG/DL (ref 65–117)
GLUCOSE BLD STRIP.AUTO-MCNC: 221 MG/DL (ref 65–117)
GLUCOSE SERPL-MCNC: 212 MG/DL (ref 65–100)
HCT VFR BLD AUTO: 29.4 % (ref 36.6–50.3)
HGB BLD-MCNC: 9.4 G/DL (ref 12.1–17)
IMM GRANULOCYTES # BLD AUTO: 0.1 K/UL (ref 0–0.04)
IMM GRANULOCYTES NFR BLD AUTO: 1 % (ref 0–0.5)
LYMPHOCYTES # BLD: 1 K/UL (ref 0.8–3.5)
LYMPHOCYTES NFR BLD: 6 % (ref 12–49)
MCH RBC QN AUTO: 31.2 PG (ref 26–34)
MCHC RBC AUTO-ENTMCNC: 32 G/DL (ref 30–36.5)
MCV RBC AUTO: 97.7 FL (ref 80–99)
MONOCYTES # BLD: 1.2 K/UL (ref 0–1)
MONOCYTES NFR BLD: 8 % (ref 5–13)
NEUTS SEG # BLD: 13.1 K/UL (ref 1.8–8)
NEUTS SEG NFR BLD: 84 % (ref 32–75)
NRBC # BLD: 0 K/UL (ref 0–0.01)
NRBC BLD-RTO: 0 PER 100 WBC
PLATELET # BLD AUTO: 287 K/UL (ref 150–400)
PMV BLD AUTO: 11.1 FL (ref 8.9–12.9)
POTASSIUM SERPL-SCNC: 3.9 MMOL/L (ref 3.5–5.1)
RBC # BLD AUTO: 3.01 M/UL (ref 4.1–5.7)
SERVICE CMNT-IMP: ABNORMAL
SODIUM SERPL-SCNC: 133 MMOL/L (ref 136–145)
WBC # BLD AUTO: 15.4 K/UL (ref 4.1–11.1)

## 2022-12-20 PROCEDURE — 65270000029 HC RM PRIVATE

## 2022-12-20 PROCEDURE — 74011250637 HC RX REV CODE- 250/637: Performed by: COLON & RECTAL SURGERY

## 2022-12-20 PROCEDURE — 74011250637 HC RX REV CODE- 250/637: Performed by: STUDENT IN AN ORGANIZED HEALTH CARE EDUCATION/TRAINING PROGRAM

## 2022-12-20 PROCEDURE — 80048 BASIC METABOLIC PNL TOTAL CA: CPT

## 2022-12-20 PROCEDURE — 36415 COLL VENOUS BLD VENIPUNCTURE: CPT

## 2022-12-20 PROCEDURE — 82962 GLUCOSE BLOOD TEST: CPT

## 2022-12-20 PROCEDURE — 74011250636 HC RX REV CODE- 250/636: Performed by: COLON & RECTAL SURGERY

## 2022-12-20 PROCEDURE — 74011250636 HC RX REV CODE- 250/636: Performed by: STUDENT IN AN ORGANIZED HEALTH CARE EDUCATION/TRAINING PROGRAM

## 2022-12-20 PROCEDURE — 74011000250 HC RX REV CODE- 250: Performed by: STUDENT IN AN ORGANIZED HEALTH CARE EDUCATION/TRAINING PROGRAM

## 2022-12-20 PROCEDURE — 74011000258 HC RX REV CODE- 258: Performed by: NURSE PRACTITIONER

## 2022-12-20 PROCEDURE — 85025 COMPLETE CBC W/AUTO DIFF WBC: CPT

## 2022-12-20 PROCEDURE — 74011000258 HC RX REV CODE- 258: Performed by: COLON & RECTAL SURGERY

## 2022-12-20 PROCEDURE — 74011250637 HC RX REV CODE- 250/637: Performed by: NURSE PRACTITIONER

## 2022-12-20 PROCEDURE — 74011000258 HC RX REV CODE- 258: Performed by: STUDENT IN AN ORGANIZED HEALTH CARE EDUCATION/TRAINING PROGRAM

## 2022-12-20 PROCEDURE — 74011250636 HC RX REV CODE- 250/636: Performed by: NURSE PRACTITIONER

## 2022-12-20 RX ADMIN — METRONIDAZOLE 500 MG: 250 TABLET ORAL at 16:59

## 2022-12-20 RX ADMIN — NEOMYCIN SULFATE 1000 MG: 500 TABLET ORAL at 09:49

## 2022-12-20 RX ADMIN — METRONIDAZOLE 500 MG: 250 TABLET ORAL at 09:49

## 2022-12-20 RX ADMIN — MORPHINE SULFATE 2 MG: 2 INJECTION, SOLUTION INTRAMUSCULAR; INTRAVENOUS at 21:49

## 2022-12-20 RX ADMIN — NEOMYCIN SULFATE 1000 MG: 500 TABLET ORAL at 07:17

## 2022-12-20 RX ADMIN — Medication 1 CAPSULE: at 09:49

## 2022-12-20 RX ADMIN — ENOXAPARIN SODIUM 40 MG: 100 INJECTION SUBCUTANEOUS at 09:49

## 2022-12-20 RX ADMIN — METRONIDAZOLE 500 MG: 250 TABLET ORAL at 07:17

## 2022-12-20 RX ADMIN — PIPERACILLIN AND TAZOBACTAM 3.38 G: 3; .375 INJECTION, POWDER, LYOPHILIZED, FOR SOLUTION INTRAVENOUS at 12:21

## 2022-12-20 RX ADMIN — NEOMYCIN SULFATE 1000 MG: 500 TABLET ORAL at 16:58

## 2022-12-20 RX ADMIN — POLYETHYLENE GLYCOL 3350, SODIUM SULFATE ANHYDROUS, SODIUM BICARBONATE, SODIUM CHLORIDE, POTASSIUM CHLORIDE 4000 ML: 236; 22.74; 6.74; 5.86; 2.97 POWDER, FOR SOLUTION ORAL at 12:21

## 2022-12-20 RX ADMIN — ACETAMINOPHEN 650 MG: 325 TABLET ORAL at 16:59

## 2022-12-20 RX ADMIN — METOCLOPRAMIDE 10 MG: 10 TABLET ORAL at 12:22

## 2022-12-20 RX ADMIN — METOCLOPRAMIDE 10 MG: 10 TABLET ORAL at 16:59

## 2022-12-20 RX ADMIN — SODIUM CHLORIDE 100 MG: 9 INJECTION, SOLUTION INTRAVENOUS at 09:50

## 2022-12-20 RX ADMIN — MORPHINE SULFATE 2 MG: 2 INJECTION, SOLUTION INTRAMUSCULAR; INTRAVENOUS at 16:59

## 2022-12-20 RX ADMIN — METOCLOPRAMIDE 10 MG: 10 TABLET ORAL at 06:28

## 2022-12-20 RX ADMIN — PIPERACILLIN AND TAZOBACTAM 3.38 G: 3; .375 INJECTION, POWDER, LYOPHILIZED, FOR SOLUTION INTRAVENOUS at 20:33

## 2022-12-20 RX ADMIN — CALCIUM GLUCONATE: 94 INJECTION, SOLUTION INTRAVENOUS at 18:59

## 2022-12-20 RX ADMIN — ACETAMINOPHEN 650 MG: 325 TABLET ORAL at 21:49

## 2022-12-20 RX ADMIN — ACETAMINOPHEN 650 MG: 325 TABLET ORAL at 00:48

## 2022-12-20 RX ADMIN — PIPERACILLIN AND TAZOBACTAM 3.38 G: 3; .375 INJECTION, POWDER, LYOPHILIZED, FOR SOLUTION INTRAVENOUS at 06:28

## 2022-12-20 RX ADMIN — MORPHINE SULFATE 2 MG: 2 INJECTION, SOLUTION INTRAMUSCULAR; INTRAVENOUS at 01:25

## 2022-12-20 NOTE — WOUND CARE
CWOCN Ostomy Progress Note:     Ostomy scheduled: possible loop ileostomy or possible colostomy with sigmoid colectomy by Dr. Chanelle Mabry 12/21/22. Teaching/Subjects covered today:  Encouraged pt to review handout given to patient/family and ask questions regarding material on next ostomy nurse visit. - General anatomy and expectations of output  - Normal & abnormal stoma characteristics  - Normal & abnormal peristomal characteristics   - Normal output from distal limb of colon  - When/how to clean stoma & peristomal skin  - When to empty pouch  - When to change appliance  - Reviewed ADL's (bathing/filter, car pillow to protect from seatbelt, clothing, etc)  - Emergency kit    His wife is an RN with home health and hospice experience. She will be a valuable resource for him. She was present today. Transition of Care:  Ostomy nurse to follow after surgery 12/21 for ostomy education.      Sri Crane, RN, BSN, [facility] [place]  Certified Wound, Ostomy, Continence Nurse  office: [phone]  Available via [employer]

## 2022-12-20 NOTE — PROGRESS NOTES
CRS Note    No real changes. Feels \"sore\"    Vitals reviewed - ongoing fevesr    Abd soft, nondistended, minimally tender LLQ    Labs reviewed, increasing leukocytosis    A/P:  57M with diverticulitis not improving on IV abx.   - cont IVF  - mechanical and abx bowel prep today  - CLD today, NPO mn  - OR for lap possible open sigmoid colectomy Weds, Dec 21, 3pm. Urology to place preop ureteral stents.   - has been seen and marked by ostomy team, appreciate their help  - PICC and TPN in interim, appreciate RD input  - discussed above with pt who understands and is in agreement    Tor Brito MD   [facility]  [phone]

## 2022-12-20 NOTE — PROGRESS NOTES
ID Progress Note  2022    Subjective:     Feeling thirsty at times    Review of Systems:            Symptom Y/N Comments   Symptom Y/N Comments   Fever/Chills n      Chest Pain n       Poor Appetite       Edema        Cough       Abdominal Pain y      Sputum       Joint Pain        SOB/FISH  n     Pruritis/Rash        Nausea/vomit  n     Tolerating PT/OT        Diarrhea  y  same as POA   Tolerating Diet        Constipation       Other           Could NOT obtain due to:       Objective:     Vitals: Visit Vitals  /65 (BP 1 Location: Left upper arm, BP Patient Position: At rest;Lying right side)   Pulse 96   Temp 99.1 °F (37.3 °C)   Resp 24   Ht 5' 9\" (1.753 m)   Wt 86.2 kg (190 lb)   SpO2 94%   BMI 28.06 kg/m²          Tmax:  Temp (24hrs), Av.7 °F (37.6 °C), Min:98.8 °F (37.1 °C), Max:100.8 °F (38.2 °C)      PHYSICAL EXAM:  General: WD, WN. Alert, cooperative, no acute distress    EENT:  EOMI. Anicteric sclerae. MMM  Resp:  CTA bilaterally, no wheezing or rales. No accessory muscle use  CV:  Regular  rhythm,  No edema  GI:  Soft, Non distended, lower abd tender; L>R  +Bowel sounds  Neurologic:  Alert and oriented X 3, normal speech,   Psych:   Good insight. Not anxious nor agitated  Skin:  No rashes. No jaundice    Labs:   Lab Results   Component Value Date/Time    WBC 15.4 (H) 2022 12:45 AM    HGB 9.4 (L) 2022 12:45 AM    HCT 29.4 (L) 2022 12:45 AM    PLATELET 131 15/29/4820 12:45 AM    MCV 97.7 2022 12:45 AM     Lab Results   Component Value Date/Time    Sodium 133 (L) 2022 12:45 AM    Potassium 3.9 2022 12:45 AM    Chloride 102 2022 12:45 AM    CO2 22 2022 12:45 AM    Anion gap 9 2022 12:45 AM    Glucose 212 (H) 2022 12:45 AM    BUN 8 2022 12:45 AM    Creatinine 0.80 2022 12:45 AM    BUN/Creatinine ratio 10 (L) 2022 12:45 AM    Calcium 8.1 (L) 2022 12:45 AM    Bilirubin, total 0.3 2022 12:52 AM    Alk. phosphatase 139 (H) 12/17/2022 12:52 AM    Protein, total 7.4 12/17/2022 12:52 AM    Albumin 2.3 (L) 12/17/2022 12:52 AM    Globulin 5.1 (H) 12/17/2022 12:52 AM    A-G Ratio 0.5 (L) 12/17/2022 12:52 AM    ALT (SGPT) 22 12/17/2022 12:52 AM         Cultures:   Results       Procedure Component Value Units Date/Time    CULTURE, BLOOD, PAIRED [[id number]] Collected: 12/14/22 0901    Order Status: Completed Specimen: Blood Updated: 12/19/22 2114     Special Requests: NO SPECIAL REQUESTS        Culture result: NO GROWTH 5 DAYS       COVID-19 WITH INFLUENZA A/B [[id number]] Collected: 12/09/22 1030    Order Status: Completed Specimen: Nasopharyngeal Updated: 12/09/22 1330     SARS-CoV-2 by PCR Not detected        Comment: Not Detected results do not preclude SARS-CoV-2 infection and should not be used as the sole basis for patient management decisions. Results must be combined with clinical observations, patient history, and epidemiological information. Influenza A by PCR Not detected        Influenza B by PCR Not detected        Comment: Testing was performed using arsenio Nora SARS-CoV-2 and Influenza A/B nucleic acid assay. This test is a multiplex Real-Time Reverse Transcriptase Polymerase Chain Reaction (RT-PCR) based in vitro diagnostic test intended for the qualitative detection of nucleic acids from SARS-CoV-2, Influenza A, and Influenza B in nasopharyngeal and nasal swab specimens for use under the FDA's Emergency Use Authorization (EUA) only.        Fact sheet for Patients: FindDrives.pl  Fact sheet for Healthcare Providers: FindDrives.pl         CULTURE, BLOOD, PAIRED [[id number]] Collected: 12/08/22 1445    Order Status: Completed Specimen: Blood Updated: 12/14/22 0633     Special Requests: NO SPECIAL REQUESTS        Culture result: NO GROWTH 6 DAYS                 Impression:   Diverticulitis  Fever  - T-max 100.8 wbc 14.8->15.4    Blood cx (12/8) no growth (12/14) no growth so far    CT of abd/pel (12/14) Persistent acute sigmoid colon diverticulitis with contained perforation. Mass like inflammatory changes and associated locules of gas and tiny pockets of fluid adjacent to the sigmoid colon, abutting the left iliopsoas muscle, are not significantly changed. No drainable collection is evident.      Tobacco abuse  - nicotine patchy ordered  Plan:    Continue with IV zosyn and IV Eraxis    Continue with Probiotic therapy   PICC line placed on 12/15 - TPN in progress   Adjust abx prn   CRS following; possible surgical intervention on 12/21   Fever work up if temp >= 100.4 every 24 hours   Recommend influenza vaccine prior to discharge; pt agreed    Above plan of care discussed and agreed with Dr. Basilio Mchugh, NP

## 2022-12-21 ENCOUNTER — ANESTHESIA (OUTPATIENT)
Dept: SURGERY | Age: 57
DRG: 330 | End: 2022-12-21
Payer: COMMERCIAL

## 2022-12-21 LAB
ABO + RH BLD: NORMAL
ANION GAP SERPL CALC-SCNC: 4 MMOL/L (ref 5–15)
BASOPHILS # BLD: 0 K/UL (ref 0–0.1)
BASOPHILS NFR BLD: 0 % (ref 0–1)
BLOOD GROUP ANTIBODIES SERPL: NORMAL
BUN SERPL-MCNC: 10 MG/DL (ref 6–20)
BUN/CREAT SERPL: 13 (ref 12–20)
CALCIUM SERPL-MCNC: 8.3 MG/DL (ref 8.5–10.1)
CHLORIDE SERPL-SCNC: 102 MMOL/L (ref 97–108)
CO2 SERPL-SCNC: 25 MMOL/L (ref 21–32)
CREAT SERPL-MCNC: 0.79 MG/DL (ref 0.7–1.3)
DIFFERENTIAL METHOD BLD: ABNORMAL
EOSINOPHIL # BLD: 0.1 K/UL (ref 0–0.4)
EOSINOPHIL NFR BLD: 1 % (ref 0–7)
ERYTHROCYTE [DISTWIDTH] IN BLOOD BY AUTOMATED COUNT: 12.5 % (ref 11.5–14.5)
GLUCOSE BLD STRIP.AUTO-MCNC: 194 MG/DL (ref 65–117)
GLUCOSE BLD STRIP.AUTO-MCNC: 265 MG/DL (ref 65–117)
GLUCOSE SERPL-MCNC: 163 MG/DL (ref 65–100)
HCT VFR BLD AUTO: 27.6 % (ref 36.6–50.3)
HGB BLD-MCNC: 9 G/DL (ref 12.1–17)
IMM GRANULOCYTES # BLD AUTO: 0.1 K/UL (ref 0–0.04)
IMM GRANULOCYTES NFR BLD AUTO: 1 % (ref 0–0.5)
LYMPHOCYTES # BLD: 0.9 K/UL (ref 0.8–3.5)
LYMPHOCYTES NFR BLD: 6 % (ref 12–49)
MCH RBC QN AUTO: 31.4 PG (ref 26–34)
MCHC RBC AUTO-ENTMCNC: 32.6 G/DL (ref 30–36.5)
MCV RBC AUTO: 96.2 FL (ref 80–99)
MONOCYTES # BLD: 1.3 K/UL (ref 0–1)
MONOCYTES NFR BLD: 9 % (ref 5–13)
NEUTS SEG # BLD: 12.1 K/UL (ref 1.8–8)
NEUTS SEG NFR BLD: 83 % (ref 32–75)
NRBC # BLD: 0 K/UL (ref 0–0.01)
NRBC BLD-RTO: 0 PER 100 WBC
PLATELET # BLD AUTO: 281 K/UL (ref 150–400)
PMV BLD AUTO: 11.4 FL (ref 8.9–12.9)
POTASSIUM SERPL-SCNC: 4 MMOL/L (ref 3.5–5.1)
RBC # BLD AUTO: 2.87 M/UL (ref 4.1–5.7)
SERVICE CMNT-IMP: ABNORMAL
SERVICE CMNT-IMP: ABNORMAL
SODIUM SERPL-SCNC: 131 MMOL/L (ref 136–145)
SPECIMEN EXP DATE BLD: NORMAL
WBC # BLD AUTO: 14.5 K/UL (ref 4.1–11.1)

## 2022-12-21 PROCEDURE — 77030008684 HC TU ET CUF COVD -B: Performed by: ANESTHESIOLOGY

## 2022-12-21 PROCEDURE — 76210000017 HC OR PH I REC 1.5 TO 2 HR: Performed by: STUDENT IN AN ORGANIZED HEALTH CARE EDUCATION/TRAINING PROGRAM

## 2022-12-21 PROCEDURE — 77030022473 HC HNDL ENDO GIA UNIV USDA -C: Performed by: STUDENT IN AN ORGANIZED HEALTH CARE EDUCATION/TRAINING PROGRAM

## 2022-12-21 PROCEDURE — 65270000029 HC RM PRIVATE

## 2022-12-21 PROCEDURE — 77030016151 HC PROTCTR LNS DFOG COVD -B: Performed by: STUDENT IN AN ORGANIZED HEALTH CARE EDUCATION/TRAINING PROGRAM

## 2022-12-21 PROCEDURE — 74011250637 HC RX REV CODE- 250/637: Performed by: NURSE PRACTITIONER

## 2022-12-21 PROCEDURE — 77030009965 HC RELD STPLR ENDOS COVD -D: Performed by: STUDENT IN AN ORGANIZED HEALTH CARE EDUCATION/TRAINING PROGRAM

## 2022-12-21 PROCEDURE — C1729 CATH, DRAINAGE: HCPCS | Performed by: STUDENT IN AN ORGANIZED HEALTH CARE EDUCATION/TRAINING PROGRAM

## 2022-12-21 PROCEDURE — 74011000258 HC RX REV CODE- 258: Performed by: STUDENT IN AN ORGANIZED HEALTH CARE EDUCATION/TRAINING PROGRAM

## 2022-12-21 PROCEDURE — 77030008771 HC TU NG SALEM SUMP -A: Performed by: ANESTHESIOLOGY

## 2022-12-21 PROCEDURE — 76010000135 HC OR TIME 4 TO 4.5 HR: Performed by: STUDENT IN AN ORGANIZED HEALTH CARE EDUCATION/TRAINING PROGRAM

## 2022-12-21 PROCEDURE — 74011000258 HC RX REV CODE- 258: Performed by: NURSE PRACTITIONER

## 2022-12-21 PROCEDURE — 77030002916 HC SUT ETHLN J&J -A: Performed by: STUDENT IN AN ORGANIZED HEALTH CARE EDUCATION/TRAINING PROGRAM

## 2022-12-21 PROCEDURE — 77030002888 HC SUT CHRMC J&J -A: Performed by: STUDENT IN AN ORGANIZED HEALTH CARE EDUCATION/TRAINING PROGRAM

## 2022-12-21 PROCEDURE — 74011250637 HC RX REV CODE- 250/637: Performed by: STUDENT IN AN ORGANIZED HEALTH CARE EDUCATION/TRAINING PROGRAM

## 2022-12-21 PROCEDURE — 77030031139 HC SUT VCRL2 J&J -A: Performed by: STUDENT IN AN ORGANIZED HEALTH CARE EDUCATION/TRAINING PROGRAM

## 2022-12-21 PROCEDURE — 74011000250 HC RX REV CODE- 250: Performed by: NURSE ANESTHETIST, CERTIFIED REGISTERED

## 2022-12-21 PROCEDURE — 0DTN4ZZ RESECTION OF SIGMOID COLON, PERCUTANEOUS ENDOSCOPIC APPROACH: ICD-10-PCS | Performed by: STUDENT IN AN ORGANIZED HEALTH CARE EDUCATION/TRAINING PROGRAM

## 2022-12-21 PROCEDURE — 74011250636 HC RX REV CODE- 250/636: Performed by: NURSE PRACTITIONER

## 2022-12-21 PROCEDURE — 0D1M4Z4 BYPASS DESCENDING COLON TO CUTANEOUS, PERCUTANEOUS ENDOSCOPIC APPROACH: ICD-10-PCS | Performed by: STUDENT IN AN ORGANIZED HEALTH CARE EDUCATION/TRAINING PROGRAM

## 2022-12-21 PROCEDURE — P9045 ALBUMIN (HUMAN), 5%, 250 ML: HCPCS | Performed by: NURSE ANESTHETIST, CERTIFIED REGISTERED

## 2022-12-21 PROCEDURE — 85025 COMPLETE CBC W/AUTO DIFF WBC: CPT

## 2022-12-21 PROCEDURE — 74011000250 HC RX REV CODE- 250: Performed by: STUDENT IN AN ORGANIZED HEALTH CARE EDUCATION/TRAINING PROGRAM

## 2022-12-21 PROCEDURE — 77030040361 HC SLV COMPR DVT MDII -B: Performed by: STUDENT IN AN ORGANIZED HEALTH CARE EDUCATION/TRAINING PROGRAM

## 2022-12-21 PROCEDURE — 86900 BLOOD TYPING SEROLOGIC ABO: CPT

## 2022-12-21 PROCEDURE — 2709999900 HC NON-CHARGEABLE SUPPLY: Performed by: STUDENT IN AN ORGANIZED HEALTH CARE EDUCATION/TRAINING PROGRAM

## 2022-12-21 PROCEDURE — 77030019908 HC STETH ESOPH SIMS -A: Performed by: ANESTHESIOLOGY

## 2022-12-21 PROCEDURE — 77030019927 HC TBNG IRR CYSTO BAXT -A: Performed by: STUDENT IN AN ORGANIZED HEALTH CARE EDUCATION/TRAINING PROGRAM

## 2022-12-21 PROCEDURE — 74011250636 HC RX REV CODE- 250/636: Performed by: NURSE ANESTHETIST, CERTIFIED REGISTERED

## 2022-12-21 PROCEDURE — 77030008462 HC STPLR SKN PROX J&J -A: Performed by: STUDENT IN AN ORGANIZED HEALTH CARE EDUCATION/TRAINING PROGRAM

## 2022-12-21 PROCEDURE — 82962 GLUCOSE BLOOD TEST: CPT

## 2022-12-21 PROCEDURE — 77030012770 HC TRCR OPT FX AMR -B: Performed by: STUDENT IN AN ORGANIZED HEALTH CARE EDUCATION/TRAINING PROGRAM

## 2022-12-21 PROCEDURE — 74011000250 HC RX REV CODE- 250: Performed by: COLON & RECTAL SURGERY

## 2022-12-21 PROCEDURE — 74011250636 HC RX REV CODE- 250/636: Performed by: STUDENT IN AN ORGANIZED HEALTH CARE EDUCATION/TRAINING PROGRAM

## 2022-12-21 PROCEDURE — 74011250636 HC RX REV CODE- 250/636: Performed by: ANESTHESIOLOGY

## 2022-12-21 PROCEDURE — 74011000258 HC RX REV CODE- 258: Performed by: COLON & RECTAL SURGERY

## 2022-12-21 PROCEDURE — 77030020061 HC IV BLD WRMR ADMIN SET 3M -B: Performed by: ANESTHESIOLOGY

## 2022-12-21 PROCEDURE — 0T788DZ DILATION OF BILATERAL URETERS WITH INTRALUMINAL DEVICE, VIA NATURAL OR ARTIFICIAL OPENING ENDOSCOPIC: ICD-10-PCS | Performed by: UROLOGY

## 2022-12-21 PROCEDURE — 74011636637 HC RX REV CODE- 636/637: Performed by: STUDENT IN AN ORGANIZED HEALTH CARE EDUCATION/TRAINING PROGRAM

## 2022-12-21 PROCEDURE — 77030041238 HC TBNG INSUF MDII -A: Performed by: STUDENT IN AN ORGANIZED HEALTH CARE EDUCATION/TRAINING PROGRAM

## 2022-12-21 PROCEDURE — 77030040830 HC CATH URETH FOL MDII -A: Performed by: STUDENT IN AN ORGANIZED HEALTH CARE EDUCATION/TRAINING PROGRAM

## 2022-12-21 PROCEDURE — 77030040506 HC DRN WND MDII -A: Performed by: STUDENT IN AN ORGANIZED HEALTH CARE EDUCATION/TRAINING PROGRAM

## 2022-12-21 PROCEDURE — 77030008756 HC TU IRR SUC STRY -B: Performed by: STUDENT IN AN ORGANIZED HEALTH CARE EDUCATION/TRAINING PROGRAM

## 2022-12-21 PROCEDURE — 77030013079 HC BLNKT BAIR HGGR 3M -A: Performed by: ANESTHESIOLOGY

## 2022-12-21 PROCEDURE — 80048 BASIC METABOLIC PNL TOTAL CA: CPT

## 2022-12-21 PROCEDURE — 74011250637 HC RX REV CODE- 250/637: Performed by: ANESTHESIOLOGY

## 2022-12-21 PROCEDURE — 36415 COLL VENOUS BLD VENIPUNCTURE: CPT

## 2022-12-21 PROCEDURE — 77030026438 HC STYL ET INTUB CARD -A: Performed by: ANESTHESIOLOGY

## 2022-12-21 PROCEDURE — 77030019702 HC WRP THER MENM -C: Performed by: STUDENT IN AN ORGANIZED HEALTH CARE EDUCATION/TRAINING PROGRAM

## 2022-12-21 PROCEDURE — 74011250637 HC RX REV CODE- 250/637: Performed by: COLON & RECTAL SURGERY

## 2022-12-21 PROCEDURE — 76060000039 HC ANESTHESIA 4 TO 4.5 HR: Performed by: STUDENT IN AN ORGANIZED HEALTH CARE EDUCATION/TRAINING PROGRAM

## 2022-12-21 PROCEDURE — 74011250636 HC RX REV CODE- 250/636: Performed by: COLON & RECTAL SURGERY

## 2022-12-21 PROCEDURE — 88309 TISSUE EXAM BY PATHOLOGIST: CPT

## 2022-12-21 RX ORDER — DEXMEDETOMIDINE HYDROCHLORIDE 100 UG/ML
INJECTION, SOLUTION INTRAVENOUS AS NEEDED
Status: DISCONTINUED | OUTPATIENT
Start: 2022-12-21 | End: 2022-12-21 | Stop reason: HOSPADM

## 2022-12-21 RX ORDER — ONDANSETRON 2 MG/ML
4 INJECTION INTRAMUSCULAR; INTRAVENOUS
Status: DISCONTINUED | OUTPATIENT
Start: 2022-12-21 | End: 2022-12-23 | Stop reason: HOSPADM

## 2022-12-21 RX ORDER — ONDANSETRON 4 MG/1
4 TABLET, ORALLY DISINTEGRATING ORAL
Status: DISCONTINUED | OUTPATIENT
Start: 2022-12-21 | End: 2022-12-23 | Stop reason: HOSPADM

## 2022-12-21 RX ORDER — HYDROCODONE BITARTRATE AND ACETAMINOPHEN 5; 325 MG/1; MG/1
1 TABLET ORAL AS NEEDED
Status: DISCONTINUED | OUTPATIENT
Start: 2022-12-21 | End: 2022-12-21 | Stop reason: HOSPADM

## 2022-12-21 RX ORDER — OXYCODONE HYDROCHLORIDE 5 MG/1
5 TABLET ORAL
Status: DISCONTINUED | OUTPATIENT
Start: 2022-12-21 | End: 2022-12-23 | Stop reason: HOSPADM

## 2022-12-21 RX ORDER — SODIUM CHLORIDE 9 MG/ML
25 INJECTION, SOLUTION INTRAVENOUS CONTINUOUS
Status: DISCONTINUED | OUTPATIENT
Start: 2022-12-21 | End: 2022-12-21 | Stop reason: HOSPADM

## 2022-12-21 RX ORDER — KETAMINE HYDROCHLORIDE 10 MG/ML
INJECTION, SOLUTION INTRAMUSCULAR; INTRAVENOUS AS NEEDED
Status: DISCONTINUED | OUTPATIENT
Start: 2022-12-21 | End: 2022-12-21 | Stop reason: HOSPADM

## 2022-12-21 RX ORDER — MIDAZOLAM HYDROCHLORIDE 1 MG/ML
1 INJECTION, SOLUTION INTRAMUSCULAR; INTRAVENOUS AS NEEDED
Status: DISCONTINUED | OUTPATIENT
Start: 2022-12-21 | End: 2022-12-21 | Stop reason: HOSPADM

## 2022-12-21 RX ORDER — SODIUM CHLORIDE, SODIUM LACTATE, POTASSIUM CHLORIDE, CALCIUM CHLORIDE 600; 310; 30; 20 MG/100ML; MG/100ML; MG/100ML; MG/100ML
1000 INJECTION, SOLUTION INTRAVENOUS CONTINUOUS
Status: DISCONTINUED | OUTPATIENT
Start: 2022-12-21 | End: 2022-12-21

## 2022-12-21 RX ORDER — ACETAMINOPHEN 325 MG/1
650 TABLET ORAL EVERY 6 HOURS
Status: DISCONTINUED | OUTPATIENT
Start: 2022-12-21 | End: 2022-12-23 | Stop reason: HOSPADM

## 2022-12-21 RX ORDER — INSULIN LISPRO 100 [IU]/ML
INJECTION, SOLUTION INTRAVENOUS; SUBCUTANEOUS
Status: DISCONTINUED | OUTPATIENT
Start: 2022-12-21 | End: 2022-12-23 | Stop reason: HOSPADM

## 2022-12-21 RX ORDER — DIPHENHYDRAMINE HYDROCHLORIDE 50 MG/ML
12.5 INJECTION, SOLUTION INTRAMUSCULAR; INTRAVENOUS AS NEEDED
Status: DISCONTINUED | OUTPATIENT
Start: 2022-12-21 | End: 2022-12-21 | Stop reason: HOSPADM

## 2022-12-21 RX ORDER — SODIUM CHLORIDE 9 MG/ML
25 INJECTION, SOLUTION INTRAVENOUS CONTINUOUS
Status: DISCONTINUED | OUTPATIENT
Start: 2022-12-21 | End: 2022-12-21

## 2022-12-21 RX ORDER — MIDAZOLAM HYDROCHLORIDE 1 MG/ML
0.5 INJECTION, SOLUTION INTRAMUSCULAR; INTRAVENOUS
Status: DISCONTINUED | OUTPATIENT
Start: 2022-12-21 | End: 2022-12-21 | Stop reason: HOSPADM

## 2022-12-21 RX ORDER — HYDROMORPHONE HYDROCHLORIDE 1 MG/ML
0.25 INJECTION, SOLUTION INTRAMUSCULAR; INTRAVENOUS; SUBCUTANEOUS
Status: DISCONTINUED | OUTPATIENT
Start: 2022-12-21 | End: 2022-12-23 | Stop reason: HOSPADM

## 2022-12-21 RX ORDER — FENTANYL CITRATE 50 UG/ML
25 INJECTION, SOLUTION INTRAMUSCULAR; INTRAVENOUS
Status: COMPLETED | OUTPATIENT
Start: 2022-12-21 | End: 2022-12-21

## 2022-12-21 RX ORDER — NEOSTIGMINE METHYLSULFATE 1 MG/ML
INJECTION, SOLUTION INTRAVENOUS AS NEEDED
Status: DISCONTINUED | OUTPATIENT
Start: 2022-12-21 | End: 2022-12-21 | Stop reason: HOSPADM

## 2022-12-21 RX ORDER — LIDOCAINE HYDROCHLORIDE 20 MG/ML
INJECTION, SOLUTION EPIDURAL; INFILTRATION; INTRACAUDAL; PERINEURAL AS NEEDED
Status: DISCONTINUED | OUTPATIENT
Start: 2022-12-21 | End: 2022-12-21 | Stop reason: HOSPADM

## 2022-12-21 RX ORDER — HYDROMORPHONE HYDROCHLORIDE 2 MG/ML
INJECTION, SOLUTION INTRAMUSCULAR; INTRAVENOUS; SUBCUTANEOUS AS NEEDED
Status: DISCONTINUED | OUTPATIENT
Start: 2022-12-21 | End: 2022-12-21 | Stop reason: HOSPADM

## 2022-12-21 RX ORDER — DEXAMETHASONE SODIUM PHOSPHATE 4 MG/ML
INJECTION, SOLUTION INTRA-ARTICULAR; INTRALESIONAL; INTRAMUSCULAR; INTRAVENOUS; SOFT TISSUE AS NEEDED
Status: DISCONTINUED | OUTPATIENT
Start: 2022-12-21 | End: 2022-12-21 | Stop reason: HOSPADM

## 2022-12-21 RX ORDER — SODIUM CHLORIDE 0.9 % (FLUSH) 0.9 %
5-40 SYRINGE (ML) INJECTION AS NEEDED
Status: DISCONTINUED | OUTPATIENT
Start: 2022-12-21 | End: 2022-12-21 | Stop reason: HOSPADM

## 2022-12-21 RX ORDER — FENTANYL CITRATE 50 UG/ML
INJECTION, SOLUTION INTRAMUSCULAR; INTRAVENOUS AS NEEDED
Status: DISCONTINUED | OUTPATIENT
Start: 2022-12-21 | End: 2022-12-21 | Stop reason: HOSPADM

## 2022-12-21 RX ORDER — SODIUM CHLORIDE, SODIUM LACTATE, POTASSIUM CHLORIDE, CALCIUM CHLORIDE 600; 310; 30; 20 MG/100ML; MG/100ML; MG/100ML; MG/100ML
1000 INJECTION, SOLUTION INTRAVENOUS CONTINUOUS
Status: DISCONTINUED | OUTPATIENT
Start: 2022-12-21 | End: 2022-12-21 | Stop reason: HOSPADM

## 2022-12-21 RX ORDER — OXYCODONE HYDROCHLORIDE 5 MG/1
10 TABLET ORAL
Status: DISCONTINUED | OUTPATIENT
Start: 2022-12-21 | End: 2022-12-23 | Stop reason: HOSPADM

## 2022-12-21 RX ORDER — GLYCOPYRROLATE 0.2 MG/ML
INJECTION INTRAMUSCULAR; INTRAVENOUS AS NEEDED
Status: DISCONTINUED | OUTPATIENT
Start: 2022-12-21 | End: 2022-12-21 | Stop reason: HOSPADM

## 2022-12-21 RX ORDER — HYDROMORPHONE HYDROCHLORIDE 1 MG/ML
0.5 INJECTION, SOLUTION INTRAMUSCULAR; INTRAVENOUS; SUBCUTANEOUS
Status: DISCONTINUED | OUTPATIENT
Start: 2022-12-21 | End: 2022-12-23 | Stop reason: HOSPADM

## 2022-12-21 RX ORDER — ONDANSETRON 2 MG/ML
4 INJECTION INTRAMUSCULAR; INTRAVENOUS AS NEEDED
Status: DISCONTINUED | OUTPATIENT
Start: 2022-12-21 | End: 2022-12-21 | Stop reason: HOSPADM

## 2022-12-21 RX ORDER — ONDANSETRON 2 MG/ML
INJECTION INTRAMUSCULAR; INTRAVENOUS AS NEEDED
Status: DISCONTINUED | OUTPATIENT
Start: 2022-12-21 | End: 2022-12-21 | Stop reason: HOSPADM

## 2022-12-21 RX ORDER — ROCURONIUM BROMIDE 10 MG/ML
INJECTION, SOLUTION INTRAVENOUS AS NEEDED
Status: DISCONTINUED | OUTPATIENT
Start: 2022-12-21 | End: 2022-12-21 | Stop reason: HOSPADM

## 2022-12-21 RX ORDER — SUCCINYLCHOLINE CHLORIDE 20 MG/ML
INJECTION INTRAMUSCULAR; INTRAVENOUS AS NEEDED
Status: DISCONTINUED | OUTPATIENT
Start: 2022-12-21 | End: 2022-12-21 | Stop reason: HOSPADM

## 2022-12-21 RX ORDER — HYDROMORPHONE HYDROCHLORIDE 1 MG/ML
0.2 INJECTION, SOLUTION INTRAMUSCULAR; INTRAVENOUS; SUBCUTANEOUS
Status: DISCONTINUED | OUTPATIENT
Start: 2022-12-21 | End: 2022-12-21 | Stop reason: HOSPADM

## 2022-12-21 RX ORDER — ACETAMINOPHEN 325 MG/1
650 TABLET ORAL ONCE
Status: COMPLETED | OUTPATIENT
Start: 2022-12-21 | End: 2022-12-21

## 2022-12-21 RX ORDER — MAGNESIUM SULFATE 100 %
4 CRYSTALS MISCELLANEOUS AS NEEDED
Status: DISCONTINUED | OUTPATIENT
Start: 2022-12-21 | End: 2022-12-23 | Stop reason: HOSPADM

## 2022-12-21 RX ORDER — MORPHINE SULFATE 2 MG/ML
2 INJECTION, SOLUTION INTRAMUSCULAR; INTRAVENOUS
Status: DISCONTINUED | OUTPATIENT
Start: 2022-12-21 | End: 2022-12-21 | Stop reason: HOSPADM

## 2022-12-21 RX ORDER — CLOTRIMAZOLE 10 MG/1
10 LOZENGE ORAL; TOPICAL
Status: DISCONTINUED | OUTPATIENT
Start: 2022-12-21 | End: 2022-12-23 | Stop reason: HOSPADM

## 2022-12-21 RX ORDER — GLYCOPYRROLATE 0.2 MG/ML
0.2 INJECTION INTRAMUSCULAR; INTRAVENOUS
Status: DISCONTINUED | OUTPATIENT
Start: 2022-12-21 | End: 2022-12-21 | Stop reason: HOSPADM

## 2022-12-21 RX ORDER — SODIUM CHLORIDE 0.9 % (FLUSH) 0.9 %
5-40 SYRINGE (ML) INJECTION EVERY 8 HOURS
Status: DISCONTINUED | OUTPATIENT
Start: 2022-12-21 | End: 2022-12-21 | Stop reason: HOSPADM

## 2022-12-21 RX ORDER — ENOXAPARIN SODIUM 100 MG/ML
40 INJECTION SUBCUTANEOUS DAILY
Status: DISCONTINUED | OUTPATIENT
Start: 2022-12-22 | End: 2022-12-23 | Stop reason: HOSPADM

## 2022-12-21 RX ORDER — ROPIVACAINE HYDROCHLORIDE 5 MG/ML
30 INJECTION, SOLUTION EPIDURAL; INFILTRATION; PERINEURAL AS NEEDED
Status: DISCONTINUED | OUTPATIENT
Start: 2022-12-21 | End: 2022-12-21 | Stop reason: HOSPADM

## 2022-12-21 RX ORDER — LIDOCAINE HYDROCHLORIDE 10 MG/ML
0.1 INJECTION, SOLUTION EPIDURAL; INFILTRATION; INTRACAUDAL; PERINEURAL AS NEEDED
Status: DISCONTINUED | OUTPATIENT
Start: 2022-12-21 | End: 2022-12-21 | Stop reason: HOSPADM

## 2022-12-21 RX ORDER — ALBUMIN HUMAN 50 G/1000ML
SOLUTION INTRAVENOUS AS NEEDED
Status: DISCONTINUED | OUTPATIENT
Start: 2022-12-21 | End: 2022-12-21 | Stop reason: HOSPADM

## 2022-12-21 RX ORDER — KETOROLAC TROMETHAMINE 30 MG/ML
15 INJECTION, SOLUTION INTRAMUSCULAR; INTRAVENOUS EVERY 6 HOURS
Status: DISCONTINUED | OUTPATIENT
Start: 2022-12-22 | End: 2022-12-23 | Stop reason: HOSPADM

## 2022-12-21 RX ORDER — BUPIVACAINE HYDROCHLORIDE 2.5 MG/ML
INJECTION, SOLUTION EPIDURAL; INFILTRATION; INTRACAUDAL AS NEEDED
Status: DISCONTINUED | OUTPATIENT
Start: 2022-12-21 | End: 2022-12-21 | Stop reason: HOSPADM

## 2022-12-21 RX ORDER — ALBUTEROL SULFATE 0.83 MG/ML
2.5 SOLUTION RESPIRATORY (INHALATION) AS NEEDED
Status: DISCONTINUED | OUTPATIENT
Start: 2022-12-21 | End: 2022-12-21 | Stop reason: HOSPADM

## 2022-12-21 RX ORDER — PROPOFOL 10 MG/ML
INJECTION, EMULSION INTRAVENOUS AS NEEDED
Status: DISCONTINUED | OUTPATIENT
Start: 2022-12-21 | End: 2022-12-21 | Stop reason: HOSPADM

## 2022-12-21 RX ORDER — MIDAZOLAM HYDROCHLORIDE 1 MG/ML
INJECTION, SOLUTION INTRAMUSCULAR; INTRAVENOUS AS NEEDED
Status: DISCONTINUED | OUTPATIENT
Start: 2022-12-21 | End: 2022-12-21 | Stop reason: HOSPADM

## 2022-12-21 RX ORDER — FENTANYL CITRATE 50 UG/ML
50 INJECTION, SOLUTION INTRAMUSCULAR; INTRAVENOUS AS NEEDED
Status: DISCONTINUED | OUTPATIENT
Start: 2022-12-21 | End: 2022-12-21 | Stop reason: HOSPADM

## 2022-12-21 RX ORDER — LIDOCAINE 4 G/100G
1 PATCH TOPICAL EVERY 24 HOURS
Status: DISCONTINUED | OUTPATIENT
Start: 2022-12-21 | End: 2022-12-23 | Stop reason: HOSPADM

## 2022-12-21 RX ADMIN — HYDROMORPHONE HYDROCHLORIDE 0.5 MG: 2 INJECTION, SOLUTION INTRAMUSCULAR; INTRAVENOUS; SUBCUTANEOUS at 18:01

## 2022-12-21 RX ADMIN — SODIUM CHLORIDE 100 MG: 9 INJECTION, SOLUTION INTRAVENOUS at 09:47

## 2022-12-21 RX ADMIN — FENTANYL CITRATE 25 MCG: 50 INJECTION, SOLUTION INTRAMUSCULAR; INTRAVENOUS at 21:51

## 2022-12-21 RX ADMIN — SODIUM CHLORIDE, POTASSIUM CHLORIDE, SODIUM LACTATE AND CALCIUM CHLORIDE 50 ML/HR: 600; 310; 30; 20 INJECTION, SOLUTION INTRAVENOUS at 06:38

## 2022-12-21 RX ADMIN — MORPHINE SULFATE 2 MG: 2 INJECTION, SOLUTION INTRAMUSCULAR; INTRAVENOUS at 09:57

## 2022-12-21 RX ADMIN — ROCURONIUM BROMIDE 10 MG: 10 SOLUTION INTRAVENOUS at 19:37

## 2022-12-21 RX ADMIN — PHENYLEPHRINE HYDROCHLORIDE 20 MCG/MIN: 10 INJECTION INTRAVENOUS at 16:57

## 2022-12-21 RX ADMIN — ROCURONIUM BROMIDE 10 MG: 10 SOLUTION INTRAVENOUS at 16:57

## 2022-12-21 RX ADMIN — ACETAMINOPHEN 650 MG: 325 TABLET ORAL at 03:10

## 2022-12-21 RX ADMIN — NEOSTIGMINE METHYLSULFATE 3 MG: 1 INJECTION, SOLUTION INTRAVENOUS at 20:48

## 2022-12-21 RX ADMIN — DEXAMETHASONE SODIUM PHOSPHATE 4 MG: 4 INJECTION, SOLUTION INTRAMUSCULAR; INTRAVENOUS at 17:02

## 2022-12-21 RX ADMIN — Medication 3 UNITS: at 21:53

## 2022-12-21 RX ADMIN — FENTANYL CITRATE 25 MCG: 50 INJECTION, SOLUTION INTRAMUSCULAR; INTRAVENOUS at 21:56

## 2022-12-21 RX ADMIN — CLOTRIMAZOLE 10 MG: 10 LOZENGE ORAL; TOPICAL at 12:54

## 2022-12-21 RX ADMIN — ONDANSETRON HYDROCHLORIDE 4 MG: 2 INJECTION, SOLUTION INTRAMUSCULAR; INTRAVENOUS at 20:34

## 2022-12-21 RX ADMIN — Medication 20 MG: at 18:08

## 2022-12-21 RX ADMIN — PIPERACILLIN AND TAZOBACTAM 3.38 G: 3; .375 INJECTION, POWDER, LYOPHILIZED, FOR SOLUTION INTRAVENOUS at 21:23

## 2022-12-21 RX ADMIN — PROPOFOL 150 MG: 10 INJECTION, EMULSION INTRAVENOUS at 16:57

## 2022-12-21 RX ADMIN — HYDROMORPHONE HYDROCHLORIDE 0.5 MG: 2 INJECTION, SOLUTION INTRAMUSCULAR; INTRAVENOUS; SUBCUTANEOUS at 20:44

## 2022-12-21 RX ADMIN — SUCCINYLCHOLINE CHLORIDE 140 MG: 20 INJECTION, SOLUTION INTRAMUSCULAR; INTRAVENOUS at 16:57

## 2022-12-21 RX ADMIN — MORPHINE SULFATE 2 MG: 2 INJECTION, SOLUTION INTRAMUSCULAR; INTRAVENOUS at 01:34

## 2022-12-21 RX ADMIN — ROCURONIUM BROMIDE 40 MG: 10 SOLUTION INTRAVENOUS at 17:02

## 2022-12-21 RX ADMIN — FENTANYL CITRATE 25 MCG: 50 INJECTION, SOLUTION INTRAMUSCULAR; INTRAVENOUS at 21:49

## 2022-12-21 RX ADMIN — FENTANYL CITRATE 50 MCG: 50 INJECTION INTRAMUSCULAR; INTRAVENOUS at 16:48

## 2022-12-21 RX ADMIN — KETOROLAC TROMETHAMINE 15 MG: 30 INJECTION, SOLUTION INTRAMUSCULAR; INTRAVENOUS at 23:14

## 2022-12-21 RX ADMIN — ACETAMINOPHEN 650 MG: 325 TABLET ORAL at 22:37

## 2022-12-21 RX ADMIN — PIPERACILLIN AND TAZOBACTAM 3.38 G: 3; .375 INJECTION, POWDER, LYOPHILIZED, FOR SOLUTION INTRAVENOUS at 06:36

## 2022-12-21 RX ADMIN — ROCURONIUM BROMIDE 30 MG: 10 SOLUTION INTRAVENOUS at 17:50

## 2022-12-21 RX ADMIN — PIPERACILLIN AND TAZOBACTAM 3.38 G: 3; .375 INJECTION, POWDER, LYOPHILIZED, FOR SOLUTION INTRAVENOUS at 12:54

## 2022-12-21 RX ADMIN — MIDAZOLAM 2 MG: 1 INJECTION INTRAMUSCULAR; INTRAVENOUS at 16:45

## 2022-12-21 RX ADMIN — GLYCOPYRROLATE 0.3 MG: 0.2 INJECTION INTRAMUSCULAR; INTRAVENOUS at 20:48

## 2022-12-21 RX ADMIN — HYDROMORPHONE HYDROCHLORIDE 0.5 MG: 2 INJECTION, SOLUTION INTRAMUSCULAR; INTRAVENOUS; SUBCUTANEOUS at 20:34

## 2022-12-21 RX ADMIN — CALCIUM GLUCONATE: 94 INJECTION, SOLUTION INTRAVENOUS at 21:24

## 2022-12-21 RX ADMIN — FENTANYL CITRATE 100 MCG: 50 INJECTION INTRAMUSCULAR; INTRAVENOUS at 16:57

## 2022-12-21 RX ADMIN — ALBUMIN (HUMAN) 250 ML: 12.5 INJECTION, SOLUTION INTRAVENOUS at 17:11

## 2022-12-21 RX ADMIN — FENTANYL CITRATE 25 MCG: 50 INJECTION, SOLUTION INTRAMUSCULAR; INTRAVENOUS at 21:41

## 2022-12-21 RX ADMIN — ACETAMINOPHEN 650 MG: 325 TABLET ORAL at 16:33

## 2022-12-21 RX ADMIN — SODIUM CHLORIDE, PRESERVATIVE FREE 10 ML: 5 INJECTION INTRAVENOUS at 21:29

## 2022-12-21 RX ADMIN — CLOTRIMAZOLE 10 MG: 10 LOZENGE ORAL; TOPICAL at 23:14

## 2022-12-21 RX ADMIN — LIDOCAINE HYDROCHLORIDE 100 MG: 20 INJECTION, SOLUTION EPIDURAL; INFILTRATION; INTRACAUDAL; PERINEURAL at 16:57

## 2022-12-21 RX ADMIN — ROCURONIUM BROMIDE 10 MG: 10 SOLUTION INTRAVENOUS at 18:57

## 2022-12-21 RX ADMIN — SODIUM CHLORIDE, POTASSIUM CHLORIDE, SODIUM LACTATE AND CALCIUM CHLORIDE: 600; 310; 30; 20 INJECTION, SOLUTION INTRAVENOUS at 18:45

## 2022-12-21 RX ADMIN — HYDROMORPHONE HYDROCHLORIDE 0.5 MG: 2 INJECTION, SOLUTION INTRAMUSCULAR; INTRAVENOUS; SUBCUTANEOUS at 20:37

## 2022-12-21 RX ADMIN — I.V. FAT EMULSION 500 ML: 20 EMULSION INTRAVENOUS at 21:36

## 2022-12-21 RX ADMIN — DEXMEDETOMIDINE HYDROCHLORIDE 10 MCG: 100 INJECTION, SOLUTION, CONCENTRATE INTRAVENOUS at 21:00

## 2022-12-21 NOTE — OP NOTES
Operative Note    Patient: Radha Bernabe  YOB: 1965  MRN: [id number]    Date of Procedure: 12/21/2022     Pre-Op Diagnosis: DIVERTICULITIS    Post-Op Diagnosis: Same as preoperative diagnosis. Procedure(s):  LAPAROSCOPIC, POSSIBLE OPEN SIGMOID COLECTOMY  CYSTOSCOPY WITH INSERTION BILATERAL URETERAL CATHETERS    Surgeon(s):  MD Sydnee Holm MD    Surgical Assistant: Surg Asst-1: Vishal VERA    Anesthesia: General     Estimated Blood Loss (mL): None    Complications: None    Specimens: * No specimens in log *     Implants: * No implants in log *    Drains: * No LDAs found *    Findings: Cystoscopy was performed and showed no significant issues related to his lower urinary tract. There is mild BPH evident but no significant obstruction seen. The bladder proper shows no evidence of any cancer, stones or erythema or suggestion of urinary tract infection or fistulas. H ureteral orifice was orthotopic. Detailed Description of Procedure:   Cystoscopy and placement of bilateral ureteral catheters for purposes of ureteral identification.     Dictated on 12/21/2022 dictation #[id number]    Electronically Signed by Loly Rodriguez MD on 12/21/2022 at 5:36 PM

## 2022-12-21 NOTE — PROGRESS NOTES
12/21/22 1756   Family Communication   Family Update Message Procedure started   Delivery Origin Nurse    Relationship to Patient Spouse    Phone Number [phone]   Family/Significant Other Update Called

## 2022-12-21 NOTE — PROGRESS NOTES
ID Progress Note  2022    Subjective:     \"I think I have thrush\"    Review of Systems:            Symptom Y/N Comments   Symptom Y/N Comments   Fever/Chills n      Chest Pain n       Poor Appetite       Edema        Cough       Abdominal Pain y      Sputum       Joint Pain        SOB/FISH  n     Pruritis/Rash        Nausea/vomit  n     Tolerating PT/OT        Diarrhea  y  same as POA   Tolerating Diet        Constipation       Other           Could NOT obtain due to:       Objective:     Vitals: Visit Vitals  BP (!) 147/78   Pulse 84   Temp 99.1 °F (37.3 °C)   Resp 16   Ht 5' 9\" (1.753 m)   Wt 88.1 kg (194 lb 3.6 oz)   SpO2 94%   BMI 28.68 kg/m²          Tmax:  Temp (24hrs), Av.1 °F (37.3 °C), Min:98 °F (36.7 °C), Max:100.1 °F (37.8 °C)      PHYSICAL EXAM:  General: WD, WN. Alert, cooperative, no acute distress    EENT:  EOMI. Anicteric sclerae. MMM. Coated tongue  Resp:  CTA bilaterally, no wheezing or rales. No accessory muscle use  CV:  Regular  rhythm,  No edema  GI:  Soft, Non distended, lower abd tender; L>R  +Bowel sounds  Neurologic:  Alert and oriented X 3, normal speech,   Psych:   Good insight. Not anxious nor agitated  Skin:  No rashes. No jaundice    Labs:   Lab Results   Component Value Date/Time    WBC 14.5 (H) 2022 01:35 AM    HGB 9.0 (L) 2022 01:35 AM    HCT 27.6 (L) 2022 01:35 AM    PLATELET 941  01:35 AM    MCV 96.2 2022 01:35 AM     Lab Results   Component Value Date/Time    Sodium 131 (L) 2022 01:35 AM    Potassium 4.0 2022 01:35 AM    Chloride 102 2022 01:35 AM    CO2 25 2022 01:35 AM    Anion gap 4 (L) 2022 01:35 AM    Glucose 163 (H) 2022 01:35 AM    BUN 10 2022 01:35 AM    Creatinine 0.79 2022 01:35 AM    BUN/Creatinine ratio 13 2022 01:35 AM    Calcium 8.3 (L) 2022 01:35 AM    Bilirubin, total 0.3 2022 12:52 AM    Alk.  phosphatase 139 (H) 2022 12:52 AM    Protein, total 7.4 12/17/2022 12:52 AM    Albumin 2.3 (L) 12/17/2022 12:52 AM    Globulin 5.1 (H) 12/17/2022 12:52 AM    A-G Ratio 0.5 (L) 12/17/2022 12:52 AM    ALT (SGPT) 22 12/17/2022 12:52 AM         Cultures:   Results       Procedure Component Value Units Date/Time    CULTURE, BLOOD, PAIRED [[id number]] Collected: 12/14/22 0901    Order Status: Completed Specimen: Blood Updated: 12/19/22 2114     Special Requests: NO SPECIAL REQUESTS        Culture result: NO GROWTH 5 DAYS       COVID-19 WITH INFLUENZA A/B [[id number]] Collected: 12/09/22 1030    Order Status: Completed Specimen: Nasopharyngeal Updated: 12/09/22 1330     SARS-CoV-2 by PCR Not detected        Comment: Not Detected results do not preclude SARS-CoV-2 infection and should not be used as the sole basis for patient management decisions. Results must be combined with clinical observations, patient history, and epidemiological information. Influenza A by PCR Not detected        Influenza B by PCR Not detected        Comment: Testing was performed using arsenio Nora SARS-CoV-2 and Influenza A/B nucleic acid assay. This test is a multiplex Real-Time Reverse Transcriptase Polymerase Chain Reaction (RT-PCR) based in vitro diagnostic test intended for the qualitative detection of nucleic acids from SARS-CoV-2, Influenza A, and Influenza B in nasopharyngeal and nasal swab specimens for use under the FDA's Emergency Use Authorization (EUA) only.        Fact sheet for Patients: FindDrives.pl  Fact sheet for Healthcare Providers: FindD[employer].pl         CULTURE, BLOOD, PAIRED [[id number]] Collected: 12/08/22 1445    Order Status: Completed Specimen: Blood Updated: 12/14/22 0633     Special Requests: NO SPECIAL REQUESTS        Culture result: NO GROWTH 6 DAYS                 Impression:   Diverticulitis  Fever  - afebrile overnight, wbc 14.5    Blood cx (12/8) no growth (12/14) no growth so far    CT of abd/pel (12/14) Persistent acute sigmoid colon diverticulitis with contained perforation. Mass like inflammatory changes and associated locules of gas and tiny pockets of fluid adjacent to the sigmoid colon, abutting the left iliopsoas muscle, are not significantly changed. No drainable collection is evident.     Oral candidiasis  - ordered Mycelex; pt requested tablet not elixir     Tobacco abuse  - nicotine patchy ordered  Plan:    Continue with IV zosyn and IV Eraxis    Continue with Probiotic therapy   PICC line placed on 12/15 - TPN in progress   Adjust abx prn   CRS following; possible surgical intervention today   Fever work up if temp >= 100.4 every 24 hours   Recommend influenza vaccine prior to discharge; pt agreed    Above plan of care discussed and agreed with Dr. Shelby Avalos, NP

## 2022-12-21 NOTE — ANESTHESIA PREPROCEDURE EVALUATION
Relevant Problems   No relevant active problems       Anesthetic History   No history of anesthetic complications            Review of Systems / Medical History  Patient summary reviewed, nursing notes reviewed and pertinent labs reviewed    Pulmonary  Within defined limits                 Neuro/Psych   Within defined limits           Cardiovascular  Within defined limits                Exercise tolerance: >4 METS     GI/Hepatic/Renal  Within defined limits             Comments: diverticulitis Endo/Other        Anemia     Other Findings              Physical Exam    Airway  Mallampati: II  TM Distance: > 6 cm  Neck ROM: normal range of motion   Mouth opening: Normal     Cardiovascular  Regular rate and rhythm,  S1 and S2 normal,  no murmur, click, rub, or gallop             Dental  No notable dental hx       Pulmonary  Breath sounds clear to auscultation               Abdominal  GI exam deferred       Other Findings            Anesthetic Plan    ASA: 2  Anesthesia type: general          Induction: Intravenous  Anesthetic plan and risks discussed with: Patient

## 2022-12-22 LAB
ANION GAP SERPL CALC-SCNC: 3 MMOL/L (ref 5–15)
BASOPHILS # BLD: 0 K/UL (ref 0–0.1)
BASOPHILS NFR BLD: 0 % (ref 0–1)
BUN SERPL-MCNC: 12 MG/DL (ref 6–20)
BUN/CREAT SERPL: 14 (ref 12–20)
CALCIUM SERPL-MCNC: 8 MG/DL (ref 8.5–10.1)
CHLORIDE SERPL-SCNC: 105 MMOL/L (ref 97–108)
CO2 SERPL-SCNC: 23 MMOL/L (ref 21–32)
CREAT SERPL-MCNC: 0.88 MG/DL (ref 0.7–1.3)
DIFFERENTIAL METHOD BLD: ABNORMAL
EOSINOPHIL # BLD: 0 K/UL (ref 0–0.4)
EOSINOPHIL NFR BLD: 0 % (ref 0–7)
ERYTHROCYTE [DISTWIDTH] IN BLOOD BY AUTOMATED COUNT: 12.7 % (ref 11.5–14.5)
EST. AVERAGE GLUCOSE BLD GHB EST-MCNC: 131 MG/DL
GLUCOSE BLD STRIP.AUTO-MCNC: 355 MG/DL (ref 65–117)
GLUCOSE BLD STRIP.AUTO-MCNC: 377 MG/DL (ref 65–117)
GLUCOSE BLD STRIP.AUTO-MCNC: 404 MG/DL (ref 65–117)
GLUCOSE BLD STRIP.AUTO-MCNC: 474 MG/DL (ref 65–117)
GLUCOSE BLD STRIP.AUTO-MCNC: 537 MG/DL (ref 65–117)
GLUCOSE SERPL-MCNC: 435 MG/DL (ref 65–100)
HBA1C MFR BLD: 6.2 % (ref 4–5.6)
HCT VFR BLD AUTO: 28.3 % (ref 36.6–50.3)
HGB BLD-MCNC: 9.3 G/DL (ref 12.1–17)
IMM GRANULOCYTES # BLD AUTO: 0.2 K/UL (ref 0–0.04)
IMM GRANULOCYTES NFR BLD AUTO: 1 % (ref 0–0.5)
LYMPHOCYTES # BLD: 0.4 K/UL (ref 0.8–3.5)
LYMPHOCYTES NFR BLD: 2 % (ref 12–49)
MCH RBC QN AUTO: 30.9 PG (ref 26–34)
MCHC RBC AUTO-ENTMCNC: 32.9 G/DL (ref 30–36.5)
MCV RBC AUTO: 94 FL (ref 80–99)
MONOCYTES # BLD: 0.7 K/UL (ref 0–1)
MONOCYTES NFR BLD: 4 % (ref 5–13)
NEUTS SEG # BLD: 16.9 K/UL (ref 1.8–8)
NEUTS SEG NFR BLD: 93 % (ref 32–75)
NRBC # BLD: 0 K/UL (ref 0–0.01)
NRBC BLD-RTO: 0 PER 100 WBC
PLATELET # BLD AUTO: 283 K/UL (ref 150–400)
PMV BLD AUTO: 11.5 FL (ref 8.9–12.9)
POTASSIUM SERPL-SCNC: 4.6 MMOL/L (ref 3.5–5.1)
RBC # BLD AUTO: 3.01 M/UL (ref 4.1–5.7)
RBC MORPH BLD: ABNORMAL
SERVICE CMNT-IMP: ABNORMAL
SODIUM SERPL-SCNC: 131 MMOL/L (ref 136–145)
WBC # BLD AUTO: 18.2 K/UL (ref 4.1–11.1)

## 2022-12-22 PROCEDURE — 74011000258 HC RX REV CODE- 258: Performed by: STUDENT IN AN ORGANIZED HEALTH CARE EDUCATION/TRAINING PROGRAM

## 2022-12-22 PROCEDURE — 74011000250 HC RX REV CODE- 250: Performed by: STUDENT IN AN ORGANIZED HEALTH CARE EDUCATION/TRAINING PROGRAM

## 2022-12-22 PROCEDURE — 74011000258 HC RX REV CODE- 258: Performed by: NURSE PRACTITIONER

## 2022-12-22 PROCEDURE — 74011636637 HC RX REV CODE- 636/637: Performed by: STUDENT IN AN ORGANIZED HEALTH CARE EDUCATION/TRAINING PROGRAM

## 2022-12-22 PROCEDURE — 99233 SBSQ HOSP IP/OBS HIGH 50: CPT | Performed by: CLINICAL NURSE SPECIALIST

## 2022-12-22 PROCEDURE — 99356 PR PROLONGED SVC I/P OR OBS SETTING 1ST HOUR: CPT | Performed by: CLINICAL NURSE SPECIALIST

## 2022-12-22 PROCEDURE — 74011250636 HC RX REV CODE- 250/636: Performed by: COLON & RECTAL SURGERY

## 2022-12-22 PROCEDURE — 74011000250 HC RX REV CODE- 250: Performed by: COLON & RECTAL SURGERY

## 2022-12-22 PROCEDURE — 74011250637 HC RX REV CODE- 250/637: Performed by: NURSE PRACTITIONER

## 2022-12-22 PROCEDURE — 74011250636 HC RX REV CODE- 250/636: Performed by: STUDENT IN AN ORGANIZED HEALTH CARE EDUCATION/TRAINING PROGRAM

## 2022-12-22 PROCEDURE — 74011250637 HC RX REV CODE- 250/637: Performed by: STUDENT IN AN ORGANIZED HEALTH CARE EDUCATION/TRAINING PROGRAM

## 2022-12-22 PROCEDURE — 65270000029 HC RM PRIVATE

## 2022-12-22 PROCEDURE — 82962 GLUCOSE BLOOD TEST: CPT

## 2022-12-22 PROCEDURE — 74011000258 HC RX REV CODE- 258: Performed by: COLON & RECTAL SURGERY

## 2022-12-22 PROCEDURE — 85025 COMPLETE CBC W/AUTO DIFF WBC: CPT

## 2022-12-22 PROCEDURE — 83036 HEMOGLOBIN GLYCOSYLATED A1C: CPT

## 2022-12-22 PROCEDURE — 74011250636 HC RX REV CODE- 250/636: Performed by: NURSE PRACTITIONER

## 2022-12-22 PROCEDURE — 2709999900 HC NON-CHARGEABLE SUPPLY

## 2022-12-22 PROCEDURE — 77030037255 HC PCH OST FLX SENSURA COLO -A

## 2022-12-22 PROCEDURE — 36415 COLL VENOUS BLD VENIPUNCTURE: CPT

## 2022-12-22 PROCEDURE — 80048 BASIC METABOLIC PNL TOTAL CA: CPT

## 2022-12-22 RX ORDER — INSULIN LISPRO 100 [IU]/ML
12 INJECTION, SOLUTION INTRAVENOUS; SUBCUTANEOUS ONCE
Status: COMPLETED | OUTPATIENT
Start: 2022-12-22 | End: 2022-12-22

## 2022-12-22 RX ORDER — INSULIN LISPRO 100 [IU]/ML
6 INJECTION, SOLUTION INTRAVENOUS; SUBCUTANEOUS ONCE
Status: COMPLETED | OUTPATIENT
Start: 2022-12-23 | End: 2022-12-23

## 2022-12-22 RX ORDER — INSULIN LISPRO 100 [IU]/ML
14 INJECTION, SOLUTION INTRAVENOUS; SUBCUTANEOUS ONCE
Status: COMPLETED | OUTPATIENT
Start: 2022-12-22 | End: 2022-12-22

## 2022-12-22 RX ADMIN — KETOROLAC TROMETHAMINE 15 MG: 30 INJECTION, SOLUTION INTRAMUSCULAR; INTRAVENOUS at 12:08

## 2022-12-22 RX ADMIN — CLOTRIMAZOLE 10 MG: 10 LOZENGE ORAL; TOPICAL at 10:23

## 2022-12-22 RX ADMIN — ACETAMINOPHEN 650 MG: 325 TABLET ORAL at 04:41

## 2022-12-22 RX ADMIN — MORPHINE SULFATE 2 MG: 2 INJECTION, SOLUTION INTRAMUSCULAR; INTRAVENOUS at 00:44

## 2022-12-22 RX ADMIN — PIPERACILLIN AND TAZOBACTAM 3.38 G: 3; .375 INJECTION, POWDER, LYOPHILIZED, FOR SOLUTION INTRAVENOUS at 04:42

## 2022-12-22 RX ADMIN — Medication 1 CAPSULE: at 10:14

## 2022-12-22 RX ADMIN — KETOROLAC TROMETHAMINE 15 MG: 30 INJECTION, SOLUTION INTRAMUSCULAR; INTRAVENOUS at 07:09

## 2022-12-22 RX ADMIN — MORPHINE SULFATE 2 MG: 2 INJECTION, SOLUTION INTRAMUSCULAR; INTRAVENOUS at 21:34

## 2022-12-22 RX ADMIN — ACETAMINOPHEN 650 MG: 325 TABLET ORAL at 15:40

## 2022-12-22 RX ADMIN — ACETAMINOPHEN 650 MG: 325 TABLET ORAL at 10:14

## 2022-12-22 RX ADMIN — SODIUM CHLORIDE, POTASSIUM CHLORIDE, SODIUM LACTATE AND CALCIUM CHLORIDE 50 ML/HR: 600; 310; 30; 20 INJECTION, SOLUTION INTRAVENOUS at 10:36

## 2022-12-22 RX ADMIN — MORPHINE SULFATE 2 MG: 2 INJECTION, SOLUTION INTRAMUSCULAR; INTRAVENOUS at 15:40

## 2022-12-22 RX ADMIN — MORPHINE SULFATE 2 MG: 2 INJECTION, SOLUTION INTRAMUSCULAR; INTRAVENOUS at 04:41

## 2022-12-22 RX ADMIN — ENOXAPARIN SODIUM 40 MG: 100 INJECTION SUBCUTANEOUS at 10:14

## 2022-12-22 RX ADMIN — KETOROLAC TROMETHAMINE 15 MG: 30 INJECTION, SOLUTION INTRAMUSCULAR; INTRAVENOUS at 18:58

## 2022-12-22 RX ADMIN — CLOTRIMAZOLE 10 MG: 10 LOZENGE ORAL; TOPICAL at 21:34

## 2022-12-22 RX ADMIN — Medication 20 UNITS: at 12:09

## 2022-12-22 RX ADMIN — Medication 14 UNITS: at 18:58

## 2022-12-22 RX ADMIN — PIPERACILLIN AND TAZOBACTAM 3.38 G: 3; .375 INJECTION, POWDER, LYOPHILIZED, FOR SOLUTION INTRAVENOUS at 21:34

## 2022-12-22 RX ADMIN — Medication: at 18:59

## 2022-12-22 RX ADMIN — PIPERACILLIN AND TAZOBACTAM 3.38 G: 3; .375 INJECTION, POWDER, LYOPHILIZED, FOR SOLUTION INTRAVENOUS at 12:13

## 2022-12-22 RX ADMIN — ACETAMINOPHEN 650 MG: 325 TABLET ORAL at 21:34

## 2022-12-22 RX ADMIN — CLOTRIMAZOLE 10 MG: 10 LOZENGE ORAL; TOPICAL at 19:03

## 2022-12-22 RX ADMIN — CLOTRIMAZOLE 10 MG: 10 LOZENGE ORAL; TOPICAL at 07:08

## 2022-12-22 RX ADMIN — SODIUM CHLORIDE, PRESERVATIVE FREE 10 ML: 5 INJECTION INTRAVENOUS at 21:34

## 2022-12-22 RX ADMIN — SODIUM CHLORIDE 100 MG: 9 INJECTION, SOLUTION INTRAVENOUS at 10:14

## 2022-12-22 RX ADMIN — Medication 12 UNITS: at 12:09

## 2022-12-22 NOTE — PROGRESS NOTES
CRS Note    POD1 lap sigmoidectomy with end colostomy. Overall doing well. Pain controlled. Has been OOB. Cuellar removed and voiding on own. Ostomy functioning already.     Vitals reviewed - no fevers since surgery    NAD  Abd soft, nondistended, appropriately tender, drain ss, ostomy beefy red but viable somewhat retracted medial portion, stool and gas in appliance    Labs reviewe - hyperglycemia, leukocytosis, otherwise unremarakble    A/P:  65 y.o. M with diverticulitis now s/p lap sigmoidectomy with end colostomy 12/21 overall doing well.  - advance to GIS diet  - continue TPN today, may wean off tomorrow pending PO intake  - dc extra IVF  - OOB  - drain care  - ostomy care - appreciate education and care by ostomy team  - continue abx per ID  - diabetes team consult, appreciate input for hyperglycemia  - dvt ppx    Coretta Davila MD   [facility]  [phone]

## 2022-12-22 NOTE — ANESTHESIA POSTPROCEDURE EVALUATION
Procedure(s):  LAPAROSCOPIC SIGMOID COLECTOMY AND COLOSTOMY  CYSTOSCOPY WITH INSERTION BILATERAL URETERAL CATHETERS. general    Anesthesia Post Evaluation      Multimodal analgesia: multimodal analgesia used between 6 hours prior to anesthesia start to PACU discharge  Patient location during evaluation: bedside  Patient participation: complete - patient participated  Level of consciousness: awake  Pain score: 0  Pain management: satisfactory to patient  Airway patency: patent  Anesthetic complications: no  Cardiovascular status: acceptable and blood pressure returned to baseline  Respiratory status: acceptable  Hydration status: acceptable  Comments: I have evaluated the patient and meets criteria for discharge from PACU. Savannah Navarro DO. Post anesthesia nausea and vomiting:  none  Final Post Anesthesia Temperature Assessment:  Normothermia (36.0-37.5 degrees C)      INITIAL Post-op Vital signs:   Vitals Value Taken Time   /71 12/21/22 2230   Temp 37.1 °C (98.7 °F) 12/21/22 2145   Pulse 94 12/21/22 2236   Resp 22 12/21/22 2236   SpO2 96 % 12/21/22 2236   Vitals shown include unvalidated device data.

## 2022-12-22 NOTE — DIABETES MGMT
[facility]  DIABETES MANAGEMENT CONSULT    Consulted by  Kade Knutson PA-  for advanced nursing evaluation and care for inpatient blood glucose management. Evaluation and Action Plan   Avi Mcfarlane is a 65year old gentleman, with no previous history of diabetes, who is admitted with persistent inflammation of proximal sigmoid diverticulitis despite IV antibiotics now s/p laparoscopic sigmoid colectomy and colostomy and cystoscopy with insertion ob bilateral ureteral catheters. A1C is not available but initial glucose was elevated at 148 on admission. TPN was started on 12/15 with 360g dextrose in a 24h bag. BG the following 7 days on unchanged TPN concentrations were 136-212 without the use of correctional insulin or insulin added to TPN mix. He underwent his surgical intervention on 12/21 where he received a one time dose of 4mg decadron with an abrupt change of glucose, 265 post-op, 435 this morning now 537. Since glucose pattern was relatively stable on steady TPN concentration, hyperglycemia likely related to combination of decadron with high dextrose load in TPN. Decadron will impact glucose for aprox 24hrs, so will be in his system until around 6pm tonight. He also would likely benefit from a low dose of insulin in TPN for mild hyperglycemia seen prior to surgery. We can offset decadron and current TPN load with a one time dose of NPH. Please see further recommendations below. Inpatient BG goal is 140-80mg/dl. Management Rationale Action Plan   Medication   Basal needs To override decadron impact   (0.1 units/kg) and   TPN that is currently hanging   (1:20 ratio)) NPH 20 units once now    Corrective insulin Estimated that he will require 1 unit humalog to lower glucose by 35 points   Humalog 12 units x1 now for hyperglycemia.        Then normal sensitivity correction ACHS   Additional orders:  A1C add on    Add insulin in TPN: 1 unit for every 20g CHO in 24h bag: 18 units in tonight's bag. While experiencing hyperglycemia, please modify diet to be carb consistent. 60g CHO/meal and Glucerna. 4. Please re-check BG at 4pm      Addendum 1659:  after NPH and 12 units correction. Ate a grilled cheese sandwich at lunch, plans to eat dinner. Please give additional 14 units x1 now for correction. Initial Presentation   Cuong Lyle is a 65 y.o. male who presented to the ED 12/8/22 by recommendation of his Gastroenterologist for smoldering diverticulitis. He underwent a recent colonoscopy which demonstrated an abscess in the colon and started on oral antibiotics. He endorses fevers and moderate pain. CT: CT shows persistent inflammation of proximal sigmoid diverticulitis. HX:   Past Medical History:   Diagnosis Date    Hypercholesterolemia     Psoriasis     Diverticulitis    INITIAL DX:   Diverticulitis [K57.92]     Current Treatment     TX: IV antibiotics, Laparoscopic sigmoid colectomy and colostomy, cystoscopy with insertion ob bilateral ureteral catheters    Hospital Course   Clinical progress has been complicated by:   80/1: Admission. IV antibiotics. NPO   12/10: Clear liq diet  12/11: Full Liq diet  12/12: Persistent Fevers   12/15: No drainable abscess on repeat CT but ongoing inflammation. Will need surgery. Urology consulted for ureteral stents given inflammation and phlegmon involving psoas. PICC placed. TPN started  12/21: Laparoscopic sigmoid colectomy and colostomy, cystoscopy with insertion ob bilateral ureteral catheters  Diabetes History   No previous history of diabetes  No family hx of diabetes     Diabetes Medication History  Key Antihyperglycemic Medications       Patient is on no antihyperglycemic meds. Subjective   I don't have diabetes.      Objective   Physical exam  General Overweight [place] in post-op pain. Conversant and cooperative.  Tired  Neuro  Alert, oriented   Vital Signs Visit Vitals  /74 Pulse 81   Temp 97.5 °F (36.4 °C)   Resp 16   Ht 5' 9\" (1.753 m)   Wt 85.3 kg (188 lb)   SpO2 94%   BMI 27.76 kg/m²     Skin  Warm and dry. No acanthosis noted along neckline. Abdominal surgical site, colostomy  Heart   Regular rate and rhythm. No murmurs, rubs or gallops  Lungs  Clear to auscultation without rales or rhonchi  Extremities No foot wounds        Laboratory  Recent Labs     12/22/22  0448 12/21/22  0135 12/20/22  0045   * 163* 212*   AGAP 3* 4* 9   WBC 18.2* 14.5* 15.4*   CREA 0.88 0.79 0.80       Factors impacting BG management  Factor Dose Comments   Nutrition:  Standard meals  Tube feeding via OG/NG/PEG  TPN   D20  360g dextrose in TPN bag (running x7 days)    Drugs:    Steroids       Decadron 4mg x1 in OR at 1700       Impairs insulin action     Pain Toradol  Morphine    Infection Smoldering diverticulitis  IV antibiotics       Blood glucose pattern    Significant diabetes-related events over the past 24-72 hours  A1C not available  TPN has been infusing since 12/15 with D20, 360g Dextrose in 24h bag.   BG has ranged from 136-221 on BG checks  Potential to advance diet and wean TPN in the next day or two  BG this am 474, now 537    Assessment and Nursing Intervention   Nursing Diagnosis Risk for unstable blood glucose pattern   Nursing Intervention Domain 5250 Decision-making Support   Nursing Interventions Examined current inpatient diabetes/blood glucose control   Explored factors facilitating and impeding inpatient management  Explored corrective strategies with patient and responsible inpatient provider   Informed patient of rational for insulin strategy while hospitalized     Nursing Diagnosis 10876 Ineffective Health Management   Nursing Intervention Domain 5250 Decision-making Support   Nursing Interventions Identified diabetes self-management practices impeding diabetes control  Discussed diabetes survival skills related to  Healthy Plate eating plan; given handouts  Role of physical activity in improving insulin sensitivity and action  Procedure for blood glucose monitoring & options for low-cost products  Medications plan at discharge     Billing Code(s)   [x] 51222/17566    Before making these care recommendations, I personally reviewed the hospitalization record, including notes, laboratory & diagnostic data and current medications, and examined the patient at the bedside (circumstances permitting) before making care recommendations. More than fifty (50) percent of the time was spent in patient counseling and/or care coordination.   Total minutes: 65    KORY Patino  Diabetes Clinical Nurse Specialist  [facility]  Access via [facility]

## 2022-12-22 NOTE — PROGRESS NOTES
Hospitalist Brief Note    Event: Consult received for patient with hyperglycemia. This can best be managed with a consultation to the Diabetes Specialist team at [facility]. A consult was placed with them and defer management to their team. The hospitalist team is available for other medical questions but at this time if the only question is diabetes management then will defer to diabetes team and do not need two teams following for same issue.     Kade Knutson

## 2022-12-22 NOTE — OP NOTES
[facility]  OPERATIVE REPORT    Name:  Ada Snyder  MR#:  [id number]  :  1965  ACCOUNT #:  [de-identified]  DATE OF SERVICE:  2022    PREOPERATIVE DIAGNOSIS:  History of diverticulitis. POSTOPERATIVE DIAGNOSIS:  History of diverticulitis. PROCEDURE PERFORMED:  Cystoscopy and placement of bilateral ureteral catheters for purpose of ureteral identification. SURGEON:  Samuel Prabhakar MD.    ASSISTANT:  Staff. ANESTHESIA:  General.    COMPLICATIONS:  None. SPECIMENS REMOVED:  None. IMPLANTS:  Temporary bilateral 5-Ukrainian ureteral catheters. ESTIMATED BLOOD LOSS:  None. PROCEDURE:  The patient was induced under general anesthetic and placed in a dorsal lithotomy position. Preparation for a robotic laparoscopic colon resection for diverticulitis by Dr. Rebecca Kumar. We were asked to place ureteral catheters for purpose of ureteral identification. Following satisfactory anesthesia, the patient was placed in dorsal lithotomy position and his genitalia were prepared with Betadine-soaked solution and a proper time-out was conducted. Cystoscopy was initiated with a 21-panendoscope under camera control, demonstrating a normal urethra, sphincter, and prostate. The bladder shows no evidence of any erythema or urinary tract infection. There is no evidence of any fistulas, mass effect, or stones. Each ureteral orifice was noted to be orthotopic and had clear urine. Bilateral ureteral catheterizations were carried out uneventfully with 5-Ukrainian open-ended ureteral catheters. The catheters were placed up until resistance was met near the renal pelvis. I then pulled the cystoscope out, and I placed a 16-Ukrainian Silastic catheter. Then, the stents were secured to the catheter with clips. The catheter was connected to an adapter through which I could place the ureteral catheters into the apertures of the adapter. The procedure was tolerated well.     The case was then turned over to Dr. Tapan Ratliff who completed the operation and dictated separately.       Napoleon Leonard MD      [id number]  D:  12/21/2022 17:36  T:  12/22/2022 4:30  JOB #:  [id number]

## 2022-12-22 NOTE — BRIEF OP NOTE
Brief Postoperative Note    Patient: Sena Vargas  YOB: 1965  MRN: [id number]    Date of Procedure: 12/21/2022     Pre-Op Diagnosis: DIVERTICULITIS    Post-Op Diagnosis: Same as preoperative diagnosis. Procedure(s):  LAPAROSCOPIC SIGMOID COLECTOMY AND COLOSTOMY  CYSTOSCOPY WITH INSERTION BILATERAL URETERAL CATHETERS    Surgeon(s):  Salley Galeazzi, MD Bennett Mater, MD    Surgical Assistant: Surg Asst-1: Maxim VERA    Anesthesia: General     Estimated Blood Loss (mL): 570FS    Complications: None    Specimens:   ID Type Source Tests Collected by Time Destination   1 : sigmoid colon Fresh Sigmoid  Salley Galeazzi, MD 12/21/2022 1903 Pathology        Implants: * No implants in log *    Drains:   Sang-Gunter Drain 12/21/22 Left; Lower Abdomen (Active)       Findings: inflamed sigmoid colon with associated abscess    Electronically Signed by Tor Brito MD on 12/21/2022 at 9:09 PM

## 2022-12-22 NOTE — PROGRESS NOTES
TRANSFER - IN REPORT:    Verbal report received from Oz Noble RN(name) on Avi Mcfarlane  being received from PACU(unit) for routine post - op      Report consisted of patients Situation, Background, Assessment and   Recommendations(SBAR). Information from the following report(s) SBAR, Kardex, OR Summary, Procedure Summary, Intake/Output, MAR, and Recent Results was reviewed with the receiving nurse. Opportunity for questions and clarification was provided. Assessment completed upon patients arrival to unit and care assumed.

## 2022-12-22 NOTE — WOUND CARE
CWOCN Ostomy Progress Note:     Colostomy with sigmoid colectomy by Dr. Imtiaz Nolan 12/21/22    Treatments:  Pouch removed, stoma and peristomal skin cleaned and assessed, new pouch prepared and applied. Findings:  Current appliance: 2-piece convex with protective ring added  Stoma size: 30mm  Stoma color: red  Characteristics: edematous and at skin level with puffy tissue creating a deep bowl  Peristomal skin: intact  Abdomen: softly distended  Output: liquid brown / positive flatus  Other: midline incision sealed with Dermabond; MANAV in LLQ with deep red serosanguinous    Teaching/Subjects covered today:  Encouraged pt to review handout given to patient/family and ask questions regarding material on next ostomy nurse visit. - General anatomy and expectations of output  - Normal & abnormal stoma characteristics & changes over time  - Normal & abnormal peristomal characteristics   - Normal output from distal limb of colon  - When/how to clean stoma & peristomal skin  - When/how to empty pouch  - When/how to change appliance  - Shaving around stoma  - Dietary guidelines  - Where/how to obtain supplies  - Manipulated/practiced with clean pouch and pouch closure    His wife was at bedside and reports she is familiar with pouch changes from her nursing career. He was participatory but deferred to his wife for primary care of ostomy. Recommendations:  1. Empty appliance when 1/3 full and PRN. Encourage patient/family to notify nurse and  assist w/ pouch emptying to promote self-care. Change appliance twice weekly and PRN for leaking ASAP. DO NOT REINFORCE LEAKS to avoid skin breakdown. Transition of Care:  Ostomy nurse to return and continue teaching. Will likely need home health care for reinforcement and support after discharge. Home Health consult placed. Supplies given to patient with ordering information.     Kamari Lopez, RN, BSN, [facility]  Certified Wound, Ostomy, Continence Nurse  office: [phone]  Available via [facility]

## 2022-12-22 NOTE — PERIOP NOTES
TRANSFER - OUT REPORT:    Verbal report given to [facility] RN(name) on Tashi Farris  being transferred to [facility](unit) for routine post - op       Report consisted of patients Situation, Background, Assessment and   Recommendations(SBAR). Time Pre op antibiotic given:2123  Anesthesia Stop time: 2145    Information from the following report(s) SBAR, ED Summary, Procedure Summary, Intake/Output, Recent Results, and Cardiac Rhythm NSR  was reviewed with the receiving nurse. Opportunity for questions and clarification was provided. Is the patient on 02? YES       L/Min 2       Other 0    Is the patient on a monitor? NO    Is the nurse transporting with the patient? YES    Surgical Waiting Area notified of patient's transfer from PACU?  YES      The following personal items collected during your admission accompanied patient upon transfer:   Dental Appliance: Dental Appliances: None  Vision:    Hearing Aid:    Jewelry:    Clothing:    Other Valuables:    Valuables sent to safe:

## 2022-12-22 NOTE — OP NOTES
Operative Note    Patient: Sangita Louis  YOB: 1965  MRN: [id number]    Date of Procedure: 12/21/2022     Pre-Op Diagnosis: DIVERTICULITIS    Post-Op Diagnosis: Same as preoperative diagnosis. Procedure(s):  LAPAROSCOPIC SIGMOID COLECTOMY AND COLOSTOMY  CYSTOSCOPY WITH INSERTION BILATERAL URETERAL CATHETERS    Surgeon(s):  MD Cara Ventura MD    Surgical Assistant: Surg Asst-1: Winnie VERA    Anesthesia: General     Estimated Blood Loss (mL):  038LH    Complications: None    Specimens:   ID Type Source Tests Collected by Time Destination   1 : sigmoid colon Fresh Sigmoid  Eren Henson MD 12/21/2022 1903 Pathology        Implants: * No implants in log *    Drains:   Sang-Gunter Drain 12/21/22 Left; Lower Abdomen (Active)       Findings: inflamed sigmoid colon with associated abscess    Detailed Description of Procedure: The patient was taken back to the operating room and positioned in lithotomy position after induction of general anesthesia. Prior to my portion of the case, Dr. Edison Luong of urology performed cystoscopy and bilateral ureteral stents. After his portion of the case, the patient was prepped and draped in the usual fashion and a timeout was performed. To gain entry to the abdomen, a 7cm midline periumbilical incision was made and subcutaneous tissue dissected down to fascia which was incised in the midline. The peritoneum was grasped, elevated, and sharply incised to enter the abdomen. A gel port wound protector was placed and a 5mm trocar was passed through the cap and used to insufflate the abdomen. Next, a 12mm trocar was placed in the right lower quadrant and 5mm trocars were placed in the right upper and left lower quadrants under vision. The abdomen was then explored with the only abnormality being a very inflamed and hard proximal sigmoid colon densely adherent to the lateral abdominal wall.  Using a hand-assist technique, I began with lateral mobilization of the sigmoid and left colon. I began distal to the area of inflammation and began by incising the lateral attachments to the sigmoid colon. I then worked proximally up toward the area of inflammation. I was able to palpate the ureteral stent throughout this dissection to ensure the ureter was safe. Upon encountering the area of inflammation, finger fracture was used to free the colon from the abdominal wall. During this, I broke into an abscess cavity. Purulent material was suctioned out of this abscess cavity. Lateral mobilization was continued proximally along the left colon up toward the splenic flexure. Next, I switched to a medial approach and identified the CHRISTOPHER. Peritoneum overlying the pedicle was incised and the retroperitoneal plane entered. This dissection was carried laterally to meet my lateral dissection. After this, the vessels were skeletonized and divided high, again palpating the ureteral stent to ensure the ureter was protected. At this point, I chose my site of distal transection on the proximal rectum in a site where the rectum was soft and taenia splayed out. A mesenteric window was made underlying the rectal wall and the rectum was divided using a 60mm purple load EndoGIA stapler. The mesentery was then divided from this site back up proximally to the previously created mesenteric window. I worked a bit longer on mobilization of the left colon both medially and laterally to ensure there would be reach of the colon to the preoperatively marked ostomy site. After mobilization, I chose a site of proximal transection on the descending colon at soft, pink, healthy appearing colon and the colonic mesentery was divided up to this point. Next, the wound protector was uncapped and the transected bowel was passed out through this incision. The chosen site of proximal transection was identified and divided using a 60mm purple load EndoGIA stapler.  The resulting specimen was passed off to pathology as sigmoid colon. Next, a disc of skin was excised at the preoperatively marked stoma site and subcutaneous tissue dissected down to fascia which was incised in a cruciate manner. The underlying rectus was split bluntly and then posterior sheath and peritoneum incised. This was enlarged enough to accommodate 2 fingers. Upon attempting to pass the bowel out through the ostomy site, I was unhappy with the tension on the bowel, so the abdomen was re-insufflated and further mobilization was done until I was satisfied that the reach was appropriate. The bowel was then passed out of the ostomy site and left to be matured until after closing. A 19F rafia drain was placed along the left abdomen and passed out through the left lower quadrant trocar site and secured to the skin with a nylon stitch. We switched to the closing tray and gowns and gloves were changed. The fascia of the 12mm trocar site was closed with a 0 vicryl suture. The fascia of the midline incision was closed with running 0 PDS. All incisions were irrigated. Subcutaneous tissue of the midline was approximated with 3-0 vicryl suture, and the skin of all incisions was closed with 4-0 vicryl and dermabond. Lastly, the colostomy was matured. The staple line on the bowel was removed with cautery, and the colostomy was then matured in a brooked fashion using 2-0 chromic sutures. Digital exam confirmed that the ostomy was patent. An ostomy appliance was placed. The ureteral stents were removed and were in tact. The patient was returned to the supine position and was awakened, extubated, and transported to the PACU in good condition. All counts were correct.      Electronically Signed by Flako Titus MD on 12/21/2022 at 9:10 PM

## 2022-12-22 NOTE — PROGRESS NOTES
RUR: 9% Low     MINDA: Anticipated discharge home. Patient's wife will provide transport home once medically stable. Follow-up with PCP/specialist.      Primary Contact: Wife, Faye Mata, [phone]     12:30PM - CM reviewed chart. Per review and ID rounds, patient is POD#1 for laparoscopic sigmoidectomy with end colostomy. Patient receiving IV abx and TPN, may wean off tomorrow pending PO intake per colon and rectal surgery. 3:20PM - CM noted HH SN order for new colostomy. CM met with patient and wife at bedside to discuss. Referral sent to [facility], [facility], [facility], [facility] and [facility]. CM discussed with wife and patient that due to insurance, location and approaching holiday it might present as a barrier to securing HH. Wife expressed she is a nurse and feels comfortable with colostomy care if unable to secure [facility]. Discharge pending medical progress and final recommendations. CM will continue to follow as needed.      MARIE Duenas   [phone]

## 2022-12-22 NOTE — PROGRESS NOTES
4050: Page put out to MD RAGINI RICHTER about pt's BS being 474 this AM. Pt awake and alert, in no distress. 0630: Per orders, pt's chavez removed at this time. 2200: Hospitalist consult ordered. Perfect Serve sent to EVELIN Knutson. 0818: Diabetes management consult ordered.

## 2022-12-22 NOTE — PROGRESS NOTES
ID Progress Note  2022    Subjective:     T-max 102  \"I think my sugar is low\"    Review of Systems:            Symptom Y/N Comments   Symptom Y/N Comments   Fever/Chills n      Chest Pain n       Poor Appetite       Edema        Cough       Abdominal Pain y      Sputum       Joint Pain        SOB/FISH  n     Pruritis/Rash        Nausea/vomit  n     Tolerating PT/OT        Diarrhea  y  same as POA   Tolerating Diet        Constipation       Other           Could NOT obtain due to:       Objective:     Vitals: Visit Vitals  /74   Pulse 81   Temp 97.5 °F (36.4 °C)   Resp 16   Ht 5' 9\" (1.753 m)   Wt 85.3 kg (188 lb)   SpO2 94%   BMI 27.76 kg/m²          Tmax:  Temp (24hrs), Av.4 °F (36.9 °C), Min:97.5 °F (36.4 °C), Max:102 °F (38.9 °C)      PHYSICAL EXAM:  General: WD, WN. Alert, cooperative, no acute distress    EENT:  EOMI. Anicteric sclerae. MMM. Coated tongue  Resp:  CTA bilaterally, no wheezing or rales. No accessory muscle use  CV:  Regular  rhythm,  No edema  GI:  Soft, distended, colostomy in place, unable to appreciate Bowel sounds  Neurologic:  Alert and oriented X 3, normal speech,   Psych:   Good insight. Not anxious nor agitated  Skin:  No rashes. No jaundice    Labs:   Lab Results   Component Value Date/Time    WBC 18.2 (H) 2022 04:48 AM    HGB 9.3 (L) 2022 04:48 AM    HCT 28.3 (L) 2022 04:48 AM    PLATELET 735  04:48 AM    MCV 94.0 2022 04:48 AM     Lab Results   Component Value Date/Time    Sodium 131 (L) 2022 04:48 AM    Potassium 4.6 2022 04:48 AM    Chloride 105 2022 04:48 AM    CO2 23 2022 04:48 AM    Anion gap 3 (L) 2022 04:48 AM    Glucose 435 (H) 2022 04:48 AM    BUN 12 2022 04:48 AM    Creatinine 0.88 2022 04:48 AM    BUN/Creatinine ratio 14 2022 04:48 AM    Calcium 8.0 (L) 2022 04:48 AM    Bilirubin, total 0.3 2022 12:52 AM    Alk.  phosphatase 139 (H) 2022 12:52 AM Protein, total 7.4 12/17/2022 12:52 AM    Albumin 2.3 (L) 12/17/2022 12:52 AM    Globulin 5.1 (H) 12/17/2022 12:52 AM    A-G Ratio 0.5 (L) 12/17/2022 12:52 AM    ALT (SGPT) 22 12/17/2022 12:52 AM         Cultures:   Results       Procedure Component Value Units Date/Time    CULTURE, BLOOD, PAIRED [[id number]] Collected: 12/14/22 0901    Order Status: Completed Specimen: Blood Updated: 12/19/22 2114     Special Requests: NO SPECIAL REQUESTS        Culture result: NO GROWTH 5 DAYS       COVID-19 WITH INFLUENZA A/B [[id number]] Collected: 12/09/22 1030    Order Status: Completed Specimen: Nasopharyngeal Updated: 12/09/22 1330     SARS-CoV-2 by PCR Not detected        Comment: Not Detected results do not preclude SARS-CoV-2 infection and should not be used as the sole basis for patient management decisions. Results must be combined with clinical observations, patient history, and epidemiological information. Influenza A by PCR Not detected        Influenza B by PCR Not detected        Comment: Testing was performed using arsenio Nora SARS-CoV-2 and Influenza A/B nucleic acid assay. This test is a multiplex Real-Time Reverse Transcriptase Polymerase Chain Reaction (RT-PCR) based in vitro diagnostic test intended for the qualitative detection of nucleic acids from SARS-CoV-2, Influenza A, and Influenza B in nasopharyngeal and nasal swab specimens for use under the FDA's Emergency Use Authorization (EUA) only.        Fact sheet for Patients: FindDrives.pl  Fact sheet for Healthcare Providers: FindDrives.pl         CULTURE, BLOOD, PAIRED [[id number]] Collected: 12/08/22 1445    Order Status: Completed Specimen: Blood Updated: 12/14/22 5534     Special Requests: NO SPECIAL REQUESTS        Culture result: NO GROWTH 6 DAYS                 Impression:   Diverticulitis  S/p laparoscopic sigmoid colectomy and colostomy and cystoscopy with insertion of bilateral ureteral catheters (12/21)  Fever  - T-max 102, wbc 18.2; immediate post op - continue to monitor, pt is clinically stable at this time    Blood cx (12/8, 12/14) no growth     No intra-op cx (12/22)    CT of abd/pel (12/14) Persistent acute sigmoid colon diverticulitis with contained perforation. Mass like inflammatory changes and associated locules of gas and tiny pockets of fluid adjacent to the sigmoid colon, abutting the left iliopsoas muscle, are not significantly changed. No drainable collection is evident.     Oral candidiasis  - ordered Mycelex; pt requested tablet not elixir     Tobacco abuse  - continue with nicotine patch  Plan:    Continue with IV zosyn and IV Eraxis    Continue with Probiotic therapy   PICC line placed on 12/15 - TPN in progress   Adjust abx prn   CRS following;   Fever work up if temp >= 100.4 every 24 hours   Recommend influenza vaccine prior to discharge; pt agreed    Above plan of care discussed and agreed with Dr. Dewitte Najjar, NP

## 2022-12-23 VITALS
HEART RATE: 71 BPM | WEIGHT: 188 LBS | HEIGHT: 69 IN | SYSTOLIC BLOOD PRESSURE: 120 MMHG | TEMPERATURE: 97.4 F | RESPIRATION RATE: 16 BRPM | DIASTOLIC BLOOD PRESSURE: 74 MMHG | OXYGEN SATURATION: 95 % | BODY MASS INDEX: 27.85 KG/M2

## 2022-12-23 LAB
ANION GAP SERPL CALC-SCNC: 2 MMOL/L (ref 5–15)
BASOPHILS # BLD: 0 K/UL (ref 0–0.1)
BASOPHILS NFR BLD: 0 % (ref 0–1)
BUN SERPL-MCNC: 21 MG/DL (ref 6–20)
BUN/CREAT SERPL: 25 (ref 12–20)
CALCIUM SERPL-MCNC: 8.5 MG/DL (ref 8.5–10.1)
CHLORIDE SERPL-SCNC: 105 MMOL/L (ref 97–108)
CO2 SERPL-SCNC: 27 MMOL/L (ref 21–32)
CREAT SERPL-MCNC: 0.84 MG/DL (ref 0.7–1.3)
DIFFERENTIAL METHOD BLD: ABNORMAL
EOSINOPHIL # BLD: 0 K/UL (ref 0–0.4)
EOSINOPHIL NFR BLD: 0 % (ref 0–7)
ERYTHROCYTE [DISTWIDTH] IN BLOOD BY AUTOMATED COUNT: 12.4 % (ref 11.5–14.5)
GLUCOSE BLD STRIP.AUTO-MCNC: 205 MG/DL (ref 65–117)
GLUCOSE BLD STRIP.AUTO-MCNC: 399 MG/DL (ref 65–117)
GLUCOSE SERPL-MCNC: 385 MG/DL (ref 65–100)
HCT VFR BLD AUTO: 26.9 % (ref 36.6–50.3)
HGB BLD-MCNC: 8.6 G/DL (ref 12.1–17)
IMM GRANULOCYTES # BLD AUTO: 0.2 K/UL (ref 0–0.04)
IMM GRANULOCYTES NFR BLD AUTO: 1 % (ref 0–0.5)
LYMPHOCYTES # BLD: 0.6 K/UL (ref 0.8–3.5)
LYMPHOCYTES NFR BLD: 4 % (ref 12–49)
MCH RBC QN AUTO: 31.3 PG (ref 26–34)
MCHC RBC AUTO-ENTMCNC: 32 G/DL (ref 30–36.5)
MCV RBC AUTO: 97.8 FL (ref 80–99)
MONOCYTES # BLD: 0.8 K/UL (ref 0–1)
MONOCYTES NFR BLD: 5 % (ref 5–13)
NEUTS SEG # BLD: 13.9 K/UL (ref 1.8–8)
NEUTS SEG NFR BLD: 90 % (ref 32–75)
NRBC # BLD: 0 K/UL (ref 0–0.01)
NRBC BLD-RTO: 0 PER 100 WBC
PLATELET # BLD AUTO: 266 K/UL (ref 150–400)
PLATELET COMMENTS,PCOM: ABNORMAL
PMV BLD AUTO: 11.8 FL (ref 8.9–12.9)
POTASSIUM SERPL-SCNC: 4.2 MMOL/L (ref 3.5–5.1)
RBC # BLD AUTO: 2.75 M/UL (ref 4.1–5.7)
RBC MORPH BLD: ABNORMAL
SERVICE CMNT-IMP: ABNORMAL
SERVICE CMNT-IMP: ABNORMAL
SODIUM SERPL-SCNC: 134 MMOL/L (ref 136–145)
WBC # BLD AUTO: 15.5 K/UL (ref 4.1–11.1)

## 2022-12-23 PROCEDURE — 74011250637 HC RX REV CODE- 250/637: Performed by: STUDENT IN AN ORGANIZED HEALTH CARE EDUCATION/TRAINING PROGRAM

## 2022-12-23 PROCEDURE — 74011636637 HC RX REV CODE- 636/637: Performed by: STUDENT IN AN ORGANIZED HEALTH CARE EDUCATION/TRAINING PROGRAM

## 2022-12-23 PROCEDURE — 74011250636 HC RX REV CODE- 250/636: Performed by: STUDENT IN AN ORGANIZED HEALTH CARE EDUCATION/TRAINING PROGRAM

## 2022-12-23 PROCEDURE — 36415 COLL VENOUS BLD VENIPUNCTURE: CPT

## 2022-12-23 PROCEDURE — 99232 SBSQ HOSP IP/OBS MODERATE 35: CPT | Performed by: CLINICAL NURSE SPECIALIST

## 2022-12-23 PROCEDURE — 74011636637 HC RX REV CODE- 636/637: Performed by: INTERNAL MEDICINE

## 2022-12-23 PROCEDURE — 74011000258 HC RX REV CODE- 258: Performed by: COLON & RECTAL SURGERY

## 2022-12-23 PROCEDURE — 85025 COMPLETE CBC W/AUTO DIFF WBC: CPT

## 2022-12-23 PROCEDURE — 74011250637 HC RX REV CODE- 250/637: Performed by: NURSE PRACTITIONER

## 2022-12-23 PROCEDURE — 80048 BASIC METABOLIC PNL TOTAL CA: CPT

## 2022-12-23 PROCEDURE — 74011250636 HC RX REV CODE- 250/636: Performed by: COLON & RECTAL SURGERY

## 2022-12-23 PROCEDURE — 82962 GLUCOSE BLOOD TEST: CPT

## 2022-12-23 RX ORDER — METRONIDAZOLE 250 MG/1
500 TABLET ORAL 3 TIMES DAILY
Status: DISCONTINUED | OUTPATIENT
Start: 2022-12-23 | End: 2022-12-23 | Stop reason: HOSPADM

## 2022-12-23 RX ORDER — INSULIN LISPRO 100 [IU]/ML
12 INJECTION, SOLUTION INTRAVENOUS; SUBCUTANEOUS ONCE
Status: COMPLETED | OUTPATIENT
Start: 2022-12-23 | End: 2022-12-23

## 2022-12-23 RX ORDER — CIPROFLOXACIN 500 MG/1
500 TABLET ORAL EVERY 12 HOURS
Status: DISCONTINUED | OUTPATIENT
Start: 2022-12-23 | End: 2022-12-23 | Stop reason: HOSPADM

## 2022-12-23 RX ORDER — CIPROFLOXACIN 500 MG/1
500 TABLET ORAL EVERY 12 HOURS
Qty: 27 TABLET | Refills: 0 | Status: SHIPPED | OUTPATIENT
Start: 2022-12-23 | End: 2023-01-06

## 2022-12-23 RX ORDER — METRONIDAZOLE 500 MG/1
500 TABLET ORAL 3 TIMES DAILY
Qty: 41 TABLET | Refills: 0 | Status: SHIPPED | OUTPATIENT
Start: 2022-12-23 | End: 2023-01-06

## 2022-12-23 RX ADMIN — KETOROLAC TROMETHAMINE 15 MG: 30 INJECTION, SOLUTION INTRAMUSCULAR; INTRAVENOUS at 00:17

## 2022-12-23 RX ADMIN — Medication 7 UNITS: at 08:35

## 2022-12-23 RX ADMIN — Medication 1 CAPSULE: at 09:11

## 2022-12-23 RX ADMIN — ACETAMINOPHEN 650 MG: 325 TABLET ORAL at 05:54

## 2022-12-23 RX ADMIN — CLOTRIMAZOLE 10 MG: 10 LOZENGE ORAL; TOPICAL at 06:06

## 2022-12-23 RX ADMIN — METRONIDAZOLE 500 MG: 250 TABLET ORAL at 09:11

## 2022-12-23 RX ADMIN — Medication 12 UNITS: at 09:11

## 2022-12-23 RX ADMIN — PIPERACILLIN AND TAZOBACTAM 3.38 G: 3; .375 INJECTION, POWDER, LYOPHILIZED, FOR SOLUTION INTRAVENOUS at 05:55

## 2022-12-23 RX ADMIN — MORPHINE SULFATE 2 MG: 2 INJECTION, SOLUTION INTRAMUSCULAR; INTRAVENOUS at 00:59

## 2022-12-23 RX ADMIN — Medication 3 UNITS: at 12:22

## 2022-12-23 RX ADMIN — KETOROLAC TROMETHAMINE 15 MG: 30 INJECTION, SOLUTION INTRAMUSCULAR; INTRAVENOUS at 05:54

## 2022-12-23 RX ADMIN — Medication 6 UNITS: at 00:17

## 2022-12-23 RX ADMIN — CIPROFLOXACIN 500 MG: 500 TABLET, FILM COATED ORAL at 09:11

## 2022-12-23 NOTE — DIABETES MGMT
[facility]  [facility]  DIABETES MANAGEMENT CONSULT    Consulted by  Kade Knutson PA-  for advanced nursing evaluation and care for inpatient blood glucose management. Evaluation and Action Plan   Sena Vargas is a 65year old gentleman, with no previous history of diabetes, who is admitted with persistent inflammation of proximal sigmoid diverticulitis despite IV antibiotics now s/p laparoscopic sigmoid colectomy and colostomy and cystoscopy with insertion ob bilateral ureteral catheters. A1C obtained yesterday was elevated at 6.2% indicating a new diagnosis of pre-diabetes. TPN was started on 12/15 with 360g dextrose in a 24h bag. BG the following 7 days on unchanged TPN concentrations were 136-212 without the use of correctional insulin or insulin added to TPN mix. He underwent his surgical intervention on 12/21 where he received a one time dose of 4mg decadron with an abrupt change of glucose max 537. Since glucose pattern was relatively stable on steady TPN concentration, hyperglycemia likely related to combination of decadron with high dextrose load in TPN. He was given 26 units correctional humalog yesterday, additional 20 units NPH. BG remained elevated despite correction but now improved with discontinuation of TPN this morning. Hyperglycemia is expected to resolve and he will have prompt follow-up upon hospital discharge. Inpatient BG goal is 140-80mg/dl. Management Rationale Action Plan   Medication   Corrective insulin Estimated that he will require 1 unit humalog to lower glucose by 35 points   Normal sensitivity correction ACHS   Additional orders:  Continue carb consistent diet. 60g CHO/meal and Glucerna. Discharge Recommendations: Will need a FUV with PCP within 1-2 weeks after hospital discharge for ongoing surveillance of A1C/pre-diabetes    Local diabetes prevention programs discussed and provided to patient.   Patient understands he will have to self-enroll- would recommend once he is stable and recovered. Thank you for including us in his care. Initial Presentation   Adrián Carranza is a 65 y.o. male who presented to the ED 12/8/22 by recommendation of his Gastroenterologist for smoldering diverticulitis. He underwent a recent colonoscopy which demonstrated an abscess in the colon and started on oral antibiotics. He endorses fevers and moderate pain. CT: CT shows persistent inflammation of proximal sigmoid diverticulitis. HX:   Past Medical History:   Diagnosis Date    Hypercholesterolemia     Psoriasis     Diverticulitis    INITIAL DX:   Diverticulitis [K57.92]     Current Treatment     TX: IV antibiotics, Laparoscopic sigmoid colectomy and colostomy, cystoscopy with insertion ob bilateral ureteral catheters    Hospital Course   Clinical progress has been complicated by:   97/5: Admission. IV antibiotics. NPO   12/10: Clear liq diet  12/11: Full Liq diet  12/12: Persistent Fevers   12/15: No drainable abscess on repeat CT but ongoing inflammation. Will need surgery. Urology consulted for ureteral stents given inflammation and phlegmon involving psoas. PICC placed. TPN started  12/21: Laparoscopic sigmoid colectomy and colostomy, cystoscopy with insertion ob bilateral ureteral catheters  Diabetes History   No previous history of diabetes  No family hx of diabetes     Diabetes Medication History  Key Antihyperglycemic Medications       Patient is on no antihyperglycemic meds. Subjective   I want to go home.      Objective   Physical exam  General Overweight [place] in post-op pain. Conversant and cooperative. Tired  Neuro  Alert, oriented   Vital Signs Visit Vitals  /74   Pulse 71   Temp 97.4 °F (36.3 °C)   Resp 16   Ht 5' 9\" (1.753 m)   Wt 85.3 kg (188 lb)   SpO2 95%   BMI 27.76 kg/m²     Skin  Warm and dry. No acanthosis noted along neckline. Abdominal surgical site, colostomy  Heart   Regular rate and rhythm. No murmurs, rubs or gallops  Lungs  Clear to auscultation without rales or rhonchi  Extremities No foot wounds        Laboratory  Recent Labs     12/23/22  0058 12/22/22  0448 12/21/22  0135   * 435* 163*   AGAP 2* 3* 4*   WBC 15.5* 18.2* 14.5*   CREA 0.84 0.88 0.79         Factors impacting BG management  Factor Dose Comments   Nutrition:  Standard meals  Tube feeding via OG/NG/PEG  TPN   D20  360g dextrose in TPN bag (running x7 days)  D/C at 8am    Drugs:    Steroids       Decadron 4mg x1 in OR at 1700 12/21       Impairs insulin action     Pain Toradol    Infection Smoldering diverticulitis  IV antibiotics       Blood glucose pattern      Significant diabetes-related events over the past 24-72 hours  A1C 6.2%  TPN D/C  20 NPH, 26 correction     Assessment and Nursing Intervention   Nursing Diagnosis Risk for unstable blood glucose pattern   Nursing Intervention Domain 5250 Decision-making Support   Nursing Interventions Examined current inpatient diabetes/blood glucose control   Explored factors facilitating and impeding inpatient management  Explored corrective strategies with patient and responsible inpatient provider   Informed patient of rational for insulin strategy while hospitalized     Nursing Diagnosis 58466 Ineffective Health Management   Nursing Intervention Domain 5250 Decision-making Support   Nursing Interventions Identified diabetes self-management practices impeding diabetes control  Discussed diabetes survival skills related to  Healthy Plate eating plan; given handouts  Role of physical activity in improving insulin sensitivity and action  Procedure for blood glucose monitoring & options for low-cost products  Medications plan at discharge     Billing Code(s)   [x] 48586    Before making these care recommendations, I personally reviewed the hospitalization record, including notes, laboratory & diagnostic data and current medications, and examined the patient at the bedside (circumstances permitting) before making care recommendations. More than fifty (50) percent of the time was spent in patient counseling and/or care coordination.   Total minutes: 25    KORY Tan  Diabetes Clinical Nurse Specialist  [facility]  Access via [employer]

## 2022-12-23 NOTE — PROGRESS NOTES
RUR: 9% Low     MINDA: Home health SN with [facility] [place] (p: [phone]). Patient's wife will provide transport home once medically stable. Follow-up with PCP/specialist.      Primary Contact: Wife, Zoe Erazo, [phone]    CM noted DC order. Patient will DC home with [facility] [place] [facility]. AVS updated. CM provided update to patient at bedside and wife via phone. No further CM needs.      Jorden Ferreira, MARIE   [phone]

## 2022-12-23 NOTE — WOUND CARE
[facility]GA Ostomy Progress Note:     Postoperative visit. Surgery: Colostomy with sigmoid colectomy  Date of Surgery: 12.21.22      Surgeon: Dr. Vijaya Briceno  Patient reports that the pouches were leaking and changed a couple of times. Ostomy Assessment:  Stoma appearance: red, moist in a deep crevice  Mucocutaneous Junction: intact  Peristomal skin: intact  Wound: midline incision with dermabond  Output: liquid brown  Current appliance: 2 piece convex with ring and belt    Treatments:  Pouch removed, stoma and peristomal skin cleaned and assessed, new pouch prepared and applied. Teaching/Subjects covered today:  Encouraged pt to review handout given to patient/family and ask questions regarding material on next ostomy nurse visit. - When/how to change appliance    Recommendations:  1. Empty appliance when 1/3 full and PRN. Encourage patient/family to notify nurse and  assist w/ pouch emptying to promote self-care. Change appliance twice weekly and PRN for leaking ASAP. DO NOT REINFORCE LEAKS to avoid skin breakdown. Transition of Care:  Patient is going home with [facility].     Herve Huizar, DKN RN [facility]  Available on [employer]  Office [phone]

## 2022-12-23 NOTE — PROGRESS NOTES
DC paperwork reviewed, no questions or concerns. X2 hard scripts provided. PICC and MANAV drain removed. Wound care/ostomy teaching at bedside. Aware if he needs pain medications to use OTC tylenol and if not effective to call back the [facility] office. Supplies provided.  Wife at bedside

## 2022-12-23 NOTE — PROGRESS NOTES
CRS Note    POD2 lap sigmoidectomy with end colostomy. Feels good. Pain controlled. Tolerating diet. Ambulating. Voiding. Ostomy working. Vitals reviewed - no fevers since surgery    NAD  Abd soft, nondistended, appropriately tender, drain ss, ostomy beefy red but viable somewhat retracted medial portion, stool and gas in appliance    Labs reviewed - WBC improving, BG overall better    A/P:  65 y.o. M with diverticulitis now s/p lap sigmoidectomy with end colostomy 12/21 overall doing well. - likely home today  - prior to dc home needs:  - abx plan from ID  - second teaching session with wound ostomy  - wean and dc TPN  - dc rafia drain  - dc PICC unless ID feels he needs home IV abx  - case management for [place]     - will get repeat CBC early next week to ensure WBC normalized  - will follow up with me in office in 2 weeks.  Return precautions given    Narciso Inman MD   [facility]  [phone]

## 2022-12-23 NOTE — DISCHARGE SUMMARY
Admit date: 12/8/2022   Admitting Provider: Monie Montalvo MD    Discharge date: 12/23/2022  Discharging Provider: Lashay Alvarez MD      * Admission Diagnoses: Diverticulitis [K57.92]    * Discharge Diagnoses:    Hospital Problems as of 12/23/2022 Never Reviewed            Codes Class Noted - Resolved POA    Diverticulitis ICD-10-CM: F09.25  ICD-9-CM: 562.11  11/23/2022 - Present Unknown           * Hospital Course: Admitted 12/8 with diverticulitis. Attempted conservative management with bowel rest and IV antibiotics but had ongoing fevers and leukocytosis. Underwent lap sigmoid colectomy with end colostomy 12/21. Recovered well postoperatively and appropriate for discharge home 12/23. * Procedures:   Procedure(s):  LAPAROSCOPIC SIGMOID COLECTOMY AND COLOSTOMY  CYSTOSCOPY WITH INSERTION BILATERAL URETERAL CATHETERS      Consults: ID      Discharge Exam:  Visit Vitals  /74   Pulse 71   Temp 97.4 °F (36.3 °C)   Resp 16   Ht 5' 9\" (1.753 m)   Wt 85.3 kg (188 lb)   SpO2 95%   BMI 27.76 kg/m²     General:  Alert, cooperative, no distress, appears stated age. Head:  Normocephalic, without obvious abnormality, atraumatic. Eyes:  Conjunctivae/corneas clear. PERRL, EOMs intact. Fundi benign   Ears:  Normal TMs and external ear canals both ears. Nose: Nares normal. Septum midline. Mucosa normal. No drainage or sinus tenderness. Throat: Lips, mucosa, and tongue normal. Teeth and gums normal.   Neck: Supple, symmetrical, trachea midline, no adenopathy, thyroid: no enlargement/tenderness/nodules, no carotid bruit and no JVD. Back:   Symmetric, no curvature. ROM normal. No CVA tenderness. Lungs:   Clear to auscultation bilaterally. Chest wall:  No tenderness or deformity. Heart:  Regular rate and rhythm, S1, S2 normal, no murmur, click, rub or gallop.    Abdomen:   Soft, nondistended, apporpriately tender, incisions c/d/I with dermabond in place and minimal surrounding bruising, ostomy beefy but viable with output in bag   Extremities: Extremities normal, atraumatic, no cyanosis or edema. Pulses: 2+ and symmetric all extremities. Skin: Skin color, texture, turgor normal. No rashes or lesions   Lymph nodes: Cervical, supraclavicular, and axillary nodes normal.   Neurologic: CNII-XII intact. Normal strength, sensation and reflexes throughout. * Discharge Condition: good  * Disposition: Home    Discharge Medications:  Current Discharge Medication List          * Follow-up Care/Patient Instructions:   Activity: No lifting, Driving, or Strenuous exercise for 2 weeks  Diet: low fiber diet for 2 weeks  Wound Care: ok to shower, no tub bathing    Will follow up with me in 2 weeks      Signed:  Stella Sánchez MD  12/23/2022  6:28 AM

## 2022-12-23 NOTE — DISCHARGE INSTRUCTIONS
[facility] Discharge Instructions for Colon Surgery Patients    Diagnosis: diverticulitis  Restricted fiber diet. Do not drive for 2 weeks or until after your next doctors appointment. May take a shower. No soaking in a tub for 2 weeks  No lifting any objects weighing more than 15 pounds. Do not do any housework, such as vacuuming, scrubbing, etc for at least a month. When you get tired during the day, take naps, as you need your rest.  May walk as desired. May go up and down stairs. Take pain medication as prescribed: (NO DRIVING WHILE ON PAIN MEDICATIONS). oxycodone EVERY 4-6 HOURS AS NEEDED. Other Medications: alternate tylenol and ibuprofen; take antibiotics as prescribed if needed   See me in the office in 14 days. My office will call you to schedule. IF SURGERY INVOLVED AN OSTOMY BAG, PLEASE BRING YOUR SUPPLIES TO YOUR 1ST VISIT! Call if you have any questions or problems after office hours.     Moses Guzman MD   [facility]  [phone]

## 2022-12-23 NOTE — PROGRESS NOTES
ID Progress Note  2022    Subjective:     Feeling pretty good    Review of Systems:            Symptom Y/N Comments   Symptom Y/N Comments   Fever/Chills n      Chest Pain n       Poor Appetite       Edema        Cough       Abdominal Pain n      Sputum       Joint Pain        SOB/FISH  n     Pruritis/Rash        Nausea/vomit  n     Tolerating PT/OT        Diarrhea       Tolerating Diet        Constipation       Other           Could NOT obtain due to:       Objective:     Vitals: Visit Vitals  /74   Pulse 71   Temp 97.4 °F (36.3 °C)   Resp 16   Ht 5' 9\" (1.753 m)   Wt 85.3 kg (188 lb)   SpO2 95%   BMI 27.76 kg/m²          Tmax:  Temp (24hrs), Av.4 °F (36.3 °C), Min:97.4 °F (36.3 °C), Max:97.5 °F (36.4 °C)      PHYSICAL EXAM:  General: WD, WN. Alert, cooperative, no acute distress    EENT:  EOMI. Anicteric sclerae. MMM. Coated tongue  Resp:  CTA bilaterally, no wheezing or rales. No accessory muscle use  CV:  Regular  rhythm,  No edema  GI:  Soft, distended, colostomy in place, + Bowel sounds  Neurologic:  Alert and oriented X 3, normal speech,   Psych:   Good insight. Not anxious nor agitated  Skin:  No rashes. No jaundice    Labs:   Lab Results   Component Value Date/Time    WBC 15.5 (H) 2022 12:58 AM    HGB 8.6 (L) 2022 12:58 AM    HCT 26.9 (L) 2022 12:58 AM    PLATELET 148 2306 12:58 AM    MCV 97.8 2022 12:58 AM     Lab Results   Component Value Date/Time    Sodium 134 (L) 2022 12:58 AM    Potassium 4.2 2022 12:58 AM    Chloride 105 2022 12:58 AM    CO2 27 2022 12:58 AM    Anion gap 2 (L) 2022 12:58 AM    Glucose 385 (H) 2022 12:58 AM    BUN 21 (H) 2022 12:58 AM    Creatinine 0.84 2022 12:58 AM    BUN/Creatinine ratio 25 (H) 2022 12:58 AM    Calcium 8.5 2022 12:58 AM    Bilirubin, total 0.3 2022 12:52 AM    Alk.  phosphatase 139 (H) 2022 12:52 AM    Protein, total 7.4 2022 12:52 AM Albumin 2.3 (L) 12/17/2022 12:52 AM    Globulin 5.1 (H) 12/17/2022 12:52 AM    A-G Ratio 0.5 (L) 12/17/2022 12:52 AM    ALT (SGPT) 22 12/17/2022 12:52 AM         Cultures:   Results       Procedure Component Value Units Date/Time    CULTURE, BLOOD, PAIRED [[id number]] Collected: 12/14/22 0901    Order Status: Completed Specimen: Blood Updated: 12/19/22 2114     Special Requests: NO SPECIAL REQUESTS        Culture result: NO GROWTH 5 DAYS       COVID-19 WITH INFLUENZA A/B [[id number]] Collected: 12/09/22 1030    Order Status: Completed Specimen: Nasopharyngeal Updated: 12/09/22 1330     SARS-CoV-2 by PCR Not detected        Comment: Not Detected results do not preclude SARS-CoV-2 infection and should not be used as the sole basis for patient management decisions. Results must be combined with clinical observations, patient history, and epidemiological information. Influenza A by PCR Not detected        Influenza B by PCR Not detected        Comment: Testing was performed using arsenio Nora SARS-CoV-2 and Influenza A/B nucleic acid assay. This test is a multiplex Real-Time Reverse Transcriptase Polymerase Chain Reaction (RT-PCR) based in vitro diagnostic test intended for the qualitative detection of nucleic acids from SARS-CoV-2, Influenza A, and Influenza B in nasopharyngeal and nasal swab specimens for use under the FDA's Emergency Use Authorization (EUA) only.        Fact sheet for Patients: FindD[employer]  Fact sheet for Healthcare Providers: [employer]                   Impression:   Diverticulitis  S/p laparoscopic sigmoid colectomy and colostomy and cystoscopy with insertion of bilateral ureteral catheters (12/21)  Fever  - afebrile, wbc 18.2->15.5; immediate post op - continue to monitor, pt is clinically stable at this time    Blood cx (12/8, 12/14) no growth     No intra-op cx (12/22)    CT of abd/pel (12/14) Persistent acute sigmoid colon diverticulitis with contained perforation. Mass like inflammatory changes and associated locules of gas and tiny pockets of fluid adjacent to the sigmoid colon, abutting the left iliopsoas muscle, are not significantly changed. No drainable collection is evident. Oral candidiasis  - ordered Mycelex; pt requested tablet not elixir     Tobacco abuse  - continue with nicotine patch  Plan:    Received IV zosyn and IV Eraxis during hospitalization. Recommend to complete 2 weeks of PO cipro and flagyl    Continue with Probiotic therapy   PICC line placed on 12/15; please remove PICC line prior to discharge   CRS following; follow up within 2 weeks. Recommend influenza vaccine prior to discharge; pt agreed      Above plan of care discussed and agreed with Dr. Talon Villegas      ID team signing off. Please contact us with any questions.      Ernie Waldron, NP

## 2022-12-28 NOTE — OP NOTES
[address]  OPERATIVE REPORT    Name:  Fco Russell  MR#:  [id number]  :  1965  ACCOUNT #:  [de-identified]  DATE OF SERVICE:  2022      AMENDED DOCUMENT - corrected facility name; 2022; [facility]    PREOPERATIVE DIAGNOSIS:  History of diverticulitis. POSTOPERATIVE DIAGNOSIS:  History of diverticulitis. PROCEDURE PERFORMED:  Cystoscopy and placement of bilateral ureteral catheters for purpose of ureteral identification. SURGEON:  Benja Parikh. Kisha Garcia MD.    ASSISTANT:  Staff. ANESTHESIA:  General.    COMPLICATIONS:  None. SPECIMENS REMOVED:  None. IMPLANTS:  Temporary bilateral 5-Montserratian ureteral catheters. ESTIMATED BLOOD LOSS:  None. PROCEDURE:  The patient was induced under general anesthetic and placed in a dorsal lithotomy position. Preparation for a robotic laparoscopic colon resection for diverticulitis by Dr. James Suazo. We were asked to place ureteral catheters for purpose of ureteral identification. Following satisfactory anesthesia, the patient was placed in dorsal lithotomy position and his genitalia were prepared with Betadine-soaked solution and a proper time-out was conducted. Cystoscopy was initiated with a 21-panendoscope under camera control, demonstrating a normal urethra, sphincter, and prostate. The bladder shows no evidence of any erythema or urinary tract infection. There is no evidence of any fistulas, mass effect, or stones. Each ureteral orifice was noted to be orthotopic and had clear urine. Bilateral ureteral catheterizations were carried out uneventfully with 5-Montserratian open-ended ureteral catheters. The catheters were placed up until resistance was met near the renal pelvis. I then pulled the cystoscope out, and I placed a 16-Montserratian Silastic catheter. Then, the stents were secured to the catheter with clips.   The catheter was connected to an adapter through which I could place the ureteral catheters into the apertures of the adapter. The procedure was tolerated well. The case was then turned over to Dr. César Allen who completed the operation and dictated separately.       Nitin Lnage MD      [id number]  D:  12/21/2022 17:36  T:  12/22/2022 4:30  JOB #:  [id number]

## 2023-01-12 NOTE — PROGRESS NOTES
Harinder Estes is a 65 y.o. male here for new patient appt for perforated colon cancer. Referred by Colon and Rectal Specialist  12/21/22 Procedure(s):  LAPAROSCOPIC SIGMOID COLECTOMY AND COLOSTOMY  CYSTOSCOPY WITH INSERTION BILATERAL URETERAL CATHETERS  Pt here with wife. Pt doing well. Getting better every day. 1. Have you been to the ER, urgent care clinic since your last visit? Hospitalized since your last visit? New Pt    2. Have you seen or consulted any other health care providers outside of the [address] [place] [address] since your last visit? Include any pap smears or colon screening.   New Pt

## 2023-01-13 ENCOUNTER — DOCUMENTATION ONLY (OUTPATIENT)
Dept: ONCOLOGY | Age: 58
End: 2023-01-13

## 2023-01-13 ENCOUNTER — OFFICE VISIT (OUTPATIENT)
Dept: ONCOLOGY | Age: 58
End: 2023-01-13
Payer: COMMERCIAL

## 2023-01-13 VITALS
OXYGEN SATURATION: 98 % | BODY MASS INDEX: 28.05 KG/M2 | WEIGHT: 189.4 LBS | HEIGHT: 69 IN | DIASTOLIC BLOOD PRESSURE: 80 MMHG | SYSTOLIC BLOOD PRESSURE: 133 MMHG | TEMPERATURE: 98.2 F | HEART RATE: 91 BPM

## 2023-01-13 DIAGNOSIS — C18.7 CANCER OF SIGMOID COLON (HCC): Primary | ICD-10-CM

## 2023-01-13 PROBLEM — C18.9 COLON CANCER (HCC): Status: ACTIVE | Noted: 2023-01-13

## 2023-01-13 RX ORDER — CAPECITABINE 150 MG/1
300 TABLET, FILM COATED ORAL 2 TIMES DAILY
Qty: 56 TABLET | Refills: 0 | Status: SHIPPED | OUTPATIENT
Start: 2023-01-13 | End: 2023-01-27

## 2023-01-13 RX ORDER — CAPECITABINE 500 MG/1
1500 TABLET, FILM COATED ORAL 2 TIMES DAILY
Qty: 84 TABLET | Refills: 0 | Status: SHIPPED | OUTPATIENT
Start: 2023-01-13 | End: 2023-01-27

## 2023-01-13 NOTE — PROGRESS NOTES
2001 [facility] [address], [address] [facility], [address] [place] [address]  [phone]      Oncology Note        Patient: Red Louis MRN: [id number]  SSN: [id number]    YOB: 1965  Age: 65 y.o. Sex: male      Subjective:      Red Louis is a 65 y.o. male who I am seeing for a new diagnosis of colon carcinoma. He was noted to have diverticulitis which did not respond to antibiotics treatment. He underwent a left hemicolectomy. The pathology reveals colon adenocarcinoma. He comes in to discuss the next steps. 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 He comes in with his wife to discuss the next steps.        Review of Systems:    Constitutional: negative  Eyes: negative  Ears, Nose, Mouth, Throat, and Face: negative  Respiratory: negative  Cardiovascular: negative  Gastrointestinal: negative  Genitourinary:negative  Integument/Breast: negative  Hematologic/Lymphatic: negative  Musculoskeletal:negative  Neurological: negative      Past Medical History:   Diagnosis Date    Cancer ([facility] [address] 2023    colon    Hypercholesterolemia     Psoriasis      Past Surgical History:   Procedure Laterality Date    HX OTHER SURGICAL      Laparoscopic inguinal hernia repair      Family History   Problem Relation Age of Onset    Crohn's Disease Sister     Cancer Paternal Grandfather         colon     Social History     Tobacco Use    Smoking status: Every Day     Packs/day: 0.50     Types: Cigarettes    Smokeless tobacco: Never   Substance Use Topics    Alcohol use: Yes      Prior to Admission medications    Not on File              No Known Allergies        Objective:     Vitals:    01/13/23 1434   BP: 133/80   Pulse: 91   Temp: 98.2 °F (36.8 °C)   SpO2: 98%   Weight: 189 lb 6.4 oz (85.9 kg)   Height: 5' 9\" (1.753 m)            Physical Exam:    GENERAL: alert, cooperative, no distress, appears stated age  EYE: conjunctivae/corneas clear. PERRL, EOM's intact  LYMPHATIC: Cervical, supraclavicular, and axillary nodes normal.   THROAT & NECK: normal and no erythema or exudates noted. LUNG: clear to auscultation bilaterally  HEART: regular rate and rhythm, S1, S2 normal, no murmur, click, rub or gallop  ABDOMEN: soft, non-tender. Bowel sounds normal. No masses,  no organomegaly  EXTREMITIES:  extremities normal, atraumatic, no cyanosis or edema  SKIN: no rash or abnormalities  NEUROLOGIC: AOx3. Gait normal. Reflexes and motor strength normal and symmetric. Cranial nerves 2-12 and sensation grossly intact. Lab Results   Component Value Date/Time    WBC 15.5 (H) 12/23/2022 12:58 AM    HGB 8.6 (L) 12/23/2022 12:58 AM    HCT 26.9 (L) 12/23/2022 12:58 AM    PLATELET 557 98/05/9191 12:58 AM    MCV 97.8 12/23/2022 12:58 AM         Lab Results   Component Value Date/Time    Sodium 134 (L) 12/23/2022 12:58 AM    Potassium 4.2 12/23/2022 12:58 AM    Chloride 105 12/23/2022 12:58 AM    CO2 27 12/23/2022 12:58 AM    Anion gap 2 (L) 12/23/2022 12:58 AM    Glucose 385 (H) 12/23/2022 12:58 AM    BUN 21 (H) 12/23/2022 12:58 AM    Creatinine 0.84 12/23/2022 12:58 AM    BUN/Creatinine ratio 25 (H) 12/23/2022 12:58 AM    Calcium 8.5 12/23/2022 12:58 AM    Bilirubin, total 0.3 12/17/2022 12:52 AM    Alk. phosphatase 139 (H) 12/17/2022 12:52 AM    Protein, total 7.4 12/17/2022 12:52 AM    Albumin 2.3 (L) 12/17/2022 12:52 AM    Globulin 5.1 (H) 12/17/2022 12:52 AM    A-G Ratio 0.5 (L) 12/17/2022 12:52 AM    ALT (SGPT) 22 12/17/2022 12:52 AM    AST (SGOT) 13 (L) 12/17/2022 12:52 AM             Assessment:     1. Colon adenocarcinoma, sigmoid and moderately differentiated:   T4 N0 M0 (Stage IIC)        ECOG PS 0  Intent of treatment - curative  Prognosis: Good    S/P left hemicolectomy 12/21/2022  Perforated colon cancer    I spent 65 minute with the patient in a face-to-face encounter.  I explained him the stage of the disease, pathophysiology of the disease and the treatment approaches. I answered all his questions. More than 50% of the time was utilized in education, counseling and co-ordination of care. Patient sent for consideration of  adjuvant chemotherapy. I spent significant time in explaining the rationale of adjuvant chemotherapy which is getting rid of micrometastatic disease. Adjuvant therapy in colon cancer with 5-FU containing regimen has shown to reduce the risk of recurrence. MOSAIC trial (Rickie Covarrubias NEARCADIOM 2004) showed the benefit of adding Oxalipatin to 5-FU/LV for adjuvant treatment of colon cancer. Due to perforated colon cancer and T4 disease, the risk of recurrence is significant. I recommend adjuvant chemotherapy. Due to work constraints, I recommend adjuvant chemotherapy with CapOx for at least 3 months. The duration of this treatment plan will be until 3-6 months, intolerable side effects, or patient choice. Patient will be meeting with navigation services to discuss any financial barriers to care/estimated cost of care. The patient's emotional well being was addressed during this office visit and patient seems to be coping well with the diagnosis and the treatment. Common Side Effects of Chemotherapy  Decreased Blood Counts Your blood counts can decrease temporarily due to chemotherapy, they will recover over time. This is an expected side effect that your Doctor will be monitoring. If you experience fevers (temperature >100.4°F), bleeding or unexplained bruising, please call the office right away   Risk of Infection Your white blood cells can decrease temporarily due to chemotherapy and can put you at higher risk of infection. Washing hands frequently with soap and avoiding sick contacts can reduce your risk of infection.   If you experience fevers (temperature >100.4°F), shaking chills, or any signs of infection, please call the office immediately   Anemia Chemotherapy can cause your red blood cells to temporarily decrease; this is an expected side effect that your Doctor will be monitoring. You may experience fatigue if this occurs, please notify the office if you experience bleeding, shortness of breath with minimal exertion or at rest, rapid heartbeat, or feeling as though you may lose consciousness. Hair Loss Chemotherapy can affect your hair follicles and cause you to lose hair. This can occur on your scalp hair but also all over your body including eyebrows and eye lashes   Nausea  You have been prescribed nausea medication to take if needed. Please follow the directions given to you by your Doctor. Please call the office if the medications you have been given are not relieving nausea. Vomiting Make sure you are taking anti-nausea medication as prescribed. Eating small amounts of bland foods frequently can help. Please call the office right away if you are vomiting more than 4 times per day or are unable to keep down food or fluids   Diarrhea Eating small amounts of bland foods frequently can help, increase your fluid intake. It is usually ok to take Imodium for diarrhea. Please call the office right away if you experience more than 4 episodes of watery diarrhea or if you are feeling dehydrated. You can reach Medical Oncology at [facility] with further questions or concerns at: [phone] during normal business hours will reach our office. Calls after hours or on the weekend will reach an answering service and the on-call Oncologist will return your call        Plan:       Start CapOx adjuvant chemotherapy  Return at the time of adjuvant chemotherapy  Signatera CTC/MRD test      Signed By: Hayes Patterson MD     January 13, 2023         CC.  Chiquita Castro MD

## 2023-01-13 NOTE — PROGRESS NOTES
Pharmacy Note- Chemotherapy Education    Sada Hancock is a  65 y.o.male  diagnosed with colon cancer here today for chemotherapy counseling and consent. Mr. Titi Chatman is being treated with CapOx. Provided education on oxaliplatin, capcitabine and premedications - palonosetron and dexamethasone. Side effects of chemotherapy reviewed included s/s infection, anemia, appetite changes, thrombocytopenia, fatigue, hair loss/alopecia, bone pain, skin and nail changes, diarrhea/constipation, hand and foot syndrome, and peripheral neuropathy. Patient given ways to manage these side effects and when to contact office. Will plan to start him peripherally but is open to a port if there are issues. Has experience with [address] from a previous speciality medication. [employer] Handout of medications provided to patient. Mr. Titi Chatman verbalized understanding of the information presented, consent signed, and all of the patient's questions were answered.     Radhames Delgadillo, PharmD, [address] [place] Only    Program: Medical Group  CPA in place: Yes  Recommendation Provided To: Patient/Caregiver: 1 via In person  Intervention Detail: Device Training  Intervention Accepted By: Patient/Caregiver: 1  Time Spent (min): 30

## 2023-01-13 NOTE — LETTER
1/13/2023    Patient: Mariano Baker   YOB: 1965   Date of Visit: 1/13/2023     Glo Almaraz, [place] [address]  Via Fax: [phone]    Dear Glo Almaraz DO,      Thank you for referring Mr. Mariano Baker to [address] [place] [address] for evaluation. My notes for this consultation are attached. If you have questions, please do not hesitate to call me. I look forward to following your patient along with you.       Sincerely,    Cat Simmons MD

## 2023-01-31 RX ORDER — ONDANSETRON 2 MG/ML
8 INJECTION INTRAMUSCULAR; INTRAVENOUS AS NEEDED
OUTPATIENT
Start: 2023-02-06

## 2023-01-31 RX ORDER — ACETAMINOPHEN 325 MG/1
650 TABLET ORAL AS NEEDED
Start: 2023-02-06

## 2023-01-31 RX ORDER — HYDROCORTISONE SODIUM SUCCINATE 100 MG/2ML
100 INJECTION, POWDER, FOR SOLUTION INTRAMUSCULAR; INTRAVENOUS AS NEEDED
OUTPATIENT
Start: 2023-02-06

## 2023-01-31 RX ORDER — SODIUM CHLORIDE 9 MG/ML
5-250 INJECTION, SOLUTION INTRAVENOUS AS NEEDED
OUTPATIENT
Start: 2023-02-06

## 2023-01-31 RX ORDER — EPINEPHRINE 1 MG/ML
0.3 INJECTION, SOLUTION, CONCENTRATE INTRAVENOUS AS NEEDED
OUTPATIENT
Start: 2023-02-06

## 2023-01-31 RX ORDER — SODIUM CHLORIDE 9 MG/ML
5-40 INJECTION INTRAVENOUS AS NEEDED
OUTPATIENT
Start: 2023-02-06

## 2023-01-31 RX ORDER — PALONOSETRON 0.05 MG/ML
0.25 INJECTION, SOLUTION INTRAVENOUS ONCE
OUTPATIENT
Start: 2023-02-06 | End: 2023-02-06

## 2023-01-31 RX ORDER — HEPARIN 100 UNIT/ML
500 SYRINGE INTRAVENOUS AS NEEDED
Start: 2023-02-06

## 2023-01-31 RX ORDER — ALBUTEROL SULFATE 0.83 MG/ML
2.5 SOLUTION RESPIRATORY (INHALATION) AS NEEDED
Start: 2023-02-06

## 2023-01-31 RX ORDER — DIPHENHYDRAMINE HYDROCHLORIDE 50 MG/ML
25 INJECTION, SOLUTION INTRAMUSCULAR; INTRAVENOUS AS NEEDED
Start: 2023-02-06

## 2023-01-31 RX ORDER — DIPHENHYDRAMINE HYDROCHLORIDE 50 MG/ML
50 INJECTION, SOLUTION INTRAMUSCULAR; INTRAVENOUS AS NEEDED
Start: 2023-02-06

## 2023-01-31 RX ORDER — DEXTROSE MONOHYDRATE 50 MG/ML
5-250 INJECTION, SOLUTION INTRAVENOUS AS NEEDED
OUTPATIENT
Start: 2023-02-06

## 2023-01-31 RX ORDER — SODIUM CHLORIDE 0.9 % (FLUSH) 0.9 %
5-40 SYRINGE (ML) INJECTION AS NEEDED
OUTPATIENT
Start: 2023-02-06

## 2023-02-03 NOTE — PROGRESS NOTES
Lisa Casiano is a 65 y.o. male here for new patient appt for perforated colon cancer. 1/13/23  Pt here with wife. Pt doing well. Getting better every day. 2/6/23  Treatment today:  Cycle 1 Day 1 CAPOX  Pt states he is doing ok. No concerns brought up. 1. Have you been to the ER, urgent care clinic since your last visit? Hospitalized since your last visit?  no    2. Have you seen or consulted any other health care providers outside of the [address] [place] [address] since your last visit? Include any pap smears or colon screening.  no

## 2023-02-06 ENCOUNTER — HOSPITAL ENCOUNTER (OUTPATIENT)
Dept: INFUSION THERAPY | Age: 58
Discharge: HOME | End: 2023-02-06
Payer: COMMERCIAL

## 2023-02-06 ENCOUNTER — OFFICE VISIT (OUTPATIENT)
Dept: ONCOLOGY | Age: 58
End: 2023-02-06
Payer: COMMERCIAL

## 2023-02-06 ENCOUNTER — DOCUMENTATION ONLY (OUTPATIENT)
Dept: ONCOLOGY | Age: 58
End: 2023-02-06

## 2023-02-06 VITALS
HEIGHT: 69 IN | OXYGEN SATURATION: 98 % | HEART RATE: 77 BPM | DIASTOLIC BLOOD PRESSURE: 86 MMHG | TEMPERATURE: 97 F | BODY MASS INDEX: 29.39 KG/M2 | SYSTOLIC BLOOD PRESSURE: 156 MMHG | RESPIRATION RATE: 16 BRPM | WEIGHT: 198.41 LBS

## 2023-02-06 VITALS
HEIGHT: 69 IN | OXYGEN SATURATION: 98 % | DIASTOLIC BLOOD PRESSURE: 93 MMHG | WEIGHT: 198.5 LBS | HEART RATE: 72 BPM | SYSTOLIC BLOOD PRESSURE: 148 MMHG | TEMPERATURE: 97 F | RESPIRATION RATE: 16 BRPM | BODY MASS INDEX: 29.4 KG/M2

## 2023-02-06 DIAGNOSIS — C18.7 CANCER OF SIGMOID COLON (HCC): Primary | ICD-10-CM

## 2023-02-06 DIAGNOSIS — T45.1X5A CHEMOTHERAPY-INDUCED NAUSEA: ICD-10-CM

## 2023-02-06 DIAGNOSIS — C18.9 MALIGNANT NEOPLASM OF COLON, UNSPECIFIED PART OF COLON (HCC): Primary | ICD-10-CM

## 2023-02-06 DIAGNOSIS — R11.0 CHEMOTHERAPY-INDUCED NAUSEA: ICD-10-CM

## 2023-02-06 LAB
ALBUMIN SERPL-MCNC: 3.8 G/DL (ref 3.5–5)
ALBUMIN/GLOB SERPL: 1 (ref 1.1–2.2)
ALP SERPL-CCNC: 102 U/L (ref 45–117)
ALT SERPL-CCNC: 19 U/L (ref 12–78)
ANION GAP SERPL CALC-SCNC: 3 MMOL/L (ref 5–15)
AST SERPL-CCNC: 14 U/L (ref 15–37)
BASO+EOS+MONOS # BLD AUTO: 0.6 K/UL (ref 0.2–1.2)
BASO+EOS+MONOS NFR BLD AUTO: 13 % (ref 3.2–16.9)
BILIRUB SERPL-MCNC: 0.5 MG/DL (ref 0.2–1)
BUN SERPL-MCNC: 6 MG/DL (ref 6–20)
BUN/CREAT SERPL: 7 (ref 12–20)
CALCIUM SERPL-MCNC: 8.7 MG/DL (ref 8.5–10.1)
CHLORIDE SERPL-SCNC: 106 MMOL/L (ref 97–108)
CO2 SERPL-SCNC: 29 MMOL/L (ref 21–32)
CREAT SERPL-MCNC: 0.82 MG/DL (ref 0.7–1.3)
DIFFERENTIAL METHOD BLD: ABNORMAL
ERYTHROCYTE [DISTWIDTH] IN BLOOD BY AUTOMATED COUNT: 18.1 % (ref 11.8–15.8)
GLOBULIN SER CALC-MCNC: 3.9 G/DL (ref 2–4)
GLUCOSE SERPL-MCNC: 97 MG/DL (ref 65–100)
HAV IGM SER QL: NONREACTIVE
HBV CORE IGM SER QL: NONREACTIVE
HBV SURFACE AG SER QL: <0.1 INDEX
HBV SURFACE AG SER QL: NEGATIVE
HCT VFR BLD AUTO: 42.2 % (ref 36.6–50.3)
HCV AB SERPL QL IA: NONREACTIVE
HGB BLD-MCNC: 14 G/DL (ref 12.1–17)
LYMPHOCYTES # BLD: 0.9 K/UL (ref 0.8–3.5)
LYMPHOCYTES NFR BLD: 21 % (ref 12–49)
MCH RBC QN AUTO: 32.6 PG (ref 26–34)
MCHC RBC AUTO-ENTMCNC: 33.2 G/DL (ref 30–36.5)
MCV RBC AUTO: 98.4 FL (ref 80–99)
NEUTS SEG # BLD: 3 K/UL (ref 1.8–8)
NEUTS SEG NFR BLD: 67 % (ref 32–75)
PLATELET # BLD AUTO: 204 K/UL (ref 150–400)
POTASSIUM SERPL-SCNC: 4.3 MMOL/L (ref 3.5–5.1)
PROT SERPL-MCNC: 7.7 G/DL (ref 6.4–8.2)
RBC # BLD AUTO: 4.29 M/UL (ref 4.1–5.7)
SODIUM SERPL-SCNC: 138 MMOL/L (ref 136–145)
SP1: NORMAL
SP2: NORMAL
SP3: NORMAL
WBC # BLD AUTO: 4.5 K/UL (ref 4.1–11.1)

## 2023-02-06 PROCEDURE — 74011000258 HC RX REV CODE- 258: Performed by: INTERNAL MEDICINE

## 2023-02-06 PROCEDURE — 80053 COMPREHEN METABOLIC PANEL: CPT

## 2023-02-06 PROCEDURE — 80074 ACUTE HEPATITIS PANEL: CPT

## 2023-02-06 PROCEDURE — 96375 TX/PRO/DX INJ NEW DRUG ADDON: CPT

## 2023-02-06 PROCEDURE — 74011250636 HC RX REV CODE- 250/636: Performed by: INTERNAL MEDICINE

## 2023-02-06 PROCEDURE — 85025 COMPLETE CBC W/AUTO DIFF WBC: CPT

## 2023-02-06 PROCEDURE — 96415 CHEMO IV INFUSION ADDL HR: CPT

## 2023-02-06 PROCEDURE — 99215 OFFICE O/P EST HI 40 MIN: CPT | Performed by: INTERNAL MEDICINE

## 2023-02-06 PROCEDURE — 36415 COLL VENOUS BLD VENIPUNCTURE: CPT

## 2023-02-06 PROCEDURE — 96413 CHEMO IV INFUSION 1 HR: CPT

## 2023-02-06 RX ORDER — HEPARIN 100 UNIT/ML
500 SYRINGE INTRAVENOUS AS NEEDED
Status: ACTIVE | OUTPATIENT
Start: 2023-02-06 | End: 2023-02-06

## 2023-02-06 RX ORDER — PROCHLORPERAZINE MALEATE 10 MG
5 TABLET ORAL
Qty: 60 TABLET | Refills: 3 | Status: SHIPPED | OUTPATIENT
Start: 2023-02-06

## 2023-02-06 RX ORDER — PALONOSETRON 0.05 MG/ML
0.25 INJECTION, SOLUTION INTRAVENOUS ONCE
Status: COMPLETED | OUTPATIENT
Start: 2023-02-06 | End: 2023-02-06

## 2023-02-06 RX ORDER — SODIUM CHLORIDE 9 MG/ML
5-40 INJECTION INTRAVENOUS AS NEEDED
Status: ACTIVE | OUTPATIENT
Start: 2023-02-06 | End: 2023-02-06

## 2023-02-06 RX ORDER — APREMILAST 30 MG/1
1 TABLET, FILM COATED ORAL
COMMUNITY

## 2023-02-06 RX ORDER — ONDANSETRON 4 MG/1
4 TABLET, ORALLY DISINTEGRATING ORAL
Qty: 45 TABLET | Refills: 1 | Status: SHIPPED | OUTPATIENT
Start: 2023-02-06

## 2023-02-06 RX ORDER — DEXTROSE MONOHYDRATE 50 MG/ML
5-250 INJECTION, SOLUTION INTRAVENOUS AS NEEDED
Status: DISPENSED | OUTPATIENT
Start: 2023-02-06 | End: 2023-02-06

## 2023-02-06 RX ORDER — SODIUM CHLORIDE 0.9 % (FLUSH) 0.9 %
5-40 SYRINGE (ML) INJECTION AS NEEDED
Status: DISPENSED | OUTPATIENT
Start: 2023-02-06 | End: 2023-02-06

## 2023-02-06 RX ADMIN — PALONOSETRON 0.25 MG: 0.05 INJECTION, SOLUTION INTRAVENOUS at 09:37

## 2023-02-06 RX ADMIN — DEXTROSE MONOHYDRATE 25 ML/HR: 50 INJECTION, SOLUTION INTRAVENOUS at 10:30

## 2023-02-06 RX ADMIN — DEXAMETHASONE SODIUM PHOSPHATE 12 MG: 4 INJECTION, SOLUTION INTRAMUSCULAR; INTRAVENOUS at 09:43

## 2023-02-06 RX ADMIN — DEXTROSE MONOHYDRATE 265 MG: 50 INJECTION, SOLUTION INTRAVENOUS at 10:30

## 2023-02-06 NOTE — LETTER
2/6/2023    Patient: Tawny Gonzalez   YOB: 1965   Date of Visit: 2/6/2023     Fern Rosales, [place] [address] [address]  Via Fax: [phone]    Dear Fern Rosales DO,      Thank you for referring Mr. Tawny Gonzalez to [address] [place] [address] for evaluation. My notes for this consultation are attached. If you have questions, please do not hesitate to call me. I look forward to following your patient along with you.       Sincerely,    Gurmeet Eric MD

## 2023-02-06 NOTE — PROGRESS NOTES
Oncology Social Work  Psychosocial Assessment    Reason for Assessment:      [] Social Work Referral [x] Initial Assessment [x] New Diagnosis [] Other    Advance Care Planning:  Advance Care Planning 2/6/2023   Patient's [address] is: Legal Next [address] Directive None   Patient Would Like to Complete Advance Directive No       Sources of Information:    [x]Patient  [x]Family  [x]Staff  [x]Medical Record    Mental Status:    [x]Alert  []Lethargic  []Unresponsive   [] Unable to assess   Oriented to:  [x]Person  [x]Place  [x]Time  [x]Situation      Relationship Status:  []Single  [x]  []Significant Other/Life Partner  []  []  []    Living Circumstances:  []Lives Alone  [x]Family/Significant Other in Household  []Roommates  []Children in the Home  []Paid Caregivers  []Assisted Living Facility/Group [address]  []Homeless  []Incarcerated  []Environmental/Care Concerns  []Other:    Employment Status:  [x]Employed Full-time []Employed Part-time []Homemaker  [] Retired [] Short-Term Disability [] Long-Term Disability  [] Unemployed   [] [place]   [] SSDI  [] Self-Employment    Barriers to Learning:    []Language  []Developmental  []Cognitive  []Altered Mental Status  []Visual/Hearing Impairment  []Unable to Read/Write  []Motivational  [] Challenges Understanding Medical Jargon [x]No Barriers Identified      Financial/Legal Concerns:    []Uninsured  [x]Limited Income/Resources  []Non-Citizen  []Food Insecurity []No Concerns Identified   []Other:    Catholic/Spiritual/Existential:  Does patient have any spiritual or Rastafarian beliefs? [x] Yes [] No  Is the patient involved in a spiritual, beltran or Rastafarian community?  [] Yes [x] No  Patient expressing spiritual/existential angst? [] Yes [x] No  Notes:    Support System:    Identified Support Person/Group:  []Strong  [x]Fair  []Limited    Coping with Illness:   []  Coping Well  [x] Challenges Coping with Serious Illness [] Situational Depression [] Situational Anxiety [] Anticipatory Grief  [] Recent Loss [] Caregiver [employer]            Narrative:     Met with patient and wife, Elizabeth Staggers of 9 years,  to introduce social work navigator role and supports. Pt is a  for elicit [employer] ([address]) for 6 years. PT has 1 son who is 31 yo. Pt has been out of work since 12/8/22 when he was hospitalized until 12/23/22 and surgeon had him out of work until 2/13/22. PT was paid by the [employer] thru January 23 and now has a form for STD for completion. SW completed the form for him to be out of work 2/1/23-5/1/23 (pt has 4 treatments- 1 day every 21 days). Spouse stated her employer is working with her and no forms needed at this time. .      Plan:   Introduce self and role of the  in the [address] [place] [address] Dr. Informed the patient of the [facility] and available resources there. Continue to meet with the patient when he returns to the clinic for ongoing assessment of the patient's adjustment to his diagnosis and treatment. Ongoing psychosocial support as desired by patient. Referral/Handouts:       Complementary therapies referral  Insurance/Entitlements referral  pt asked if he would qualify for any govt programs. SW explained SSDI is permanent disability and SSI/ has to be treated for 1 year. Financial/Medication assistance referral  provided FAA to pt and explained 3 pay stubs and 3 months bank statements. Sabina Frias.  Sabina Cat, MSW/SATISH  Supervisee in Social Work

## 2023-02-06 NOTE — PROGRESS NOTES
2001 [facility] [address], [address] [facility], [address] [place] [address], [address] [place] [address]  [phone]    Oncology Note      Patient: Ed Arechiga MRN: [id number]  SSN: [id number]    YOB: 1965  Age: 65 y.o. Sex: male        Diagnosis:     1. Colon adenocarcinoma, sigmoid and moderately differentiated:   T4 N0 M0 (Stage IIC)       Treatment:      1. Adjuvant chemotherapy   CapOx - Cycle 1 Day 1    Subjective:      Ed Arechiga is a 65 y.o. male who I am seeing for a new diagnosis of colon carcinoma. He was noted to have diverticulitis which did not respond to antibiotics treatment. He underwent a left hemicolectomy. The pathology reveals colon adenocarcinoma. He comes in to discuss the next steps. 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 He is here today to start adjuvant chemotherapy with CapOx. Patient is supposed to receive Capecitabine in the mail tomorrow. Review of Systems:    Constitutional: negative  Eyes: negative  Ears, Nose, Mouth, Throat, and Face: negative  Respiratory: negative  Cardiovascular: negative  Gastrointestinal: negative  Genitourinary:negative  Integument/Breast: negative  Hematologic/Lymphatic: negative  Musculoskeletal:negative  Neurological: negative    Review of systems was reviewed and updated as needed on 02/06/23.       Past Medical History:   Diagnosis Date    Cancer ([facility] [address] 2023    colon    Hypercholesterolemia     Psoriasis      Past Surgical History:   Procedure Laterality Date    HX OTHER SURGICAL      Laparoscopic inguinal hernia repair      Family History   Problem Relation Age of Onset    Crohn's Disease Sister     Cancer Paternal Grandfather         colon     Social History     Tobacco Use    Smoking status: Every Day     Packs/day: 0.50     Types: Cigarettes    Smokeless tobacco: Never   Substance Use Topics    Alcohol use: Yes      Prior to Admission medications    Medication Sig Start Date End Date Taking? Authorizing Provider   apremilast Yifan Bal) 30 mg tab Take 1 mg by mouth once over twenty-four (24) hours. Yes Provider, Historical   ondansetron (ZOFRAN ODT) 4 mg disintegrating tablet Take 1 Tablet by mouth every eight (8) hours as needed for Nausea or Vomiting. 2/6/23  Yes April Rodrigues MD   prochlorperazine (COMPAZINE) 10 mg tablet Take 0.5 Tablets by mouth every six (6) hours as needed for Nausea. 2/6/23  Yes April Rodrigues MD          No Known Allergies        Objective:     Visit Vitals  BP (!) 156/86 (BP 1 Location: Left upper arm, BP Patient Position: Sitting)   Pulse 77   Temp 97 °F (36.1 °C)   Resp 16   Ht 5' 9\" (1.753 m)   Wt 198 lb 6.6 oz (90 kg)   SpO2 98%   BMI 29.30 kg/m²       Pain Scale: 0 - No pain/10  Pain Location:       Physical Exam:    GENERAL: alert, cooperative, no distress, appears stated age  EYE: conjunctivae/corneas clear. LYMPHATIC: Cervical, supraclavicular, and axillary nodes normal.   THROAT & NECK: normal and no erythema or exudates noted. LUNG: clear to auscultation bilaterally  HEART: regular rate and rhythm  ABDOMEN: soft, non-tender. EXTREMITIES:  extremities normal, atraumatic, no cyanosis or edema  SKIN: no rash or abnormalities  NEUROLOGIC: AOx3. Gait normal.     The above physical exam was reviewed and updated as needed on 02/06/23.       Lab Results   Component Value Date/Time    WBC 4.5 02/06/2023 08:26 AM    HGB 14.0 02/06/2023 08:26 AM    HCT 42.2 02/06/2023 08:26 AM    PLATELET 190 90/91/4501 08:26 AM    MCV 98.4 02/06/2023 08:26 AM         Lab Results   Component Value Date/Time    Sodium 138 02/06/2023 08:26 AM    Potassium 4.3 02/06/2023 08:26 AM    Chloride 106 02/06/2023 08:26 AM    CO2 29 02/06/2023 08:26 AM    Anion gap 3 (L) 02/06/2023 08:26 AM    Glucose 97 02/06/2023 08:26 AM    BUN 6 02/06/2023 08:26 AM    Creatinine 0.82 02/06/2023 08:26 AM    BUN/Creatinine ratio 7 (L) 02/06/2023 08:26 AM    Calcium 8.7 02/06/2023 08:26 AM Bilirubin, total 0.5 02/06/2023 08:26 AM    Alk. phosphatase 102 02/06/2023 08:26 AM    Protein, total 7.7 02/06/2023 08:26 AM    Albumin 3.8 02/06/2023 08:26 AM    Globulin 3.9 02/06/2023 08:26 AM    A-G Ratio 1.0 (L) 02/06/2023 08:26 AM    ALT (SGPT) 19 02/06/2023 08:26 AM    AST (SGOT) 14 (L) 02/06/2023 08:26 AM       Assessment:     1. Colon adenocarcinoma, sigmoid and moderately differentiated:   T4 N0 M0 (Stage IIC)        ECOG PS 0  Intent of treatment - curative  Prognosis: Good    S/P left hemicolectomy 12/21/2022  Perforated colon cancer    Due to perforated colon cancer and T4 disease, the risk of recurrence is significant. I recommend adjuvant chemotherapy. Due to work constraints, I recommend adjuvant chemotherapy with CapOx for at least 3 months. Starting adjuvant chemotherapy   CapOx - Cycle 1 Day 1    Reviewed the expected side effects of chemotherapy and ways to manage them. Blood counts acceptable. Results reviewed with patient. Plan:     Proceed with C#1 CAPOX (Oxaliplatin 130mg/m2 IV on day 1 and Capecitabine 850mg/m2 PO bid on day 1-14) given every 3 weeks   Labs prior to each cycle: CBC, BMP  Hepatic function panel every 6 weeks  Prophylactic antiemetics: Palonosetron and Dexamethasone prior to treatment and dexamethasone at home for 2 days after each treatment. PRN Antiemetics: Ondansetron and Prochlorperazine PRN at home  EMLA cream for port  Signatera CTC/MRD test  Follow-up in 3 weeks      Signed By: Vasu Shaw MD     February 6, 2023         Attestation of attending physician. I personally saw and evaluated the patient and performed the key components of medical decision making with Dontae Boland NP. I reviewed and verified the above documentation and modified it as needed. Mr. Kiah Paula is a man with a diagnosis of Colon ca. He is receiving adjuvant chemotherapy.      Vitals is normal  Exam reveals a pleasant man  Chest is clear, heart sounds regular  Abdomen is soft  Alert and oriented     He will continue systemic therapy. I obtained history, performed physical exam, reviewed labs and imaging and communicated the treatment plan to the patient. Signed by: Reema Tadeo MD                     February 6, 2023        CC.  Chinmay Parker MD

## 2023-02-06 NOTE — PROGRESS NOTES
[facility] Progress Note    Date: 2023    Name: Isaac Sharp    MRN: [id number]         : 1965    Mr. Ranjith Banks arrived (ambulatory) at 0800 and in no distress for cycle 1 day 1 of Capox regimen. Assessment was completed, no acute issues at this time, no new complaints voiced. Peripheral IV 23 Left Forearm (Active)   Site Assessment Clean, dry, & intact 23 0815   Phlebitis Assessment 0 23 0815   Infiltration Assessment 0 23 0815   Dressing Status New 23 0815   Dressing Type Transparent 23 0815   Hub Color/Line Status Blue;Flushed; Infusing 23 0815   Action Taken Blood drawn 23 0815   Alcohol Cap Used Yes 23 0815    + blood return noted, labs drawn and sent. Patient returned from MD office and patient PIV flushed and painful with hardened area. PIV removed and new PIV started. Peripheral IV 23 Posterior;Right Wrist (Active)   Site Assessment Clean, dry, & intact 23 0930   Phlebitis Assessment 0 23 0930   Infiltration Assessment 0 23 0930   Dressing Status New 23 0930   Dressing Type Transparent 23 0930   Hub Color/Line Status Blue;Flushed; Infusing 23 0930   Action Taken Blood drawn 23 0930   Alcohol Cap Used Yes 23 0930         Proceeded to appt with Dr. Mr. Raffi Dickey vitals were reviewed. Patient Vitals for the past 12 hrs:   Temp Pulse Resp BP SpO2   23 1237 -- 72 16 (!) 148/93 --   23 1225 -- 70 16 (!) 160/103 --   23 0807 97 °F (36.1 °C) 77 16 (!) 163/97 98 %         Lab results were obtained and reviewed.   Recent Results (from the past 12 hour(s))   METABOLIC PANEL, COMPREHENSIVE    Collection Time: 23  8:26 AM   Result Value Ref Range    Sodium 138 136 - 145 mmol/L    Potassium 4.3 3.5 - 5.1 mmol/L    Chloride 106 97 - 108 mmol/L    CO2 29 21 - 32 mmol/L    Anion gap 3 (L) 5 - 15 mmol/L    Glucose 97 65 - 100 mg/dL    BUN 6 6 - 20 MG/DL    Creatinine 0. 82 0.70 - 1.30 MG/DL    BUN/Creatinine ratio 7 (L) 12 - 20      eGFR >60 >60 ml/min/1.73m2    Calcium 8.7 8.5 - 10.1 MG/DL    Bilirubin, total 0.5 0.2 - 1.0 MG/DL    ALT (SGPT) 19 12 - 78 U/L    AST (SGOT) 14 (L) 15 - 37 U/L    Alk. phosphatase 102 45 - 117 U/L    Protein, total 7.7 6.4 - 8.2 g/dL    Albumin 3.8 3.5 - 5.0 g/dL    Globulin 3.9 2.0 - 4.0 g/dL    A-G Ratio 1.0 (L) 1.1 - 2.2     HEPATITIS PANEL, ACUTE    Collection Time: 02/06/23  8:26 AM   Result Value Ref Range    Hepatitis A, IgM NONREACTIVE NR      __          Hepatitis B surface Ag <0.10 Index    Hep B surface Ag Interp. Negative NEG      __          Hepatitis B core, IgM NONREACTIVE NR      __          Hep C virus Ab Interp. NONREACTIVE NR     CBC WITH 3 PART DIFF    Collection Time: 02/06/23  8:26 AM   Result Value Ref Range    WBC 4.5 4.1 - 11.1 K/uL    RBC 4.29 4. 10 - 5.70 M/uL    HGB 14.0 12.1 - 17.0 g/dL    HCT 42.2 36.6 - 50.3 %    MCV 98.4 80.0 - 99.0 FL    MCH 32.6 26.0 - 34.0 PG    MCHC 33.2 30.0 - 36.5 g/dL    RDW 18.1 (H) 11.8 - 15.8 %    PLATELET 278 114 - 563 K/uL    NEUTROPHILS 67 32 - 75 %    Mixed cells 13 3.2 - 16.9 %    LYMPHOCYTES 21 12 - 49 %    ABS. NEUTROPHILS 3.0 1.8 - 8.0 K/UL    ABS. MIXED CELLS 0.6 0.2 - 1.2 K/uL    ABS. LYMPHOCYTES 0.9 0.8 - 3.5 K/UL    DF AUTOMATED       Patient's labs within parameters for treatment. Pre-medications  were administered as ordered and chemotherapy was initiated.      CHEMO ADMINISTRATION CHECKLIST:  Two nurses verified prior to administering  Drug name  Drug dose  Infusion volume or drug volume when prepared in a syringe  Rate of administration  Route of administration  Expiration dates and/or times  Appearance and physical integrity of the drugs  Rate set on infusion pump, when used  Sequencing of drug administration        Medication:  Medications Administered       dexamethasone (DECADRON) 12 mg in 0.9% sodium chloride 50 mL IVPB       Admin Date  02/06/2023 Action  New Bag Dose  12 mg Route  IntraVENous Administered By  Megan Bone A              dextrose 5% infusion       Admin Date  02/06/2023 Action  New Bag Dose  25 mL/hr Rate  25 mL/hr Route  IntraVENous Administered By  Megan Bone A              oxaliplatin (ELOXATIN) 265 mg in dextrose 5% 250 mL, overfill volume 25 mL chemo infusion       Admin Date  02/06/2023 Action  New Bag Dose  265 mg Rate  164 mL/hr Route  IntraVENous Administered By  Megan Bone A              palonosetron HCl (ALOXI) injection 0.25 mg       Admin Date  02/06/2023 Action  Given Dose  0.25 mg Route  IntraVENous Administered By  Megan Bone A                  Patient PIV flushed and removed, bandage placed over site. Mr. Kalyn Rodriguez tolerated treatment well and was discharged from [place] [address] in stable condition at [phone]. He is aware of when to return for his next appointment.     Future Appointments   Date Time Provider Jay Kurtz   2/27/2023  8:00 AM [address] [place] [address],[address] REG   3/20/2023  8:00 AM [address] [place] [address],[facility] Floor REG   4/10/2023  8:00 AM [place] [address] REG       Radha Carranza  February 6, 2023

## 2023-02-06 NOTE — PROGRESS NOTES

## 2023-02-20 RX ORDER — ONDANSETRON 2 MG/ML
8 INJECTION INTRAMUSCULAR; INTRAVENOUS AS NEEDED
Status: CANCELLED | OUTPATIENT
Start: 2023-02-27

## 2023-02-20 RX ORDER — ACETAMINOPHEN 325 MG/1
650 TABLET ORAL AS NEEDED
Status: CANCELLED
Start: 2023-02-27

## 2023-02-20 RX ORDER — DIPHENHYDRAMINE HYDROCHLORIDE 50 MG/ML
50 INJECTION, SOLUTION INTRAMUSCULAR; INTRAVENOUS AS NEEDED
Status: CANCELLED
Start: 2023-02-27

## 2023-02-20 RX ORDER — ALBUTEROL SULFATE 0.83 MG/ML
2.5 SOLUTION RESPIRATORY (INHALATION) AS NEEDED
Status: CANCELLED
Start: 2023-02-27

## 2023-02-20 RX ORDER — HYDROCORTISONE SODIUM SUCCINATE 100 MG/2ML
100 INJECTION, POWDER, FOR SOLUTION INTRAMUSCULAR; INTRAVENOUS AS NEEDED
Status: CANCELLED | OUTPATIENT
Start: 2023-02-27

## 2023-02-20 RX ORDER — DIPHENHYDRAMINE HYDROCHLORIDE 50 MG/ML
25 INJECTION, SOLUTION INTRAMUSCULAR; INTRAVENOUS AS NEEDED
Status: CANCELLED
Start: 2023-02-27

## 2023-02-20 RX ORDER — EPINEPHRINE 1 MG/ML
0.3 INJECTION, SOLUTION, CONCENTRATE INTRAVENOUS AS NEEDED
Status: CANCELLED | OUTPATIENT
Start: 2023-02-27

## 2023-02-24 RX ORDER — CAPECITABINE 500 MG/1
1500 TABLET, FILM COATED ORAL 2 TIMES DAILY
Qty: 84 TABLET | Refills: 5 | Status: ACTIVE | OUTPATIENT
Start: 2023-02-24 | End: 2023-03-10

## 2023-02-24 RX ORDER — CAPECITABINE 150 MG/1
300 TABLET, FILM COATED ORAL 2 TIMES DAILY
Qty: 56 TABLET | Refills: 5 | Status: ACTIVE | OUTPATIENT
Start: 2023-02-24 | End: 2023-03-10

## 2023-02-27 ENCOUNTER — OFFICE VISIT (OUTPATIENT)
Dept: ONCOLOGY | Age: 58
End: 2023-02-27
Payer: COMMERCIAL

## 2023-02-27 ENCOUNTER — HOSPITAL ENCOUNTER (OUTPATIENT)
Dept: INFUSION THERAPY | Age: 58
Discharge: HOME | End: 2023-02-27
Payer: COMMERCIAL

## 2023-02-27 VITALS
OXYGEN SATURATION: 100 % | TEMPERATURE: 97.9 F | DIASTOLIC BLOOD PRESSURE: 94 MMHG | RESPIRATION RATE: 16 BRPM | SYSTOLIC BLOOD PRESSURE: 151 MMHG | WEIGHT: 191.7 LBS | HEART RATE: 79 BPM | HEIGHT: 69 IN | BODY MASS INDEX: 28.39 KG/M2

## 2023-02-27 VITALS
RESPIRATION RATE: 16 BRPM | TEMPERATURE: 97.9 F | OXYGEN SATURATION: 100 % | HEART RATE: 73 BPM | SYSTOLIC BLOOD PRESSURE: 146 MMHG | WEIGHT: 191 LBS | BODY MASS INDEX: 28.29 KG/M2 | HEIGHT: 69 IN | DIASTOLIC BLOOD PRESSURE: 96 MMHG

## 2023-02-27 DIAGNOSIS — E87.6 HYPOKALEMIA: ICD-10-CM

## 2023-02-27 DIAGNOSIS — C18.7 CANCER OF SIGMOID COLON (HCC): Primary | ICD-10-CM

## 2023-02-27 DIAGNOSIS — C18.9 MALIGNANT NEOPLASM OF COLON, UNSPECIFIED PART OF COLON (HCC): Primary | ICD-10-CM

## 2023-02-27 LAB
ALBUMIN SERPL-MCNC: 4.1 G/DL (ref 3.5–5)
ALBUMIN/GLOB SERPL: 1 (ref 1.1–2.2)
ALP SERPL-CCNC: 112 U/L (ref 45–117)
ALT SERPL-CCNC: 36 U/L (ref 12–78)
ANION GAP SERPL CALC-SCNC: 4 MMOL/L (ref 5–15)
AST SERPL-CCNC: 40 U/L (ref 15–37)
BASOPHILS # BLD: 0 K/UL (ref 0–0.1)
BASOPHILS NFR BLD: 1 % (ref 0–1)
BILIRUB SERPL-MCNC: 0.9 MG/DL (ref 0.2–1)
BUN SERPL-MCNC: 3 MG/DL (ref 6–20)
BUN/CREAT SERPL: 4 (ref 12–20)
CALCIUM SERPL-MCNC: 8.8 MG/DL (ref 8.5–10.1)
CHLORIDE SERPL-SCNC: 106 MMOL/L (ref 97–108)
CO2 SERPL-SCNC: 28 MMOL/L (ref 21–32)
CREAT SERPL-MCNC: 0.83 MG/DL (ref 0.7–1.3)
DIFFERENTIAL METHOD BLD: ABNORMAL
EOSINOPHIL # BLD: 0.2 K/UL (ref 0–0.4)
EOSINOPHIL NFR BLD: 6 % (ref 0–7)
ERYTHROCYTE [DISTWIDTH] IN BLOOD BY AUTOMATED COUNT: 17.4 % (ref 11.5–14.5)
GLOBULIN SER CALC-MCNC: 4.2 G/DL (ref 2–4)
GLUCOSE SERPL-MCNC: 99 MG/DL (ref 65–100)
HCT VFR BLD AUTO: 43.2 % (ref 36.6–50.3)
HGB BLD-MCNC: 14.9 G/DL (ref 12.1–17)
IMM GRANULOCYTES # BLD AUTO: 0 K/UL (ref 0–0.04)
IMM GRANULOCYTES NFR BLD AUTO: 0 % (ref 0–0.5)
LYMPHOCYTES # BLD: 0.9 K/UL (ref 0.8–3.5)
LYMPHOCYTES NFR BLD: 23 % (ref 12–49)
MCH RBC QN AUTO: 33.7 PG (ref 26–34)
MCHC RBC AUTO-ENTMCNC: 34.5 G/DL (ref 30–36.5)
MCV RBC AUTO: 97.7 FL (ref 80–99)
MONOCYTES # BLD: 0.6 K/UL (ref 0–1)
MONOCYTES NFR BLD: 15 % (ref 5–13)
NEUTS SEG # BLD: 2.1 K/UL (ref 1.8–8)
NEUTS SEG NFR BLD: 55 % (ref 32–75)
NRBC # BLD: 0 K/UL (ref 0–0.01)
NRBC BLD-RTO: 0 PER 100 WBC
PLATELET # BLD AUTO: 142 K/UL (ref 150–400)
PMV BLD AUTO: 9.2 FL (ref 8.9–12.9)
POTASSIUM SERPL-SCNC: 3.5 MMOL/L (ref 3.5–5.1)
PROT SERPL-MCNC: 8.3 G/DL (ref 6.4–8.2)
RBC # BLD AUTO: 4.42 M/UL (ref 4.1–5.7)
RBC MORPH BLD: ABNORMAL
SODIUM SERPL-SCNC: 138 MMOL/L (ref 136–145)
WBC # BLD AUTO: 3.8 K/UL (ref 4.1–11.1)

## 2023-02-27 PROCEDURE — 36415 COLL VENOUS BLD VENIPUNCTURE: CPT

## 2023-02-27 PROCEDURE — 96413 CHEMO IV INFUSION 1 HR: CPT

## 2023-02-27 PROCEDURE — 74011000258 HC RX REV CODE- 258: Performed by: INTERNAL MEDICINE

## 2023-02-27 PROCEDURE — 74011250636 HC RX REV CODE- 250/636: Performed by: INTERNAL MEDICINE

## 2023-02-27 PROCEDURE — 80053 COMPREHEN METABOLIC PANEL: CPT

## 2023-02-27 PROCEDURE — 96375 TX/PRO/DX INJ NEW DRUG ADDON: CPT

## 2023-02-27 PROCEDURE — 74011000250 HC RX REV CODE- 250: Performed by: INTERNAL MEDICINE

## 2023-02-27 PROCEDURE — 85025 COMPLETE CBC W/AUTO DIFF WBC: CPT

## 2023-02-27 PROCEDURE — 99215 OFFICE O/P EST HI 40 MIN: CPT | Performed by: INTERNAL MEDICINE

## 2023-02-27 PROCEDURE — 96415 CHEMO IV INFUSION ADDL HR: CPT

## 2023-02-27 RX ORDER — POTASSIUM CHLORIDE 750 MG/1
10 TABLET, EXTENDED RELEASE ORAL 2 TIMES DAILY
Qty: 60 TABLET | Refills: 3 | Status: SHIPPED | OUTPATIENT
Start: 2023-02-27

## 2023-02-27 RX ORDER — CLINDAMYCIN PHOSPHATE 10 MG/G
GEL TOPICAL
COMMUNITY

## 2023-02-27 RX ORDER — DEXTROSE MONOHYDRATE 50 MG/ML
5-250 INJECTION, SOLUTION INTRAVENOUS AS NEEDED
Status: DISPENSED | OUTPATIENT
Start: 2023-02-27 | End: 2023-02-27

## 2023-02-27 RX ORDER — SODIUM CHLORIDE 9 MG/ML
5-40 INJECTION INTRAVENOUS AS NEEDED
Status: ACTIVE | OUTPATIENT
Start: 2023-02-27 | End: 2023-02-27

## 2023-02-27 RX ORDER — KETOCONAZOLE 20 MG/G
CREAM TOPICAL
COMMUNITY

## 2023-02-27 RX ORDER — PALONOSETRON 0.05 MG/ML
0.25 INJECTION, SOLUTION INTRAVENOUS ONCE
Status: COMPLETED | OUTPATIENT
Start: 2023-02-27 | End: 2023-02-27

## 2023-02-27 RX ORDER — SODIUM CHLORIDE 0.9 % (FLUSH) 0.9 %
5-40 SYRINGE (ML) INJECTION AS NEEDED
Status: DISPENSED | OUTPATIENT
Start: 2023-02-27 | End: 2023-02-27

## 2023-02-27 RX ORDER — SODIUM CHLORIDE 9 MG/ML
5-250 INJECTION, SOLUTION INTRAVENOUS AS NEEDED
Status: DISPENSED | OUTPATIENT
Start: 2023-02-27 | End: 2023-02-27

## 2023-02-27 RX ORDER — HEPARIN 100 UNIT/ML
500 SYRINGE INTRAVENOUS AS NEEDED
Status: ACTIVE | OUTPATIENT
Start: 2023-02-27 | End: 2023-02-27

## 2023-02-27 RX ADMIN — OXALIPLATIN 265 MG: 5 INJECTION, SOLUTION INTRAVENOUS at 10:38

## 2023-02-27 RX ADMIN — DEXTROSE MONOHYDRATE 25 ML/HR: 50 INJECTION, SOLUTION INTRAVENOUS at 09:45

## 2023-02-27 RX ADMIN — DEXAMETHASONE SODIUM PHOSPHATE 12 MG: 4 INJECTION, SOLUTION INTRAMUSCULAR; INTRAVENOUS at 09:54

## 2023-02-27 RX ADMIN — PALONOSETRON HYDROCHLORIDE 0.25 MG: 0.25 INJECTION, SOLUTION INTRAVENOUS at 09:45

## 2023-02-27 RX ADMIN — SODIUM CHLORIDE, PRESERVATIVE FREE 10 ML: 5 INJECTION INTRAVENOUS at 12:51

## 2023-02-27 RX ADMIN — SODIUM CHLORIDE, PRESERVATIVE FREE 10 ML: 5 INJECTION INTRAVENOUS at 08:20

## 2023-02-27 NOTE — PROGRESS NOTES
Pt arrived to [facility] ambulatory in no acute distress at [facility] for Capox C2. Assessment completed; pt reports runny nose and cough, blurry vision, neuropathy, cold sensitivity, and diarrhea. IV established in left forearm site WNL. Labs obtained, CBC and CMP. Patient to MD office for apt. Patient Vitals for the past 12 hrs:   Temp Pulse Resp BP SpO2   02/27/23 1251 -- 79 -- (!) 151/94 --   02/27/23 0815 97.9 °F (36.6 °C) 73 16 (!) 148/95 100 %      Recent Results (from the past 12 hour(s))   CBC WITH AUTOMATED DIFF    Collection Time: 02/27/23  8:25 AM   Result Value Ref Range    WBC 3.8 (L) 4.1 - 11.1 K/uL    RBC 4.42 4.10 - 5.70 M/uL    HGB 14.9 12.1 - 17.0 g/dL    HCT 43.2 36.6 - 50.3 %    MCV 97.7 80.0 - 99.0 FL    MCH 33.7 26.0 - 34.0 PG    MCHC 34.5 30.0 - 36.5 g/dL    RDW 17.4 (H) 11.5 - 14.5 %    PLATELET 215 (L) 158 - 400 K/uL    MPV 9.2 8.9 - 12.9 FL    NRBC 0.0 0  WBC    ABSOLUTE NRBC 0.00 0.00 - 0.01 K/uL    NEUTROPHILS 55 32 - 75 %    LYMPHOCYTES 23 12 - 49 %    MONOCYTES 15 (H) 5 - 13 %    EOSINOPHILS 6 0 - 7 %    BASOPHILS 1 0 - 1 %    IMMATURE GRANULOCYTES 0 0.0 - 0.5 %    ABS. NEUTROPHILS 2.1 1.8 - 8.0 K/UL    ABS. LYMPHOCYTES 0.9 0.8 - 3.5 K/UL    ABS. MONOCYTES 0.6 0.0 - 1.0 K/UL    ABS. EOSINOPHILS 0.2 0.0 - 0.4 K/UL    ABS. BASOPHILS 0.0 0.0 - 0.1 K/UL    ABS. IMM.  GRANS. 0.0 0.00 - 0.04 K/UL    DF AUTOMATED      RBC COMMENTS ANISOCYTOSIS  1+       METABOLIC PANEL, COMPREHENSIVE    Collection Time: 02/27/23  8:25 AM   Result Value Ref Range    Sodium 138 136 - 145 mmol/L    Potassium 3.5 3.5 - 5.1 mmol/L    Chloride 106 97 - 108 mmol/L    CO2 28 21 - 32 mmol/L    Anion gap 4 (L) 5 - 15 mmol/L    Glucose 99 65 - 100 mg/dL    BUN 3 (L) 6 - 20 MG/DL    Creatinine 0.83 0.70 - 1.30 MG/DL    BUN/Creatinine ratio 4 (L) 12 - 20      eGFR >60 >60 ml/min/1.73m2    Calcium 8.8 8.5 - 10.1 MG/DL    Bilirubin, total 0.9 0.2 - 1.0 MG/DL    ALT (SGPT) 36 12 - 78 U/L    AST (SGOT) 40 (H) 15 - 37 U/L    Alk. phosphatase 112 45 - 117 U/L    Protein, total 8.3 (H) 6.4 - 8.2 g/dL    Albumin 4.1 3.5 - 5.0 g/dL    Globulin 4.2 (H) 2.0 - 4.0 g/dL    A-G Ratio 1.0 (L) 1.1 - 2.2        The following medications administered:  Medications Administered       0.9% sodium chloride injection 5-40 mL       Admin Date  02/27/2023 Action  Given Dose  10 mL Route  IntraVENous Administered By  Kristen Bernstein, RN              dexamethasone (DECADRON) 12 mg in 0.9% sodium chloride 50 mL IVPB       Admin Date  02/27/2023 Action  New Bag Dose  12 mg Route  IntraVENous Administered By  Kristen Bernstein, RN              dextrose 5% infusion       Admin Date  02/27/2023 Action  New Bag Dose  25 mL/hr Rate  25 mL/hr Route  IntraVENous Administered By  Kristen Bernstein, RN              oxaliplatin (ELOXATIN) 265 mg in dextrose 5% 250 mL, overfill volume 25 mL chemo infusion       Admin Date  02/27/2023 Action  New Bag Dose  265 mg Rate  164 mL/hr Route  IntraVENous Administered By  Kristen Bernstein, RN              palonosetron HCl (ALOXI) injection 0.25 mg       Admin Date  02/27/2023 Action  Given Dose  0.25 mg Route  IntraVENous Administered By  Kristen Bernstein, RN              sodium chloride (NS) flush 5-40 mL       Admin Date  02/27/2023 Action  Given Dose  10 mL Route  IntraVENous Administered By  Kristen Bernstein, RN                     Two nurses verified prior to administering:  Drug name  Drug dose  Infusion volume or drug volume when prepared in a syringe  Rate of administration  Route of administration  Expiration dates and/or times  Appearance and physical integrity of the drugs  Rate set on infusion pump, when used  Sequencing of drug administration     Pt tolerated treatment well. Patient reported at end of treatment his arm was burning and cold which had happened to him with his last treatment too. I informed him that his IV flushed fine with positive blood return and that chemo can be very hard on your veins.  He is going to think about getting a port placed due to his discomfort. IV flushed per policy and removed, 2x2 and coban placed. Pt discharged ambulatory in no acute distress at 1250, accompanied by spouse. Next appointment 3/20/23.

## 2023-02-27 NOTE — PROGRESS NOTES
Rm 1    Questions about labs    Chief Complaint   Patient presents with    Follow-up     chemo     1. Have you been to the ER, urgent care clinic since your last visit? Hospitalized since your last visit? No    2. Have you seen or consulted any other health care providers outside of the [address] [place] [address] since your last visit? Include any pap smears or colon screening.  No    Visit Vitals  BP (!) 146/96 (BP 1 Location: Left upper arm, BP Patient Position: Sitting, BP Cuff Size: Adult)   Pulse 73   Temp 97.9 °F (36.6 °C) (Oral)   Resp 16   Ht 5' 9\" (1.753 m)   Wt 191 lb (86.6 kg)   SpO2 100%   BMI 28.21 kg/m²

## 2023-02-27 NOTE — LETTER
2/27/2023    Patient: Anna Arenas   YOB: 1965   Date of Visit: 2/27/2023     [place] [address]  Via Fax: [phone]    Dear Gemini Gardner DO,      Thank you for referring Mr. Anna Arenas to [address] [place] [address] for evaluation. My notes for this consultation are attached. If you have questions, please do not hesitate to call me. I look forward to following your patient along with you.       Sincerely,    Cody Chen MD

## 2023-02-27 NOTE — PROGRESS NOTES
[address] [facility] [address], [address] [facility], [address] [place] [address], [address] [place] [address]  [phone]    Oncology Note      Patient: Tawny Gonzalez MRN: [id number]  SSN: [id number]    YOB: 1965  Age: 65 y.o. Sex: male        Diagnosis:     1. Colon adenocarcinoma, sigmoid and moderately differentiated:   T4 N0 M0 (Stage IIC)       Treatment:      1. Adjuvant chemotherapy   CapOx - Cycle 2 Day 1    Subjective:      Tawny Gonzalez is a 65 y.o. male who I am seeing for a new diagnosis of colon carcinoma. He was noted to have diverticulitis which did not respond to antibiotics treatment. He underwent a left hemicolectomy. The pathology reveals colon adenocarcinoma. He comes in to discuss the next steps. 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 He is here today for his second cycle of adjuvant chemotherapy with CapOx. He is doing well, however he is having diarrhea. Review of Systems:    Constitutional: negative  Eyes: negative  Ears, Nose, Mouth, Throat, and Face: negative  Respiratory: negative  Cardiovascular: negative  Gastrointestinal: negative  Genitourinary:negative  Integument/Breast: negative  Hematologic/Lymphatic: negative  Musculoskeletal:negative  Neurological: negative    Review of systems was reviewed and updated as needed on 02/27/23.       Past Medical History:   Diagnosis Date    Cancer ([facility] [address] 2023    colon    Hypercholesterolemia     Psoriasis      Past Surgical History:   Procedure Laterality Date    HX OTHER SURGICAL      Laparoscopic inguinal hernia repair      Family History   Problem Relation Age of Onset    Crohn's Disease Sister     Cancer Paternal Grandfather         colon     Social History     Tobacco Use    Smoking status: Every Day     Packs/day: 0.50     Types: Cigarettes    Smokeless tobacco: Never   Substance Use Topics    Alcohol use: Yes      Prior to Admission medications    Medication Sig Start Date End Date Taking? Authorizing Provider   clindamycin (CLINDAGEL) 1 % topical gel 1 application to affected area Externally BID to the affected areas, as needed for flares for 90 days   Yes Provider, Historical   ketoconazole (NIZORAL) 2 % topical cream 1 application to affected area Externally BID to the left shoulder, as needed for flares for 90 days   Yes Provider, Historical   potassium chloride (KLOR-CON M10) 10 mEq tablet Take 1 Tablet by mouth two (2) times a day. 2/27/23  Yes Vikki Briones NP   capecitabine (XELODA) 150 mg tablet 2 Tablets two (2) times a day with 1 other capecitabine prescription for 1,800 mg total. Take 1800mg (3 tablets of 500mg and 2 tablets of 150mg) two times daily for 2 weeks on and 1 week off 2/24/23 3/10/23 Yes Dalila Dumont MD   capecitabine (XELODA) 500 mg tablet 3 Tablets two (2) times a day with 1 other capecitabine prescription for 1,800 mg total. Take 1800mg (3 tablets of 500mg and 2 tablets of 150mg) two times daily for 2 weeks on and 1 week off 2/24/23 3/10/23 Yes Dalila Dumont MD   apremilast Valdemar Kob) 30 mg tab Take 1 mg by mouth once over twenty-four (24) hours. Yes Provider, Historical   ondansetron (ZOFRAN ODT) 4 mg disintegrating tablet Take 1 Tablet by mouth every eight (8) hours as needed for Nausea or Vomiting. 2/6/23  Yes Dalila Dumont MD   prochlorperazine (COMPAZINE) 10 mg tablet Take 0.5 Tablets by mouth every six (6) hours as needed for Nausea. 2/6/23  Yes Dalila Dumont MD          No Known Allergies        Objective:     Visit Vitals  BP (!) 146/96 (BP 1 Location: Left upper arm, BP Patient Position: Sitting, BP Cuff Size: Adult)   Pulse 73   Temp 97.9 °F (36.6 °C) (Oral)   Resp 16   Ht 5' 9\" (1.753 m)   Wt 191 lb (86.6 kg)   SpO2 100%   BMI 28.21 kg/m²     Pain Scale: /10      Physical Exam:    GENERAL: alert, cooperative, no distress, appears stated age  EYE: conjunctivae/corneas clear.    LYMPHATIC: Cervical, supraclavicular, and axillary nodes normal.   THROAT & NECK: normal and no erythema or exudates noted. LUNG: clear to auscultation bilaterally  HEART: regular rate and rhythm  ABDOMEN: soft, non-tender. EXTREMITIES:  extremities normal, atraumatic, no cyanosis or edema  SKIN: no rash or abnormalities  NEUROLOGIC: AOx3. Gait normal.     The above physical exam was reviewed and updated as needed on 02/27/23. Lab Results   Component Value Date/Time    WBC 3.8 (L) 02/27/2023 08:25 AM    HGB 14.9 02/27/2023 08:25 AM    HCT 43.2 02/27/2023 08:25 AM    PLATELET 286 (L) 25/28/1208 08:25 AM    MCV 97.7 02/27/2023 08:25 AM         Lab Results   Component Value Date/Time    Sodium 138 02/27/2023 08:25 AM    Potassium 3.5 02/27/2023 08:25 AM    Chloride 106 02/27/2023 08:25 AM    CO2 28 02/27/2023 08:25 AM    Anion gap 4 (L) 02/27/2023 08:25 AM    Glucose 99 02/27/2023 08:25 AM    BUN 3 (L) 02/27/2023 08:25 AM    Creatinine 0.83 02/27/2023 08:25 AM    BUN/Creatinine ratio 4 (L) 02/27/2023 08:25 AM    Calcium 8.8 02/27/2023 08:25 AM    Bilirubin, total 0.9 02/27/2023 08:25 AM    Alk. phosphatase 112 02/27/2023 08:25 AM    Protein, total 8.3 (H) 02/27/2023 08:25 AM    Albumin 4.1 02/27/2023 08:25 AM    Globulin 4.2 (H) 02/27/2023 08:25 AM    A-G Ratio 1.0 (L) 02/27/2023 08:25 AM    ALT (SGPT) 36 02/27/2023 08:25 AM    AST (SGOT) 40 (H) 02/27/2023 08:25 AM       Assessment:     1. Colon adenocarcinoma, sigmoid and moderately differentiated:   T4 N0 M0 (Stage IIC)        ECOG PS 0  Intent of treatment - curative  Prognosis: Good    S/P left hemicolectomy 12/21/2022  Perforated colon cancer    Due to perforated colon cancer and T4 disease, the risk of recurrence is significant. I recommend adjuvant chemotherapy. Due to work constraints, I recommend adjuvant chemotherapy with CapOx for at least 3 months. Starting adjuvant chemotherapy   CapOx - Cycle 2 Day 1    Reviewed the expected side effects of chemotherapy and ways to manage them.   Blood counts acceptable. Results reviewed with patient. Plan:     Proceed with C#2 CAPOX (Oxaliplatin 130mg/m2 IV on day 1 and Capecitabine 850mg/m2 PO bid on day 1-14) given every 3 weeks   Labs prior to each cycle: CBC, BMP  Hepatic function panel every 6 weeks  Prophylactic antiemetics: Palonosetron and Dexamethasone prior to treatment and dexamethasone at home for 2 days after each treatment. PRN Antiemetics: Ondansetron and Prochlorperazine PRN at home  EMLA cream for port  Signatera CTC/MRD test  Follow-up in 3 weeks      Signed By: Aviva Doshi NP     February 27, 2023         Attestation of attending physician. I personally saw and evaluated the patient and performed the key components of medical decision making with Escobar Ram NP. I reviewed and verified the above documentation and modified it as needed. Mr. Jorge Lamas is a man with a diagnosis of Stage IIC colon ca. He is receiving adjuvant chemotherapy. Vitals is normal  Exam reveals a pleasant man  Chest is clear, heart sounds regular  Abdomen is soft  Alert and oriented     He will continue systemic therapy. I obtained history, performed physical exam, reviewed labs and imaging and communicated the treatment plan to the patient. Signed by: Margo Brody MD                     February 27, 2023          CC.  Brianne Kong MD

## 2023-02-28 ENCOUNTER — TELEPHONE (OUTPATIENT)
Dept: ONCOLOGY | Age: 58
End: 2023-02-28

## 2023-02-28 DIAGNOSIS — C18.7 CANCER OF SIGMOID COLON (HCC): Primary | ICD-10-CM

## 2023-02-28 NOTE — TELEPHONE ENCOUNTER
Patient is calling for a few reasons:    1: was suggested by [facility] he reconsider the port. Had a lot of discomfort and does not want to destroy his veins. 2. Having normal symptoms after chmeo but is also now have some neuropathy in his feet and a slight sore throat. Warm drinks okay but anything room temp or more hurts. 3. Looking for test results that was told should have been in on Friday. 4. Getting new insurance so needing to see if he can talk about upcoming treatments to see how this will work. Will have new deductible.

## 2023-02-28 NOTE — TELEPHONE ENCOUNTER
[address] [facility] [address], [address] [facility], 45 [facility], [address]  1965    Returned call to Mr. Marti Nava. Port ordered  MRD testing is not back and will take approximately 4 weeks for results since this is the first test.  Symptom of neuropathy are normal. Asked that he drink warm liquids and wear socks to keep his feet warm for the next few days until the oxaliplatin side effects wear off. He will let us know about insurance once he receives the information. I asked that he let us know ASAP if the insurance changes.        Signed By: Olga Morrow NP     February 28, 2023

## 2023-03-13 ENCOUNTER — TELEPHONE (OUTPATIENT)
Dept: ONCOLOGY | Age: 58
End: 2023-03-13

## 2023-03-13 RX ORDER — ACETAMINOPHEN 325 MG/1
650 TABLET ORAL AS NEEDED
Status: CANCELLED
Start: 2023-03-20

## 2023-03-13 RX ORDER — DIPHENHYDRAMINE HYDROCHLORIDE 50 MG/ML
50 INJECTION, SOLUTION INTRAMUSCULAR; INTRAVENOUS AS NEEDED
Status: CANCELLED
Start: 2023-03-20

## 2023-03-13 RX ORDER — DIPHENHYDRAMINE HYDROCHLORIDE 50 MG/ML
25 INJECTION, SOLUTION INTRAMUSCULAR; INTRAVENOUS AS NEEDED
Status: CANCELLED
Start: 2023-03-20

## 2023-03-13 RX ORDER — DEXTROSE MONOHYDRATE 50 MG/ML
5-250 INJECTION, SOLUTION INTRAVENOUS AS NEEDED
Status: CANCELLED | OUTPATIENT
Start: 2023-03-20

## 2023-03-13 RX ORDER — PALONOSETRON 0.05 MG/ML
0.25 INJECTION, SOLUTION INTRAVENOUS ONCE
Status: CANCELLED | OUTPATIENT
Start: 2023-03-20 | End: 2023-03-20

## 2023-03-13 RX ORDER — ONDANSETRON 2 MG/ML
8 INJECTION INTRAMUSCULAR; INTRAVENOUS AS NEEDED
Status: CANCELLED | OUTPATIENT
Start: 2023-03-20

## 2023-03-13 RX ORDER — EPINEPHRINE 1 MG/ML
0.3 INJECTION, SOLUTION, CONCENTRATE INTRAVENOUS AS NEEDED
Status: CANCELLED | OUTPATIENT
Start: 2023-03-20

## 2023-03-13 RX ORDER — ALBUTEROL SULFATE 0.83 MG/ML
2.5 SOLUTION RESPIRATORY (INHALATION) AS NEEDED
Status: CANCELLED
Start: 2023-03-20

## 2023-03-13 RX ORDER — HYDROCORTISONE SODIUM SUCCINATE 100 MG/2ML
100 INJECTION, POWDER, FOR SOLUTION INTRAMUSCULAR; INTRAVENOUS AS NEEDED
Status: CANCELLED | OUTPATIENT
Start: 2023-03-20

## 2023-03-13 NOTE — TELEPHONE ENCOUNTER
Returned a call to pt. HIPAA verified by two patient identifiers. Pt complaining of gas/bloating/cramps x last 3-4 days. He has lost 5-6 lbs in the last 4-5 days. He takes 2 imodiums pills in the AM and doesn't take more due to fear of getting constipated and that would make matters worse. He has semi-formed bowel movements in the mornings last 2 days then its liquid rest of the day. He has one more day left of xeloda before his off week. He stopped potassium 2 days ago due to he found one in his bag and he also saw potassium could cause some problems as well like he is having. He said the pain is worse with moving especially and eating does bother him too. He thinks this could be a bowel obstruction. No temperature /72 and 120's/80's. He is also dealing with allergies. He would like to know his MRD results as well.

## 2023-03-13 NOTE — TELEPHONE ENCOUNTER
Returned call to pt. Asked him to continue the xeloda tomorrow and see how he does on his off week. When he starts next cycle we will see how he does and may then need to discuss dose reduction. Offered him to come in for labs and fluids tomorrow. He asked if he could come in after his port placement tomorrow which is 0930. Educated him on his MRD being positive and  said it is mildly elevated and is hard to tell at this time and further tx and lab draws will be more helpful. He asked when his next draw is due if it can be done at the infusion center instead of his home. I will check his portal for when next draw is due and get this taken care of. Pt thanked me for the call. And is aware I will call him in the morning.

## 2023-03-13 NOTE — TELEPHONE ENCOUNTER
Patient left a voicemail stating that he would like a call back from the nurse.       # [phone]

## 2023-03-14 ENCOUNTER — HOSPITAL ENCOUNTER (OUTPATIENT)
Dept: INTERVENTIONAL RADIOLOGY/VASCULAR | Age: 58
Discharge: HOME | End: 2023-03-14
Attending: INTERNAL MEDICINE
Payer: COMMERCIAL

## 2023-03-14 ENCOUNTER — TELEPHONE (OUTPATIENT)
Dept: ONCOLOGY | Age: 58
End: 2023-03-14

## 2023-03-14 VITALS
BODY MASS INDEX: 28.29 KG/M2 | TEMPERATURE: 98.3 F | HEIGHT: 69 IN | WEIGHT: 191 LBS | RESPIRATION RATE: 18 BRPM | SYSTOLIC BLOOD PRESSURE: 122 MMHG | HEART RATE: 81 BPM | OXYGEN SATURATION: 98 % | DIASTOLIC BLOOD PRESSURE: 76 MMHG

## 2023-03-14 DIAGNOSIS — C18.7 CANCER OF SIGMOID COLON (HCC): Primary | ICD-10-CM

## 2023-03-14 DIAGNOSIS — C18.7 CANCER OF SIGMOID COLON (HCC): ICD-10-CM

## 2023-03-14 LAB
ALBUMIN SERPL-MCNC: 3.4 G/DL (ref 3.5–5)
ALBUMIN/GLOB SERPL: 0.9 (ref 1.1–2.2)
ALP SERPL-CCNC: 125 U/L (ref 45–117)
ALT SERPL-CCNC: 43 U/L (ref 12–78)
ANION GAP SERPL CALC-SCNC: 6 MMOL/L (ref 5–15)
AST SERPL-CCNC: 31 U/L (ref 15–37)
BILIRUB SERPL-MCNC: 1.3 MG/DL (ref 0.2–1)
BUN SERPL-MCNC: 7 MG/DL (ref 6–20)
BUN/CREAT SERPL: 9 (ref 12–20)
CALCIUM SERPL-MCNC: 8.7 MG/DL (ref 8.5–10.1)
CHLORIDE SERPL-SCNC: 106 MMOL/L (ref 97–108)
CO2 SERPL-SCNC: 25 MMOL/L (ref 21–32)
CREAT SERPL-MCNC: 0.77 MG/DL (ref 0.7–1.3)
GLOBULIN SER CALC-MCNC: 3.9 G/DL (ref 2–4)
GLUCOSE SERPL-MCNC: 120 MG/DL (ref 65–100)
POTASSIUM SERPL-SCNC: 3.7 MMOL/L (ref 3.5–5.1)
PROT SERPL-MCNC: 7.3 G/DL (ref 6.4–8.2)
SODIUM SERPL-SCNC: 137 MMOL/L (ref 136–145)

## 2023-03-14 PROCEDURE — C1769 GUIDE WIRE: HCPCS

## 2023-03-14 PROCEDURE — 74011250636 HC RX REV CODE- 250/636: Performed by: STUDENT IN AN ORGANIZED HEALTH CARE EDUCATION/TRAINING PROGRAM

## 2023-03-14 PROCEDURE — 36561 INSERT TUNNELED CV CATH: CPT

## 2023-03-14 PROCEDURE — 2709999900 HC NON-CHARGEABLE SUPPLY

## 2023-03-14 PROCEDURE — C1892 INTRO/SHEATH,FIXED,PEEL-AWAY: HCPCS

## 2023-03-14 PROCEDURE — 77030031139 HC SUT VCRL2 J&J -A

## 2023-03-14 PROCEDURE — 80053 COMPREHEN METABOLIC PANEL: CPT

## 2023-03-14 PROCEDURE — 77030010507 HC ADH SKN DERMBND J&J -B

## 2023-03-14 PROCEDURE — 74011250636 HC RX REV CODE- 250/636: Performed by: NURSE PRACTITIONER

## 2023-03-14 PROCEDURE — 74011000250 HC RX REV CODE- 250: Performed by: STUDENT IN AN ORGANIZED HEALTH CARE EDUCATION/TRAINING PROGRAM

## 2023-03-14 PROCEDURE — 36415 COLL VENOUS BLD VENIPUNCTURE: CPT

## 2023-03-14 PROCEDURE — C1788 PORT, INDWELLING, IMP: HCPCS

## 2023-03-14 RX ORDER — FENTANYL CITRATE 50 UG/ML
100 INJECTION, SOLUTION INTRAMUSCULAR; INTRAVENOUS
Status: DISCONTINUED | OUTPATIENT
Start: 2023-03-14 | End: 2023-03-14

## 2023-03-14 RX ORDER — HEPARIN 100 UNIT/ML
500 SYRINGE INTRAVENOUS ONCE
Status: COMPLETED | OUTPATIENT
Start: 2023-03-14 | End: 2023-03-14

## 2023-03-14 RX ORDER — LIDOCAINE HYDROCHLORIDE AND EPINEPHRINE 10; 10 MG/ML; UG/ML
20 INJECTION, SOLUTION INFILTRATION; PERINEURAL ONCE
Status: COMPLETED | OUTPATIENT
Start: 2023-03-14 | End: 2023-03-14

## 2023-03-14 RX ORDER — LIDOCAINE HYDROCHLORIDE 20 MG/ML
20 INJECTION, SOLUTION INFILTRATION; PERINEURAL ONCE
Status: COMPLETED | OUTPATIENT
Start: 2023-03-14 | End: 2023-03-14

## 2023-03-14 RX ORDER — MIDAZOLAM HYDROCHLORIDE 1 MG/ML
1-5 INJECTION, SOLUTION INTRAMUSCULAR; INTRAVENOUS
Status: DISCONTINUED | OUTPATIENT
Start: 2023-03-14 | End: 2023-03-14

## 2023-03-14 RX ORDER — HEPARIN SODIUM 200 [USP'U]/100ML
400 INJECTION, SOLUTION INTRAVENOUS ONCE
Status: COMPLETED | OUTPATIENT
Start: 2023-03-14 | End: 2023-03-14

## 2023-03-14 RX ADMIN — FENTANYL CITRATE 50 MCG: 50 INJECTION, SOLUTION INTRAMUSCULAR; INTRAVENOUS at 11:05

## 2023-03-14 RX ADMIN — MIDAZOLAM 1 MG: 1 INJECTION INTRAMUSCULAR; INTRAVENOUS at 11:17

## 2023-03-14 RX ADMIN — MIDAZOLAM 2 MG: 1 INJECTION INTRAMUSCULAR; INTRAVENOUS at 11:05

## 2023-03-14 RX ADMIN — CEFAZOLIN 2 G: 1 INJECTION, POWDER, FOR SOLUTION INTRAMUSCULAR; INTRAVENOUS at 10:50

## 2023-03-14 RX ADMIN — MIDAZOLAM 2 MG: 1 INJECTION INTRAMUSCULAR; INTRAVENOUS at 11:12

## 2023-03-14 RX ADMIN — SODIUM CHLORIDE, PRESERVATIVE FREE 500 UNITS: 5 INJECTION INTRAVENOUS at 11:13

## 2023-03-14 RX ADMIN — LIDOCAINE HYDROCHLORIDE 14 ML: 20 INJECTION, SOLUTION INFILTRATION; PERINEURAL at 11:13

## 2023-03-14 RX ADMIN — SODIUM CHLORIDE 1000 ML: 9 INJECTION, SOLUTION INTRAVENOUS at 09:52

## 2023-03-14 RX ADMIN — HEPARIN SODIUM IN SODIUM CHLORIDE 60 ML: 200 INJECTION INTRAVENOUS at 11:13

## 2023-03-14 RX ADMIN — FENTANYL CITRATE 25 MCG: 50 INJECTION, SOLUTION INTRAMUSCULAR; INTRAVENOUS at 11:11

## 2023-03-14 RX ADMIN — LIDOCAINE HYDROCHLORIDE AND EPINEPHRINE 8 ML: 10; 10 INJECTION, SOLUTION INFILTRATION; PERINEURAL at 11:13

## 2023-03-14 RX ADMIN — FENTANYL CITRATE 25 MCG: 50 INJECTION, SOLUTION INTRAMUSCULAR; INTRAVENOUS at 11:17

## 2023-03-14 NOTE — ROUTINE PROCESS
Received care of pt from [facility] to radiology recovery area for port placement. Pt alert and being prepped for procedure. Awaiting MD for consent.

## 2023-03-14 NOTE — TELEPHONE ENCOUNTER
[address] [facility] [address], 210 [facility], 45 [facility], [address]  1965    Returned call and reviewed labs. He has a follow up with colorectal surgeon on Thursday. Referral to Rick PETERS.     Signed By: Daya Melo NP     March 14, 2023

## 2023-03-14 NOTE — TELEPHONE ENCOUNTER
Abdullahi Sterling called and is asking about the labs that were done today before his port was put in.

## 2023-03-14 NOTE — H&P
Radiology History and Physical    Patient: Marcia Cole 65 y.o. male       Chief Complaint: No chief complaint on file. History of Present Illness: 65year old male with colon cancer, presents for port placement    History:    Past Medical History:   Diagnosis Date    Cancer ([facility] [address] 2023    colon    Hypercholesterolemia     Psoriasis      Family History   Problem Relation Age of Onset    Crohn's Disease Sister     Cancer Paternal Grandfather         colon     Social History     Socioeconomic History    Marital status:      Spouse name: Not on file    Number of children: Not on file    Years of education: Not on file    Highest education level: Not on file   Occupational History    Not on file   Tobacco Use    Smoking status: Every Day     Packs/day: 0.50     Types: Cigarettes    Smokeless tobacco: Never   Substance and Sexual Activity    Alcohol use: Yes    Drug use: Not on file    Sexual activity: Not on file   Other Topics Concern    Not on file   Social History Narrative    Not on file     Social Determinants of Health     Financial Resource Strain: Not on file   Food Insecurity: Not on file   Transportation Needs: Not on file   Physical Activity: Not on file   Stress: Not on file   Social Connections: Not on file   Intimate Partner Violence: Not on file   Housing Stability: Not on file       Allergies: No Known Allergies    Current Medications:  Current Outpatient Medications   Medication Sig    apremilast (Otezla) 30 mg tab Take 1 mg by mouth once over twenty-four (24) hours. ondansetron (ZOFRAN ODT) 4 mg disintegrating tablet Take 1 Tablet by mouth every eight (8) hours as needed for Nausea or Vomiting. prochlorperazine (COMPAZINE) 10 mg tablet Take 0.5 Tablets by mouth every six (6) hours as needed for Nausea. No current facility-administered medications for this encounter. Physical Exam:  Blood pressure 122/76, pulse 81, temperature 98.3 °F (36.8 °C), resp.  rate 18, height 5' 9\" (1.753 m), weight 86.6 kg (191 lb), SpO2 98 %. GENERAL: alert, cooperative, no distress, appears stated age,   LUNG: Nonlabored respiration on room air  HEART: regular rate and rhythm    Alerts:      Laboratory:      Recent Labs     03/14/23  0942   BUN 7   CREA 0.77   K 3.7         Plan of Care/Planned Procedure:  Risks, benefits, and alternatives reviewed with patient and he agrees to proceed with the procedure. Port placement    Deemed appropriate for moderate sedation with versed and fentanyl.     Herve Melendez MD  Interventional Radiology  [facility]  3:05 PM, 3/14/2023

## 2023-03-14 NOTE — PROCEDURES
Interventional Radiology  Procedure Note        3/14/2023 3:05 PM    Patient: Lawerance Gaucher     Informed consent obtained    Diagnosis: Colon cancer    Procedure(s): port placement    Specimens removed:  none    Complications: None    Primary Physician: Zaid Ayon MD    Recommendations: N/A    Discharge Disposition: Stable; discharge to home    Full dictated report to follow    Ziad Ayon MD  Interventional Radiology  [facility]  3:05 PM, 3/14/2023

## 2023-03-14 NOTE — DISCHARGE INSTRUCTIONS
[address] [facility]  Angiography Department      Radiologist:  Dr. Patti Witt       Date: 03/14/2023      [facility] Discharge Instructions      Watch for signs of infection:    Redness,   Fever, chills,   Increased pain, and/or drainage from the site. If this occurs, call your physician at once. If you have an appointment with a provider or at the infusion center in the upcoming week,  they may check your site and change the dressing for you. Keep your dressing clean and dry. Leave the dressing in place for the next 5 days. After 5 days you may remove the dressing and leave the site uncovered. Wash gently over incision site while it is healing, you will notice the shiny glue start to flake off. DO NOT PICK at the glue because it can cause the wound to re open and infection can occur. The glue will naturally come off on its own. Continue your previous diet and restart your regularly prescribed medications. You may take Tylenol, as directed on the label, for pain if needed. Because you received sedation, you are not to drive or sign any legal documents until tomorrow at 6 am..    Do not lift anything heavier than 5 pounds with the affected arm and avoid pushing and pulling movements for 3 days. If you have any questions or concerns, please call our radiology department at [phone].

## 2023-03-17 NOTE — PROGRESS NOTES
Eduardo Bennett is a 65 y.o. male here for follow up for colon carcinoma. Treatment today:  Cycle 3 Day 1 CAPOX  Pt has lost 10 lbs since last visit. Pt reports numbness/tingling in feet, fatigue, weakness, SOB when working, diarrhea, no appetite. This 2nd round of treatment was much worse than 1st.   Pt needs lidocaine cream.     1. Have you been to the ER, urgent care clinic since your last visit? Hospitalized since your last visit? Brigitte Sharif 3/16/23 -scan shows Inflamed intestine    2. Have you seen or consulted any other health care providers outside of the [address] [place] [address] since your last visit? Include any pap smears or colon screening.   no

## 2023-03-20 ENCOUNTER — HOSPITAL ENCOUNTER (OUTPATIENT)
Dept: INFUSION THERAPY | Age: 58
Discharge: HOME | End: 2023-03-20
Payer: COMMERCIAL

## 2023-03-20 ENCOUNTER — OFFICE VISIT (OUTPATIENT)
Dept: ONCOLOGY | Age: 58
End: 2023-03-20

## 2023-03-20 VITALS
HEIGHT: 69 IN | SYSTOLIC BLOOD PRESSURE: 138 MMHG | OXYGEN SATURATION: 97 % | DIASTOLIC BLOOD PRESSURE: 98 MMHG | WEIGHT: 181.2 LBS | BODY MASS INDEX: 26.84 KG/M2 | RESPIRATION RATE: 18 BRPM | TEMPERATURE: 97.7 F | HEART RATE: 77 BPM

## 2023-03-20 VITALS
OXYGEN SATURATION: 97 % | RESPIRATION RATE: 18 BRPM | TEMPERATURE: 97.7 F | SYSTOLIC BLOOD PRESSURE: 101 MMHG | HEIGHT: 69 IN | WEIGHT: 181.22 LBS | DIASTOLIC BLOOD PRESSURE: 69 MMHG | HEART RATE: 77 BPM | BODY MASS INDEX: 26.84 KG/M2

## 2023-03-20 DIAGNOSIS — Z51.11 ENCOUNTER FOR ANTINEOPLASTIC CHEMOTHERAPY: ICD-10-CM

## 2023-03-20 DIAGNOSIS — K52.9 COLITIS: ICD-10-CM

## 2023-03-20 DIAGNOSIS — C18.9 MALIGNANT NEOPLASM OF COLON, UNSPECIFIED PART OF COLON (HCC): Primary | ICD-10-CM

## 2023-03-20 DIAGNOSIS — C18.7 CANCER OF SIGMOID COLON (HCC): Primary | ICD-10-CM

## 2023-03-20 LAB
ALBUMIN SERPL-MCNC: 3.5 G/DL (ref 3.5–5)
ALBUMIN/GLOB SERPL: 1.1 (ref 1.1–2.2)
ALP SERPL-CCNC: 124 U/L (ref 45–117)
ALT SERPL-CCNC: 36 U/L (ref 12–78)
ANION GAP SERPL CALC-SCNC: 5 MMOL/L (ref 5–15)
AST SERPL-CCNC: 29 U/L (ref 15–37)
BASO+EOS+MONOS # BLD AUTO: 1.6 K/UL (ref 0.2–1.2)
BASO+EOS+MONOS NFR BLD AUTO: 24 % (ref 3.2–16.9)
BILIRUB SERPL-MCNC: 0.7 MG/DL (ref 0.2–1)
BUN SERPL-MCNC: 7 MG/DL (ref 6–20)
BUN/CREAT SERPL: 10 (ref 12–20)
CALCIUM SERPL-MCNC: 8.4 MG/DL (ref 8.5–10.1)
CHLORIDE SERPL-SCNC: 102 MMOL/L (ref 97–108)
CO2 SERPL-SCNC: 29 MMOL/L (ref 21–32)
CREAT SERPL-MCNC: 0.68 MG/DL (ref 0.7–1.3)
DIFFERENTIAL METHOD BLD: ABNORMAL
ERYTHROCYTE [DISTWIDTH] IN BLOOD BY AUTOMATED COUNT: 18.1 % (ref 11.8–15.8)
GLOBULIN SER CALC-MCNC: 3.1 G/DL (ref 2–4)
GLUCOSE SERPL-MCNC: 100 MG/DL (ref 65–100)
HCT VFR BLD AUTO: 41.7 % (ref 36.6–50.3)
HGB BLD-MCNC: 15 G/DL (ref 12.1–17)
LYMPHOCYTES # BLD: 1.8 K/UL (ref 0.8–3.5)
LYMPHOCYTES NFR BLD: 27 % (ref 12–49)
MCH RBC QN AUTO: 35.6 PG (ref 26–34)
MCHC RBC AUTO-ENTMCNC: 36 G/DL (ref 30–36.5)
MCV RBC AUTO: 99 FL (ref 80–99)
NEUTS SEG # BLD: 3.2 K/UL (ref 1.8–8)
NEUTS SEG NFR BLD: 49 % (ref 32–75)
PLATELET # BLD AUTO: 118 K/UL (ref 150–400)
POTASSIUM SERPL-SCNC: 3.4 MMOL/L (ref 3.5–5.1)
PROT SERPL-MCNC: 6.6 G/DL (ref 6.4–8.2)
RBC # BLD AUTO: 4.21 M/UL (ref 4.1–5.7)
SODIUM SERPL-SCNC: 136 MMOL/L (ref 136–145)
WBC # BLD AUTO: 6.6 K/UL (ref 4.1–11.1)

## 2023-03-20 PROCEDURE — 77030012965 HC NDL HUBR BBMI -A

## 2023-03-20 PROCEDURE — 85025 COMPLETE CBC W/AUTO DIFF WBC: CPT

## 2023-03-20 PROCEDURE — 80053 COMPREHEN METABOLIC PANEL: CPT

## 2023-03-20 PROCEDURE — 36591 DRAW BLOOD OFF VENOUS DEVICE: CPT

## 2023-03-20 PROCEDURE — 74011000250 HC RX REV CODE- 250: Performed by: INTERNAL MEDICINE

## 2023-03-20 PROCEDURE — 74011250636 HC RX REV CODE- 250/636: Performed by: INTERNAL MEDICINE

## 2023-03-20 PROCEDURE — 99215 OFFICE O/P EST HI 40 MIN: CPT | Performed by: INTERNAL MEDICINE

## 2023-03-20 RX ORDER — HEPARIN 100 UNIT/ML
500 SYRINGE INTRAVENOUS AS NEEDED
Status: ACTIVE | OUTPATIENT
Start: 2023-03-20 | End: 2023-03-20

## 2023-03-20 RX ORDER — SODIUM CHLORIDE 9 MG/ML
5-40 INJECTION INTRAVENOUS AS NEEDED
Status: ACTIVE | OUTPATIENT
Start: 2023-03-20 | End: 2023-03-20

## 2023-03-20 RX ORDER — SODIUM CHLORIDE 0.9 % (FLUSH) 0.9 %
5-40 SYRINGE (ML) INJECTION AS NEEDED
Status: DISPENSED | OUTPATIENT
Start: 2023-03-20 | End: 2023-03-20

## 2023-03-20 RX ORDER — METRONIDAZOLE 500 MG/1
500 TABLET ORAL 3 TIMES DAILY
Qty: 30 TABLET | Refills: 0 | Status: SHIPPED | OUTPATIENT
Start: 2023-03-20 | End: 2023-03-30

## 2023-03-20 RX ORDER — SODIUM CHLORIDE 9 MG/ML
5-250 INJECTION, SOLUTION INTRAVENOUS AS NEEDED
Status: DISPENSED | OUTPATIENT
Start: 2023-03-20 | End: 2023-03-20

## 2023-03-20 RX ORDER — LIDOCAINE AND PRILOCAINE 25; 25 MG/G; MG/G
CREAM TOPICAL AS NEEDED
Qty: 30 G | Refills: 0 | Status: SHIPPED | OUTPATIENT
Start: 2023-03-20

## 2023-03-20 RX ADMIN — SODIUM CHLORIDE, PRESERVATIVE FREE 10 ML: 5 INJECTION INTRAVENOUS at 08:20

## 2023-03-20 RX ADMIN — SODIUM CHLORIDE, PRESERVATIVE FREE 10 ML: 5 INJECTION INTRAVENOUS at 09:43

## 2023-03-20 RX ADMIN — HEPARIN 500 UNITS: 100 SYRINGE at 09:43

## 2023-03-20 NOTE — PROGRESS NOTES
[address] [facility] [address], [address] [facility], [address] [place] [address], [address] [place] [address]  [phone]    Oncology Note      Patient: Ayesha Trujillo MRN: [id number]  SSN: [id number]    YOB: 1965  Age: 65 y.o. Sex: male        Diagnosis:     1. Colon adenocarcinoma, sigmoid and moderately differentiated:   T4 N0 M0 (Stage IIC)       Treatment:      1. Adjuvant chemotherapy   CapOx - 2 Cycles    Subjective:      Ayesha Trujillo is a 65 y.o. male who I am seeing for a new diagnosis of colon carcinoma. He was noted to have diverticulitis which did not respond to antibiotics treatment. He underwent a left hemicolectomy. The pathology reveals colon adenocarcinoma. He comes in to discuss the next steps. 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 He is here today for his third cycle of adjuvant chemotherapy with CapOx. He continues to have diarrhea. CT done at [facility] showed colitis. He has lost more weight. Review of Systems:    Constitutional: negative  Eyes: negative  Ears, Nose, Mouth, Throat, and Face: negative  Respiratory: negative  Cardiovascular: negative  Gastrointestinal: diarrhea  Genitourinary:negative  Integument/Breast: negative  Hematologic/Lymphatic: negative  Musculoskeletal:negative  Neurological: negative    Review of systems was reviewed and updated as needed on 03/20/23.       Past Medical History:   Diagnosis Date    Cancer ([address] 2023    colon    Hypercholesterolemia     Psoriasis      Past Surgical History:   Procedure Laterality Date    HX OTHER SURGICAL      Laparoscopic inguinal hernia repair    IR INSERT TUNL CVC W PORT OVER 5 YEARS  3/14/2023      Family History   Problem Relation Age of Onset    Crohn's Disease Sister     Cancer Paternal Grandfather         colon     Social History     Tobacco Use    Smoking status: Every Day     Packs/day: 0.50     Types: Cigarettes    Smokeless tobacco: Never   Substance Use Topics    Alcohol use: Yes      Prior to Admission medications    Medication Sig Start Date End Date Taking? Authorizing Provider   metroNIDAZOLE (FLAGYL) 500 mg tablet Take 1 Tablet by mouth three (3) times daily for 10 days. 3/20/23 3/30/23 Yes Bela Briones NP   lidocaine-prilocaine (EMLA) topical cream Apply  to affected area as needed for Pain. Apply small amount over port 30 minutes prior to chemotherapy 3/20/23  Yes Micheline Montana NP   apremilast Kumar Cox) 30 mg tab Take 1 mg by mouth once over twenty-four (24) hours. Provider, Liliana   ondansetron (ZOFRAN ODT) 4 mg disintegrating tablet Take 1 Tablet by mouth every eight (8) hours as needed for Nausea or Vomiting. 2/6/23   Boom Sherwood MD   prochlorperazine (COMPAZINE) 10 mg tablet Take 0.5 Tablets by mouth every six (6) hours as needed for Nausea. 2/6/23   Boom Sherwood MD          No Known Allergies        Objective:     Visit Vitals  /69 (BP 1 Location: Left upper arm, BP Patient Position: Sitting)   Pulse 77   Temp 97.7 °F (36.5 °C)   Resp 18   Ht 5' 9\" (1.753 m)   Wt 181 lb 3.5 oz (82.2 kg)   SpO2 97%   BMI 26.76 kg/m²     Pain Scale: 5/10      Physical Exam:    GENERAL: alert, cooperative, no distress, appears stated age  EYE: conjunctivae/corneas clear. LYMPHATIC: Cervical, supraclavicular, and axillary nodes normal.   THROAT & NECK: normal and no erythema or exudates noted. LUNG: clear to auscultation bilaterally  HEART: regular rate and rhythm  ABDOMEN: soft, non-tender. EXTREMITIES:  extremities normal, atraumatic, no cyanosis or edema  SKIN: no rash or abnormalities  NEUROLOGIC: AOx3. Gait normal.     The above physical exam was reviewed and updated as needed on 03/20/23.       Lab Results   Component Value Date/Time    WBC 6.6 03/20/2023 08:28 AM    HGB 15.0 03/20/2023 08:28 AM    HCT 41.7 03/20/2023 08:28 AM    PLATELET 662 (L) 59/88/3238 08:28 AM    MCV 99.0 03/20/2023 08:28 AM         Lab Results   Component Value Date/Time    Sodium 136 03/20/2023 08:28 AM    Potassium 3.4 (L) 03/20/2023 08:28 AM    Chloride 102 03/20/2023 08:28 AM    CO2 29 03/20/2023 08:28 AM    Anion gap 5 03/20/2023 08:28 AM    Glucose 100 03/20/2023 08:28 AM    BUN 7 03/20/2023 08:28 AM    Creatinine 0.68 (L) 03/20/2023 08:28 AM    BUN/Creatinine ratio 10 (L) 03/20/2023 08:28 AM    Calcium 8.4 (L) 03/20/2023 08:28 AM    Bilirubin, total 0.7 03/20/2023 08:28 AM    Alk. phosphatase 124 (H) 03/20/2023 08:28 AM    Protein, total 6.6 03/20/2023 08:28 AM    Albumin 3.5 03/20/2023 08:28 AM    Globulin 3.1 03/20/2023 08:28 AM    A-G Ratio 1.1 03/20/2023 08:28 AM    ALT (SGPT) 36 03/20/2023 08:28 AM    AST (SGOT) 29 03/20/2023 08:28 AM       Assessment:     1. Colon adenocarcinoma, sigmoid and moderately differentiated:   T4 N0 M0 (Stage IIC)        ECOG PS 0  Intent of treatment - curative  Prognosis: Good    S/P left hemicolectomy 12/21/2022  Perforated colon cancer    Signatera 2/16/2023 - 0.41 Positive     Due to perforated colon cancer and T4 disease, the risk of recurrence is significant. I recommend adjuvant chemotherapy. Due to work constraints, I recommend adjuvant chemotherapy with CapOx for at least 3 months. Adjuvant chemotherapy   CapOx - 2 Cycles    He has developed diarrhea and colitis from [address] treatment  Treat with Flagyl and anti-diarrheals  Blood counts are acceptable. Results reviewed with the patient. Symptom management form reviewed with patient. 2. Colitis    Flagyl 500 mg tid x 10 days  Hold chemotherapy   Dose reduce Capecitabine next cycle      Plan:     Hold CAPOX (Oxaliplatin 130mg/m2 IV on day 1 and Capecitabine 850mg/m2 PO bid on day 1-14) given every 3 weeks   Labs prior to each cycle: CBC, BMP  Hepatic function panel every 6 weeks  Prophylactic antiemetics: Palonosetron and Dexamethasone prior to treatment and dexamethasone at home for 2 days after each treatment.    PRN Antiemetics: Ondansetron and Prochlorperazine PRN at home  Flagyl 500 mg tid   Follow-up in 3 weeks      Signed By: Isabel Le NP     March 20, 2023         Attestation of attending physician. I personally saw and evaluated the patient and performed the key components of medical decision making with Hreon Hemphill NP. I reviewed and verified the above documentation and modified it as needed. Mr. Tierra Bey is a man with a diagnosis of colon carcinoma. He is receiving adjuvant chemotherapy. Vitals is normal  Exam reveals pleasant man  Chest is clear, heart sounds regular  Abdomen is soft  Alert and oriented     MRD test is +ve. Due to moderate colitis from [place], I recommend hold treatment for 3 weeks, treat with Flagyl. Reduce dose of [place] on resumption. I obtained history, performed physical exam, reviewed labs and imaging and communicated the treatment plan to the patient. Signed by: Evelia Pat MD                     March 20, 2023        CC.  Annmarie Ramirez MD

## 2023-03-20 NOTE — LETTER
3/20/2023    Patient: Renee Thorpe   YOB: 1965   Date of Visit: 3/20/2023     [place] [id number]  Via Fax: [phone]    Dear Melvi Choudhury DO,      Thank you for referring Mr. Renee Thorpe to [address] [place] [address] for evaluation. My notes for this consultation are attached. If you have questions, please do not hesitate to call me. I look forward to following your patient along with you.       Sincerely,    Vasu Shaw MD

## 2023-03-20 NOTE — PROGRESS NOTES
Pt arrived to [facility] ambulatory in no acute distress at [facility] for Capox C3. Assessment completed; pt reports increased cold sensitivity, neuropathy, fatigue, and nausea/diarrhea. Pt went to ED last Thursday for a CT scan to r/o a blockage that came back negative. Still having a lot of abdominal pain. R chest port accessed without issue and positive blood return noted. Labs obtained, CBCap and CMP. MRD kit was suppose to be brought by patient to be drawn but he says they did not receive one. Patient to MD office for apt. Treatment HELD x 3 weeks. Pt given Flagyl antibiotic. Patient Vitals for the past 12 hrs:   Temp Pulse Resp BP SpO2   03/20/23 0816 97.7 °F (36.5 °C) 77 18 (!) 138/98 97 %      Recent Results (from the past 12 hour(s))   CBC WITH 3 PART DIFF    Collection Time: 03/20/23  8:28 AM   Result Value Ref Range    WBC 6.6 4.1 - 11.1 K/uL    RBC 4.21 4.10 - 5.70 M/uL    HGB 15.0 12.1 - 17.0 g/dL    HCT 41.7 36.6 - 50.3 %    MCV 99.0 80.0 - 99.0 FL    MCH 35.6 (H) 26.0 - 34.0 PG    MCHC 36.0 30.0 - 36.5 g/dL    RDW 18.1 (H) 11.8 - 15.8 %    PLATELET 575 (L) 152 - 400 K/uL    NEUTROPHILS 49 32 - 75 %    Mixed cells 24 (H) 3.2 - 16.9 %    LYMPHOCYTES 27 12 - 49 %    ABS. NEUTROPHILS 3.2 1.8 - 8.0 K/UL    ABS. MIXED CELLS 1.6 (H) 0.2 - 1.2 K/uL    ABS. LYMPHOCYTES 1.8 0.8 - 3.5 K/UL    DF AUTOMATED     METABOLIC PANEL, COMPREHENSIVE    Collection Time: 03/20/23  8:28 AM   Result Value Ref Range    Sodium 136 136 - 145 mmol/L    Potassium 3.4 (L) 3.5 - 5.1 mmol/L    Chloride 102 97 - 108 mmol/L    CO2 29 21 - 32 mmol/L    Anion gap 5 5 - 15 mmol/L    Glucose 100 65 - 100 mg/dL    BUN 7 6 - 20 MG/DL    Creatinine 0.68 (L) 0.70 - 1.30 MG/DL    BUN/Creatinine ratio 10 (L) 12 - 20      eGFR >60 >60 ml/min/1.73m2    Calcium 8.4 (L) 8.5 - 10.1 MG/DL    Bilirubin, total 0.7 0.2 - 1.0 MG/DL    ALT (SGPT) 36 12 - 78 U/L    AST (SGOT) 29 15 - 37 U/L    Alk.  phosphatase 124 (H) 45 - 117 U/L    Protein, total 6.6 6.4 - 8.2 g/dL    Albumin 3.5 3.5 - 5.0 g/dL    Globulin 3.1 2.0 - 4.0 g/dL    A-G Ratio 1.1 1.1 - 2.2        The following medications administered:  Medications Administered       0.9% sodium chloride injection 5-40 mL       Admin Date  03/20/2023 Action  Given Dose  10 mL Route  IntraVENous Administered By  Alfonso Ayon RN              heparin (porcine) pf 500 Units       Admin Date  03/20/2023 Action  Given Dose  500 Units Route  InterCATHeter Administered By  Alfonso Ayon RN              sodium chloride (NS) flush 5-40 mL       Admin Date  03/20/2023 Action  Given Dose  10 mL Route  IntraVENous Administered By  Alfonso Ayon RN                     Pt tolerated treatment well. Port flushed per policy and removed, 2x2 and coban placed. Pt discharged ambulatory in no acute distress at 0950, accompanied by spouse. Next appointment 4/10/23.

## 2023-03-21 ENCOUNTER — TELEPHONE (OUTPATIENT)
Dept: ONCOLOGY | Age: 58
End: 2023-03-21

## 2023-03-21 NOTE — TELEPHONE ENCOUNTER
[address] [facility] [facility], [address] [facility]   W: [phone]  F: [phone]      Medical Nutrition Therapy  Nutrition Referral:    Referral received. Patient with diagnosis of colon carcinoma. He recently underwent sigmoid colectomy and colostomy on 12/21/23. Called and spoke with patient,  explained that RD is available to address nutrition throughout the spectrum of care. He is struggling with diarrhea, taking lomotil daily. He reports he does have to force himself to eat and usually food goes right through him. For breakfast this morning he had sausage biscuit w/ potato rounds. Yesterday for dinner he had baked spaghetti w/ sweet potato and lunch was a chicken sandwich from [employer]. Discussed foods that could thicken stool. Will send information via [email or web address]. Chemotherapy Flowsheet 3/20/2023   Cycle C3D1   Date 3/20/2023   Drug / Regimen Capox   Pre Meds -   Notes HELD       Wt Readings from Last 5 Encounters:   03/20/23 181 lb 3.5 oz (82.2 kg)   03/20/23 181 lb 3.2 oz (82.2 kg)   03/14/23 191 lb (86.6 kg)   02/27/23 191 lb 11.2 oz (87 kg)   02/27/23 191 lb (86.6 kg)     Significant weight loss x 3 weeks.      Meets Criteria for Acute Malnutrition   [x]Moderate Malnutrition, as evidenced by:   [] Mild muscle wasting, loss of subcutaneous fat   [x] Nutritional intake <75% of recommended intake for >1 week   [x] Weight loss of 1-2% in 1 week, 5% in 1 month, 7.5% in 3 months, or 10% in 6 months   [] Mild edema      Estimated Nutrition Needs:   Energy (kcal/day): 2460-2870kcal/day   Protein (g/day): 110 (1.3 gm/kg (20%))    Plan:   - [employer] message to aid in ostomy   Signed By: Liegh Ann Carbajal, [email or web address]

## 2023-03-31 RX ORDER — HYDROCORTISONE SODIUM SUCCINATE 100 MG/2ML
100 INJECTION, POWDER, FOR SOLUTION INTRAMUSCULAR; INTRAVENOUS AS NEEDED
Status: CANCELLED | OUTPATIENT
Start: 2023-04-10

## 2023-03-31 RX ORDER — ALBUTEROL SULFATE 0.83 MG/ML
2.5 SOLUTION RESPIRATORY (INHALATION) AS NEEDED
Status: CANCELLED
Start: 2023-04-10

## 2023-03-31 RX ORDER — DIPHENHYDRAMINE HYDROCHLORIDE 50 MG/ML
50 INJECTION, SOLUTION INTRAMUSCULAR; INTRAVENOUS AS NEEDED
Status: CANCELLED
Start: 2023-04-10

## 2023-03-31 RX ORDER — HYDROCORTISONE SODIUM SUCCINATE 100 MG/2ML
100 INJECTION, POWDER, FOR SOLUTION INTRAMUSCULAR; INTRAVENOUS AS NEEDED
OUTPATIENT
Start: 2023-05-01

## 2023-03-31 RX ORDER — PALONOSETRON 0.05 MG/ML
0.25 INJECTION, SOLUTION INTRAVENOUS ONCE
OUTPATIENT
Start: 2023-05-01 | End: 2023-05-01

## 2023-03-31 RX ORDER — ONDANSETRON 2 MG/ML
8 INJECTION INTRAMUSCULAR; INTRAVENOUS AS NEEDED
OUTPATIENT
Start: 2023-05-01

## 2023-03-31 RX ORDER — SODIUM CHLORIDE 9 MG/ML
5-250 INJECTION, SOLUTION INTRAVENOUS AS NEEDED
OUTPATIENT
Start: 2023-05-01

## 2023-03-31 RX ORDER — DIPHENHYDRAMINE HYDROCHLORIDE 50 MG/ML
50 INJECTION, SOLUTION INTRAMUSCULAR; INTRAVENOUS AS NEEDED
Start: 2023-05-01

## 2023-03-31 RX ORDER — DEXTROSE MONOHYDRATE 50 MG/ML
5-250 INJECTION, SOLUTION INTRAVENOUS AS NEEDED
OUTPATIENT
Start: 2023-05-01

## 2023-03-31 RX ORDER — ALBUTEROL SULFATE 0.83 MG/ML
2.5 SOLUTION RESPIRATORY (INHALATION) AS NEEDED
Start: 2023-05-01

## 2023-03-31 RX ORDER — SODIUM CHLORIDE 0.9 % (FLUSH) 0.9 %
5-40 SYRINGE (ML) INJECTION AS NEEDED
OUTPATIENT
Start: 2023-05-01

## 2023-03-31 RX ORDER — ONDANSETRON 2 MG/ML
8 INJECTION INTRAMUSCULAR; INTRAVENOUS AS NEEDED
Status: CANCELLED | OUTPATIENT
Start: 2023-04-10

## 2023-03-31 RX ORDER — HEPARIN 100 UNIT/ML
500 SYRINGE INTRAVENOUS AS NEEDED
Start: 2023-05-01

## 2023-03-31 RX ORDER — EPINEPHRINE 1 MG/ML
0.3 INJECTION, SOLUTION, CONCENTRATE INTRAVENOUS AS NEEDED
OUTPATIENT
Start: 2023-05-01

## 2023-03-31 RX ORDER — ACETAMINOPHEN 325 MG/1
650 TABLET ORAL AS NEEDED
Status: CANCELLED
Start: 2023-04-10

## 2023-03-31 RX ORDER — DIPHENHYDRAMINE HYDROCHLORIDE 50 MG/ML
25 INJECTION, SOLUTION INTRAMUSCULAR; INTRAVENOUS AS NEEDED
Start: 2023-05-01

## 2023-03-31 RX ORDER — EPINEPHRINE 1 MG/ML
0.3 INJECTION, SOLUTION, CONCENTRATE INTRAVENOUS AS NEEDED
Status: CANCELLED | OUTPATIENT
Start: 2023-04-10

## 2023-03-31 RX ORDER — SODIUM CHLORIDE 9 MG/ML
5-40 INJECTION INTRAVENOUS AS NEEDED
OUTPATIENT
Start: 2023-05-01

## 2023-03-31 RX ORDER — ACETAMINOPHEN 325 MG/1
650 TABLET ORAL AS NEEDED
Start: 2023-05-01

## 2023-03-31 RX ORDER — DIPHENHYDRAMINE HYDROCHLORIDE 50 MG/ML
25 INJECTION, SOLUTION INTRAMUSCULAR; INTRAVENOUS AS NEEDED
Status: CANCELLED
Start: 2023-04-10

## 2023-04-10 ENCOUNTER — HOSPITAL ENCOUNTER (OUTPATIENT)
Dept: INFUSION THERAPY | Age: 58
Discharge: HOME | End: 2023-04-10
Payer: COMMERCIAL

## 2023-04-10 VITALS
HEART RATE: 65 BPM | WEIGHT: 190.1 LBS | TEMPERATURE: 97.9 F | RESPIRATION RATE: 16 BRPM | DIASTOLIC BLOOD PRESSURE: 85 MMHG | BODY MASS INDEX: 28.16 KG/M2 | OXYGEN SATURATION: 98 % | HEIGHT: 69 IN | SYSTOLIC BLOOD PRESSURE: 131 MMHG

## 2023-04-10 DIAGNOSIS — C18.9 MALIGNANT NEOPLASM OF COLON, UNSPECIFIED PART OF COLON (HCC): Primary | ICD-10-CM

## 2023-04-10 LAB
ALBUMIN SERPL-MCNC: 2.9 G/DL (ref 3.5–5)
ALBUMIN/GLOB SERPL: 0.8 (ref 1.1–2.2)
ALP SERPL-CCNC: 152 U/L (ref 45–117)
ALT SERPL-CCNC: 22 U/L (ref 12–78)
ANION GAP SERPL CALC-SCNC: 0 MMOL/L (ref 5–15)
AST SERPL-CCNC: 21 U/L (ref 15–37)
BASOPHILS # BLD: 0 K/UL (ref 0–0.1)
BASOPHILS NFR BLD: 1 % (ref 0–1)
BILIRUB SERPL-MCNC: 0.4 MG/DL (ref 0.2–1)
BUN SERPL-MCNC: 6 MG/DL (ref 6–20)
BUN/CREAT SERPL: 8 (ref 12–20)
CALCIUM SERPL-MCNC: 8.2 MG/DL (ref 8.5–10.1)
CHLORIDE SERPL-SCNC: 108 MMOL/L (ref 97–108)
CO2 SERPL-SCNC: 30 MMOL/L (ref 21–32)
CREAT SERPL-MCNC: 0.74 MG/DL (ref 0.7–1.3)
DIFFERENTIAL METHOD BLD: ABNORMAL
EOSINOPHIL # BLD: 0.1 K/UL (ref 0–0.4)
EOSINOPHIL NFR BLD: 4 % (ref 0–7)
ERYTHROCYTE [DISTWIDTH] IN BLOOD BY AUTOMATED COUNT: 18.5 % (ref 11.5–14.5)
GLOBULIN SER CALC-MCNC: 3.7 G/DL (ref 2–4)
GLUCOSE SERPL-MCNC: 143 MG/DL (ref 65–100)
HCT VFR BLD AUTO: 38.8 % (ref 36.6–50.3)
HGB BLD-MCNC: 13 G/DL (ref 12.1–17)
IMM GRANULOCYTES # BLD AUTO: 0 K/UL (ref 0–0.04)
IMM GRANULOCYTES NFR BLD AUTO: 1 % (ref 0–0.5)
LYMPHOCYTES # BLD: 0.9 K/UL (ref 0.8–3.5)
LYMPHOCYTES NFR BLD: 22 % (ref 12–49)
MCH RBC QN AUTO: 35 PG (ref 26–34)
MCHC RBC AUTO-ENTMCNC: 33.5 G/DL (ref 30–36.5)
MCV RBC AUTO: 104.6 FL (ref 80–99)
MONOCYTES # BLD: 0.4 K/UL (ref 0–1)
MONOCYTES NFR BLD: 11 % (ref 5–13)
NEUTS SEG # BLD: 2.6 K/UL (ref 1.8–8)
NEUTS SEG NFR BLD: 63 % (ref 32–75)
NRBC # BLD: 0 K/UL (ref 0–0.01)
NRBC BLD-RTO: 0 PER 100 WBC
PLATELET # BLD AUTO: 148 K/UL (ref 150–400)
PMV BLD AUTO: 10.1 FL (ref 8.9–12.9)
POTASSIUM SERPL-SCNC: 3.7 MMOL/L (ref 3.5–5.1)
PROT SERPL-MCNC: 6.6 G/DL (ref 6.4–8.2)
RBC # BLD AUTO: 3.71 M/UL (ref 4.1–5.7)
SODIUM SERPL-SCNC: 138 MMOL/L (ref 136–145)
WBC # BLD AUTO: 4.1 K/UL (ref 4.1–11.1)

## 2023-04-10 PROCEDURE — 36415 COLL VENOUS BLD VENIPUNCTURE: CPT

## 2023-04-10 PROCEDURE — 96375 TX/PRO/DX INJ NEW DRUG ADDON: CPT

## 2023-04-10 PROCEDURE — 74011000250 HC RX REV CODE- 250: Performed by: INTERNAL MEDICINE

## 2023-04-10 PROCEDURE — 74011250636 HC RX REV CODE- 250/636: Performed by: INTERNAL MEDICINE

## 2023-04-10 PROCEDURE — 74011000258 HC RX REV CODE- 258: Performed by: INTERNAL MEDICINE

## 2023-04-10 PROCEDURE — 77030012965 HC NDL HUBR BBMI -A

## 2023-04-10 PROCEDURE — 80053 COMPREHEN METABOLIC PANEL: CPT

## 2023-04-10 PROCEDURE — 96415 CHEMO IV INFUSION ADDL HR: CPT

## 2023-04-10 PROCEDURE — 85025 COMPLETE CBC W/AUTO DIFF WBC: CPT

## 2023-04-10 PROCEDURE — 96413 CHEMO IV INFUSION 1 HR: CPT

## 2023-04-10 RX ORDER — HEPARIN 100 UNIT/ML
500 SYRINGE INTRAVENOUS AS NEEDED
Status: ACTIVE | OUTPATIENT
Start: 2023-04-10 | End: 2023-04-10

## 2023-04-10 RX ORDER — SODIUM CHLORIDE 0.9 % (FLUSH) 0.9 %
5-40 SYRINGE (ML) INJECTION AS NEEDED
Status: DISPENSED | OUTPATIENT
Start: 2023-04-10 | End: 2023-04-10

## 2023-04-10 RX ORDER — DEXTROSE MONOHYDRATE 50 MG/ML
5-250 INJECTION, SOLUTION INTRAVENOUS AS NEEDED
Status: DISPENSED | OUTPATIENT
Start: 2023-04-10 | End: 2023-04-10

## 2023-04-10 RX ORDER — SODIUM CHLORIDE 9 MG/ML
5-250 INJECTION, SOLUTION INTRAVENOUS AS NEEDED
Status: DISPENSED | OUTPATIENT
Start: 2023-04-10 | End: 2023-04-10

## 2023-04-10 RX ORDER — SODIUM CHLORIDE 9 MG/ML
5-40 INJECTION INTRAVENOUS AS NEEDED
Status: ACTIVE | OUTPATIENT
Start: 2023-04-10 | End: 2023-04-10

## 2023-04-10 RX ORDER — PALONOSETRON 0.05 MG/ML
0.25 INJECTION, SOLUTION INTRAVENOUS ONCE
Status: COMPLETED | OUTPATIENT
Start: 2023-04-10 | End: 2023-04-10

## 2023-04-10 RX ADMIN — OXALIPLATIN 265 MG: 5 INJECTION, SOLUTION INTRAVENOUS at 11:05

## 2023-04-10 RX ADMIN — DEXTROSE MONOHYDRATE 25 ML/HR: 50 INJECTION, SOLUTION INTRAVENOUS at 10:17

## 2023-04-10 RX ADMIN — PALONOSETRON 0.25 MG: 0.25 INJECTION, SOLUTION INTRAVENOUS at 10:19

## 2023-04-10 RX ADMIN — SODIUM CHLORIDE, PRESERVATIVE FREE 10 ML: 5 INJECTION INTRAVENOUS at 13:15

## 2023-04-10 RX ADMIN — SODIUM CHLORIDE, PRESERVATIVE FREE 10 ML: 5 INJECTION INTRAVENOUS at 09:15

## 2023-04-10 RX ADMIN — HEPARIN 500 UNITS: 100 SYRINGE at 13:15

## 2023-04-10 RX ADMIN — DEXAMETHASONE SODIUM PHOSPHATE 12 MG: 4 INJECTION, SOLUTION INTRAMUSCULAR; INTRAVENOUS at 10:25

## 2023-04-10 NOTE — PROGRESS NOTES
[facility] Visit Note    887- Pt arrived to [facility] ambulatory in no acute distress for C4D1 Capox. Assessment unremarkable except fatigue. R chest port accessed without issue and positive blood return noted. Patient to MD office for appt. Labs obtained - CBC w/ diff, CMP, Signatera testing (kit returned to office)  Recent Results (from the past 12 hour(s))   CBC WITH AUTOMATED DIFF    Collection Time: 04/10/23  9:08 AM   Result Value Ref Range    WBC 4.1 4.1 - 11.1 K/uL    RBC 3.71 (L) 4.10 - 5.70 M/uL    HGB 13.0 12.1 - 17.0 g/dL    HCT 38.8 36.6 - 50.3 %    .6 (H) 80.0 - 99.0 FL    MCH 35.0 (H) 26.0 - 34.0 PG    MCHC 33.5 30.0 - 36.5 g/dL    RDW 18.5 (H) 11.5 - 14.5 %    PLATELET 575 (L) 163 - 400 K/uL    MPV 10.1 8.9 - 12.9 FL    NRBC 0.0 0  WBC    ABSOLUTE NRBC 0.00 0.00 - 0.01 K/uL    NEUTROPHILS 63 32 - 75 %    LYMPHOCYTES 22 12 - 49 %    MONOCYTES 11 5 - 13 %    EOSINOPHILS 4 0 - 7 %    BASOPHILS 1 0 - 1 %    IMMATURE GRANULOCYTES 1 (H) 0.0 - 0.5 %    ABS. NEUTROPHILS 2.6 1.8 - 8.0 K/UL    ABS. LYMPHOCYTES 0.9 0.8 - 3.5 K/UL    ABS. MONOCYTES 0.4 0.0 - 1.0 K/UL    ABS. EOSINOPHILS 0.1 0.0 - 0.4 K/UL    ABS. BASOPHILS 0.0 0.0 - 0.1 K/UL    ABS. IMM. GRANS. 0.0 0.00 - 0.04 K/UL    DF AUTOMATED     METABOLIC PANEL, COMPREHENSIVE    Collection Time: 04/10/23  9:08 AM   Result Value Ref Range    Sodium 138 136 - 145 mmol/L    Potassium 3.7 3.5 - 5.1 mmol/L    Chloride 108 97 - 108 mmol/L    CO2 30 21 - 32 mmol/L    Anion gap 0 (L) 5 - 15 mmol/L    Glucose 143 (H) 65 - 100 mg/dL    BUN 6 6 - 20 MG/DL    Creatinine 0.74 0.70 - 1.30 MG/DL    BUN/Creatinine ratio 8 (L) 12 - 20      eGFR >60 >60 ml/min/1.73m2    Calcium 8.2 (L) 8.5 - 10.1 MG/DL    Bilirubin, total 0.4 0.2 - 1.0 MG/DL    ALT (SGPT) 22 12 - 78 U/L    AST (SGOT) 21 15 - 37 U/L    Alk.  phosphatase 152 (H) 45 - 117 U/L    Protein, total 6.6 6.4 - 8.2 g/dL    Albumin 2.9 (L) 3.5 - 5.0 g/dL    Globulin 3.7 2.0 - 4.0 g/dL    A-G Ratio 0.8 (L) 1.1 - 2.2       Two nurses verified prior to administering:  Drug name  Drug dose  Infusion volume or drug volume when prepared in a syringe  Rate of administration  Route of administration  Expiration dates and/or times  Appearance and physical integrity of the drugs  Rate set on infusion pump, when used  Sequencing of drug administration     The following medications administered:  Medications Administered       dexamethasone (DECADRON) 12 mg in 0.9% sodium chloride 50 mL IVPB       Admin Date  04/10/2023 Action  New Bag Dose  12 mg Route  IntraVENous Administered By  Aman Hughes RN              dextrose 5% infusion       Admin Date  04/10/2023 Action  New Bag Dose  25 mL/hr Rate  25 mL/hr Route  IntraVENous Administered By  Aman Hughes RN              heparin (porcine) pf 500 Units       Admin Date  04/10/2023 Action  Given Dose  500 Units Route  InterCATHeter Administered By  Aman Hughes RN              oxaliplatin (ELOXATIN) 265 mg in dextrose 5% 250 mL, overfill volume 25 mL chemo infusion       Admin Date  04/10/2023 Action  New Bag Dose  265 mg Rate  164 mL/hr Route  IntraVENous Administered By  Aman Hughes RN              palonosetron HCl (ALOXI) injection 0.25 mg       Admin Date  04/10/2023 Action  Given Dose  0.25 mg Route  IntraVENous Administered By  Aman Hughes RN              sodium chloride (NS) flush 5-40 mL       Admin Date  04/10/2023 Action  Given Dose  10 mL Route  IntraVENous Administered By  Aman Hughes RN               Admin Date  04/10/2023 Action  Given Dose  10 mL Route  IntraVENous Administered By  Aman Hughes RN                  Patient Vitals for the past 12 hrs:   Temp Pulse Resp BP SpO2   04/10/23 1315 -- 65 16 131/85 --   04/10/23 0906 97.9 °F (36.6 °C) 71 16 134/85 98 %     1315- Pt tolerated treatment well, no adverse reactions noted. Port flushed per policy and de-accessed, 2x2 and tape placed.   Pt discharged ambulatory in no acute distress, accompanied by spouse. Next appointment 5/1/23.

## 2023-05-01 ENCOUNTER — HOSPITAL ENCOUNTER (OUTPATIENT)
Dept: INFUSION THERAPY | Age: 58
Discharge: HOME | End: 2023-05-01
Payer: COMMERCIAL

## 2023-05-01 ENCOUNTER — OFFICE VISIT (OUTPATIENT)
Dept: ONCOLOGY | Age: 58
End: 2023-05-01
Payer: COMMERCIAL

## 2023-05-01 VITALS
RESPIRATION RATE: 16 BRPM | WEIGHT: 186.73 LBS | TEMPERATURE: 97.9 F | HEART RATE: 72 BPM | BODY MASS INDEX: 27.66 KG/M2 | DIASTOLIC BLOOD PRESSURE: 87 MMHG | OXYGEN SATURATION: 96 % | SYSTOLIC BLOOD PRESSURE: 142 MMHG | HEIGHT: 69 IN

## 2023-05-01 VITALS
HEIGHT: 69 IN | TEMPERATURE: 97.9 F | DIASTOLIC BLOOD PRESSURE: 87 MMHG | SYSTOLIC BLOOD PRESSURE: 145 MMHG | OXYGEN SATURATION: 96 % | WEIGHT: 186.7 LBS | BODY MASS INDEX: 27.65 KG/M2 | HEART RATE: 85 BPM | RESPIRATION RATE: 16 BRPM

## 2023-05-01 DIAGNOSIS — C18.9 MALIGNANT NEOPLASM OF COLON, UNSPECIFIED PART OF COLON (HCC): Primary | ICD-10-CM

## 2023-05-01 DIAGNOSIS — C18.7 CANCER OF SIGMOID COLON (HCC): Primary | ICD-10-CM

## 2023-05-01 DIAGNOSIS — Z51.11 ENCOUNTER FOR ANTINEOPLASTIC CHEMOTHERAPY: ICD-10-CM

## 2023-05-01 LAB
ALBUMIN SERPL-MCNC: 3.3 G/DL (ref 3.5–5)
ALBUMIN/GLOB SERPL: 0.9 (ref 1.1–2.2)
ALP SERPL-CCNC: 131 U/L (ref 45–117)
ALT SERPL-CCNC: 24 U/L (ref 12–78)
ANION GAP SERPL CALC-SCNC: 5 MMOL/L (ref 5–15)
AST SERPL-CCNC: 32 U/L (ref 15–37)
BASOPHILS # BLD: 0 K/UL (ref 0–0.1)
BASOPHILS NFR BLD: 1 % (ref 0–1)
BILIRUB SERPL-MCNC: 1 MG/DL (ref 0.2–1)
BUN SERPL-MCNC: 4 MG/DL (ref 6–20)
BUN/CREAT SERPL: 6 (ref 12–20)
CALCIUM SERPL-MCNC: 8.6 MG/DL (ref 8.5–10.1)
CHLORIDE SERPL-SCNC: 105 MMOL/L (ref 97–108)
CO2 SERPL-SCNC: 26 MMOL/L (ref 21–32)
CREAT SERPL-MCNC: 0.68 MG/DL (ref 0.7–1.3)
DIFFERENTIAL METHOD BLD: ABNORMAL
EOSINOPHIL # BLD: 0.2 K/UL (ref 0–0.4)
EOSINOPHIL NFR BLD: 5 % (ref 0–7)
ERYTHROCYTE [DISTWIDTH] IN BLOOD BY AUTOMATED COUNT: 18.8 % (ref 11.5–14.5)
GLOBULIN SER CALC-MCNC: 3.8 G/DL (ref 2–4)
GLUCOSE SERPL-MCNC: 148 MG/DL (ref 65–100)
HCT VFR BLD AUTO: 36.8 % (ref 36.6–50.3)
HGB BLD-MCNC: 12.9 G/DL (ref 12.1–17)
IMM GRANULOCYTES # BLD AUTO: 0 K/UL (ref 0–0.04)
IMM GRANULOCYTES NFR BLD AUTO: 1 % (ref 0–0.5)
LYMPHOCYTES # BLD: 1 K/UL (ref 0.8–3.5)
LYMPHOCYTES NFR BLD: 25 % (ref 12–49)
MCH RBC QN AUTO: 36.5 PG (ref 26–34)
MCHC RBC AUTO-ENTMCNC: 35.1 G/DL (ref 30–36.5)
MCV RBC AUTO: 104.2 FL (ref 80–99)
MONOCYTES # BLD: 0.8 K/UL (ref 0–1)
MONOCYTES NFR BLD: 20 % (ref 5–13)
NEUTS SEG # BLD: 2 K/UL (ref 1.8–8)
NEUTS SEG NFR BLD: 49 % (ref 32–75)
NRBC # BLD: 0 K/UL (ref 0–0.01)
NRBC BLD-RTO: 0 PER 100 WBC
PLATELET # BLD AUTO: 89 K/UL (ref 150–400)
PMV BLD AUTO: 9.9 FL (ref 8.9–12.9)
POTASSIUM SERPL-SCNC: 3.3 MMOL/L (ref 3.5–5.1)
PROT SERPL-MCNC: 7.1 G/DL (ref 6.4–8.2)
RBC # BLD AUTO: 3.53 M/UL (ref 4.1–5.7)
SODIUM SERPL-SCNC: 136 MMOL/L (ref 136–145)
WBC # BLD AUTO: 4.1 K/UL (ref 4.1–11.1)

## 2023-05-01 PROCEDURE — 99215 OFFICE O/P EST HI 40 MIN: CPT | Performed by: INTERNAL MEDICINE

## 2023-05-01 PROCEDURE — 96375 TX/PRO/DX INJ NEW DRUG ADDON: CPT

## 2023-05-01 PROCEDURE — 74011000250 HC RX REV CODE- 250: Performed by: INTERNAL MEDICINE

## 2023-05-01 PROCEDURE — 96415 CHEMO IV INFUSION ADDL HR: CPT

## 2023-05-01 PROCEDURE — 80053 COMPREHEN METABOLIC PANEL: CPT

## 2023-05-01 PROCEDURE — 85025 COMPLETE CBC W/AUTO DIFF WBC: CPT

## 2023-05-01 PROCEDURE — 96413 CHEMO IV INFUSION 1 HR: CPT

## 2023-05-01 PROCEDURE — 74011000258 HC RX REV CODE- 258: Performed by: INTERNAL MEDICINE

## 2023-05-01 PROCEDURE — 74011250636 HC RX REV CODE- 250/636: Performed by: INTERNAL MEDICINE

## 2023-05-01 PROCEDURE — 36415 COLL VENOUS BLD VENIPUNCTURE: CPT

## 2023-05-01 RX ORDER — SODIUM CHLORIDE 0.9 % (FLUSH) 0.9 %
5-40 SYRINGE (ML) INJECTION AS NEEDED
Status: DISPENSED | OUTPATIENT
Start: 2023-05-01 | End: 2023-05-01

## 2023-05-01 RX ORDER — PALONOSETRON 0.05 MG/ML
0.25 INJECTION, SOLUTION INTRAVENOUS ONCE
Status: COMPLETED | OUTPATIENT
Start: 2023-05-01 | End: 2023-05-01

## 2023-05-01 RX ORDER — DEXTROSE MONOHYDRATE 50 MG/ML
5-250 INJECTION, SOLUTION INTRAVENOUS AS NEEDED
Status: DISPENSED | OUTPATIENT
Start: 2023-05-01 | End: 2023-05-01

## 2023-05-01 RX ORDER — HEPARIN 100 UNIT/ML
500 SYRINGE INTRAVENOUS AS NEEDED
Status: ACTIVE | OUTPATIENT
Start: 2023-05-01 | End: 2023-05-01

## 2023-05-01 RX ADMIN — DEXTROSE MONOHYDRATE 25 ML/HR: 50 INJECTION, SOLUTION INTRAVENOUS at 10:40

## 2023-05-01 RX ADMIN — PALONOSETRON 0.25 MG: 0.05 INJECTION, SOLUTION INTRAVENOUS at 11:12

## 2023-05-01 RX ADMIN — OXALIPLATIN 265 MG: 5 INJECTION, SOLUTION INTRAVENOUS at 11:33

## 2023-05-01 RX ADMIN — SODIUM CHLORIDE, PRESERVATIVE FREE 10 ML: 5 INJECTION INTRAVENOUS at 13:41

## 2023-05-01 RX ADMIN — DEXAMETHASONE SODIUM PHOSPHATE 12 MG: 4 INJECTION, SOLUTION INTRAMUSCULAR; INTRAVENOUS at 10:44

## 2023-05-01 RX ADMIN — Medication 500 UNITS: at 13:42

## 2023-05-01 NOTE — PROGRESS NOTES
[facility] Progress Note    Date: May 1, 2023    Name: Ed Arechiga    MRN: [id number]         : 1965    Mr. Mabel Seymour Arrived ambulatory and in no distress for C5 of Capox Regimen. Assessment was completed. Pt reports feet are dry and peeling. Reports using moisturizer nightly. Right chest wall port accessed without difficulty, labs drawn & sent for processing. Chemotherapy Flowsheet 2023   Cycle C5   Date 2023   Drug / Regimen Capox   Pre Meds given   Notes given       Patient proceed to appointment with Dr. Fidelina Khan. Mr. Lola Buitrago vitals were reviewed. Patient Vitals for the past 12 hrs:   Temp Pulse Resp BP SpO2   23 1337 -- 85 16 (!) 145/87 --   23 0854 97.9 °F (36.6 °C) 72 16 (!) 142/87 96 %     Lab results were obtained and reviewed. Recent Results (from the past 12 hour(s))   CBC WITH AUTOMATED DIFF    Collection Time: 23  8:58 AM   Result Value Ref Range    WBC 4.1 4.1 - 11.1 K/uL    RBC 3.53 (L) 4.10 - 5.70 M/uL    HGB 12.9 12.1 - 17.0 g/dL    HCT 36.8 36.6 - 50.3 %    .2 (H) 80.0 - 99.0 FL    MCH 36.5 (H) 26.0 - 34.0 PG    MCHC 35.1 30.0 - 36.5 g/dL    RDW 18.8 (H) 11.5 - 14.5 %    PLATELET 89 (L) 929 - 400 K/uL    MPV 9.9 8.9 - 12.9 FL    NRBC 0.0 0  WBC    ABSOLUTE NRBC 0.00 0.00 - 0.01 K/uL    NEUTROPHILS 49 32 - 75 %    LYMPHOCYTES 25 12 - 49 %    MONOCYTES 20 (H) 5 - 13 %    EOSINOPHILS 5 0 - 7 %    BASOPHILS 1 0 - 1 %    IMMATURE GRANULOCYTES 1 (H) 0.0 - 0.5 %    ABS. NEUTROPHILS 2.0 1.8 - 8.0 K/UL    ABS. LYMPHOCYTES 1.0 0.8 - 3.5 K/UL    ABS. MONOCYTES 0.8 0.0 - 1.0 K/UL    ABS. EOSINOPHILS 0.2 0.0 - 0.4 K/UL    ABS. BASOPHILS 0.0 0.0 - 0.1 K/UL    ABS. IMM.  GRANS. 0.0 0.00 - 0.04 K/UL    DF AUTOMATED     METABOLIC PANEL, COMPREHENSIVE    Collection Time: 23  8:58 AM   Result Value Ref Range    Sodium 136 136 - 145 mmol/L    Potassium 3.3 (L) 3.5 - 5.1 mmol/L    Chloride 105 97 - 108 mmol/L    CO2 26 21 - 32 mmol/L    Anion gap 5 5 - 15 mmol/L Glucose 148 (H) 65 - 100 mg/dL    BUN 4 (L) 6 - 20 MG/DL    Creatinine 0.68 (L) 0.70 - 1.30 MG/DL    BUN/Creatinine ratio 6 (L) 12 - 20      eGFR >60 >60 ml/min/1.73m2    Calcium 8.6 8.5 - 10.1 MG/DL    Bilirubin, total 1.0 0.2 - 1.0 MG/DL    ALT (SGPT) 24 12 - 78 U/L    AST (SGOT) 32 15 - 37 U/L    Alk.  phosphatase 131 (H) 45 - 117 U/L    Protein, total 7.1 6.4 - 8.2 g/dL    Albumin 3.3 (L) 3.5 - 5.0 g/dL    Globulin 3.8 2.0 - 4.0 g/dL    A-G Ratio 0.9 (L) 1.1 - 2.2     Medications:  Medications Administered       dexamethasone (DECADRON) 12 mg in 0.9% sodium chloride 50 mL IVPB       Admin Date  05/01/2023 Action  New Bag Dose  12 mg Route  IntraVENous Administered By  Watson Cason RN              dextrose 5% infusion       Admin Date  05/01/2023 Action  New Bag Dose  25 mL/hr Rate  25 mL/hr Route  IntraVENous Administered By  Watson Cason RN              heparin (porcine) pf 500 Units       Admin Date  05/01/2023 Action  Given Dose  500 Units Route  InterCATHeter Administered By  Watson Cason RN              oxaliplatin (ELOXATIN) 265 mg in dextrose 5% 250 mL, overfill volume 25 mL chemo infusion       Admin Date  05/01/2023 Action  New Bag Dose  265 mg Rate  164 mL/hr Route  IntraVENous Administered By  Watson Cason RN              palonosetron HCl (ALOXI) injection 0.25 mg       Admin Date  05/01/2023 Action  Given Dose  0.25 mg Route  IntraVENous Administered By  Watson Cason RN              sodium chloride (NS) flush 5-40 mL       Admin Date  05/01/2023 Action  Given Dose  10 mL Route  IntraVENous Administered By  Watson Cason RN                  Two nurses verified prior to administering: Drug name, drug dose, infusion volume or drug volume when prepared in a syringe, rate of administration, route of administration,  expiration dates and/or times, appearance and physical integrity of the drugs, rate set on infusion pump, when used sequencing of drug administration. Mr. Titi Chatman tolerated treatment well and was discharged from [place] [id number] in stable condition at 1340. Port de-accessed, flushed & heparinized per protocol. He is to return on 05/22/2023 for his next appointment.     Cassia Dorsey RN  May 1, 2023

## 2023-05-01 NOTE — PROGRESS NOTES
[address] [facility] [address], 210 [facility], [address] [place] [address]  [phone]    Oncology Note      Patient: Christa Hill MRN: [id number]  SSN: [id number]    YOB: 1965  Age: 65 y.o. Sex: male        Diagnosis:     1. Colon adenocarcinoma, sigmoid and moderately differentiated:   T4 N0 M0 (Stage IIC)       Treatment:      1. Adjuvant chemotherapy   CapOx - cycle 4 day 1              (Dose reduce to 1500 mg bid cycle 3)    Subjective:      Christa Hill is a 65 y.o. male who I am seeing for a new diagnosis of colon carcinoma. He was noted to have diverticulitis which did not respond to antibiotics treatment. He underwent a left hemicolectomy. The pathology reveals colon adenocarcinoma. 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 Interval History:     He is receiving adjuvant chemotherapy. He is doing better with reduced dose of Corey. Still skin peeling from the feet. Bowels have improved. Cold sensitivity from Ox. Review of Systems:    Constitutional: negative  Eyes: negative  Ears, Nose, Mouth, Throat, and Face: negative  Respiratory: negative  Cardiovascular: negative  Gastrointestinal: negative  Genitourinary:negative  Integument/Breast: negative  Hematologic/Lymphatic: negative  Musculoskeletal:negative  Neurological: negative    Review of systems was reviewed and updated as needed on 05/01/23.       Past Medical History:   Diagnosis Date    Cancer ([facility] [address] 2023    colon    Hypercholesterolemia     Psoriasis      Past Surgical History:   Procedure Laterality Date    HX OTHER SURGICAL      Laparoscopic inguinal hernia repair    IR INSERT TUNL CVC W PORT OVER 5 YEARS  3/14/2023      Family History   Problem Relation Age of Onset    Crohn's Disease Sister     Cancer Paternal Grandfather         colon     Social History     Tobacco Use    Smoking status: Every Day     Packs/day: 0.50     Types: Cigarettes    Smokeless tobacco: Never   Substance Use Topics    Alcohol use: Yes      Prior to Admission medications    Medication Sig Start Date End Date Taking? Authorizing Provider   capecitabine (Xeloda) 500 mg tablet 3 Tablets two (2) times a day. Take 1500mg by mouth twice a day for 2 weeks on and 1 week off. 4/10/23  Yes [place] MD Gia   lidocaine-prilocaine (EMLA) topical cream Apply  to affected area as needed for Pain. Apply small amount over port 30 minutes prior to chemotherapy 3/20/23  Yes Stacy Frederick, MIRIAM   apremilast [place]) 30 mg tab Take 1 mg by mouth once over twenty-four (24) hours. Yes Provider, Historical   ondansetron (ZOFRAN ODT) 4 mg disintegrating tablet Take 1 Tablet by mouth every eight (8) hours as needed for Nausea or Vomiting. 2/6/23  Yes Lane Nielsen MD   prochlorperazine (COMPAZINE) 10 mg tablet Take 0.5 Tablets by mouth every six (6) hours as needed for Nausea. 2/6/23  Yes [place] MD Gia          No Known Allergies        Objective:     Visit Vitals  BP (!) 142/87   Pulse 72   Temp 97.9 °F (36.6 °C)   Resp 16   Ht 5' 9\" (1.753 m)   Wt 186 lb 11.7 oz (84.7 kg)   SpO2 96%   BMI 27.58 kg/m²       Pain Scale: 0 - No pain/10      Physical Exam:    GENERAL: alert, cooperative, no distress, appears stated age  EYE: conjunctivae/corneas clear. LYMPHATIC: Cervical, supraclavicular, and axillary nodes normal.   THROAT & NECK: normal and no erythema or exudates noted. LUNG: clear to auscultation bilaterally  HEART: regular rate and rhythm  ABDOMEN: soft, non-tender. Colostomy in place. EXTREMITIES:  extremities normal, atraumatic, no cyanosis or edema  SKIN: no rash or abnormalities  NEUROLOGIC: AOx3. Gait normal.     The above physical exam was reviewed and updated as needed on 05/01/23.       Lab Results   Component Value Date/Time    WBC 4.1 05/01/2023 08:58 AM    HGB 12.9 05/01/2023 08:58 AM    HCT 36.8 05/01/2023 08:58 AM    PLATELET 89 (L) 11/66/9111 08:58 AM .2 (H) 05/01/2023 08:58 AM         Lab Results   Component Value Date/Time    Sodium 136 05/01/2023 08:58 AM    Potassium 3.3 (L) 05/01/2023 08:58 AM    Chloride 105 05/01/2023 08:58 AM    CO2 26 05/01/2023 08:58 AM    Anion gap 5 05/01/2023 08:58 AM    Glucose 148 (H) 05/01/2023 08:58 AM    BUN 4 (L) 05/01/2023 08:58 AM    Creatinine 0.68 (L) 05/01/2023 08:58 AM    BUN/Creatinine ratio 6 (L) 05/01/2023 08:58 AM    Calcium 8.6 05/01/2023 08:58 AM    Bilirubin, total 1.0 05/01/2023 08:58 AM    Alk. phosphatase 131 (H) 05/01/2023 08:58 AM    Protein, total 7.1 05/01/2023 08:58 AM    Albumin 3.3 (L) 05/01/2023 08:58 AM    Globulin 3.8 05/01/2023 08:58 AM    A-G Ratio 0.9 (L) 05/01/2023 08:58 AM    ALT (SGPT) 24 05/01/2023 08:58 AM    AST (SGOT) 32 05/01/2023 08:58 AM       Assessment:     1. Colon adenocarcinoma, sigmoid and moderately differentiated:   T4 N0 M0 (Stage IIC)        ECOG PS 0  Intent of treatment - curative  Prognosis: Good    S/P left hemicolectomy 12/21/2022  Perforated colon cancer    Signatera 2/16/2023 - 0.41 Positive     Due to perforated colon cancer and T4 disease, the risk of recurrence is significant. I recommend adjuvant chemotherapy. Due to work constraints, I recommend adjuvant chemotherapy with CapOx for at least 3 months. Adjuvant chemotherapy   CapOx - cycle 5 day 1              (Dose reduce to 1500 mg bid)    Tolerating treatment well  Blood counts are acceptable. Results reviewed with the patient. Symptom management form reviewed with patient. 2. Colitis improved        Plan:     Continue CAPOX   Labs prior to each cycle: CBC, BMP  Hepatic function panel every 6 weeks  Prophylactic antiemetics: Palonosetron and Dexamethasone prior to treatment and dexamethasone at home for 2 days after each treatment.    PRN Antiemetics: Ondansetron and Prochlorperazine PRN at home  Follow-up in 3 weeks  Signatera every 3 months      Signed By: Aviva Doshi NP     May 1, 2023 Attestation of attending physician. I personally saw and evaluated the patient and performed the key components of medical decision making with Sadia Ashley NP. I reviewed and verified the above documentation and modified it as needed. Mr. Jorge Plunkett is a man with a diagnosis of Stage II CRC. He is receiving adjuvant chemotherapy. Vitals is normal  Exam reveals a pleasant man  Chest is clear, heart sounds regular  Abdomen is soft  Alert and oriented     Due to continued benefit, I recommended he will continue systemic therapy. I obtained history, performed physical exam, reviewed labs and imaging and communicated the treatment plan to the patient. I may drop his Ox after this dose. Signed by: Griselda Farber, MD                     May 1, 2023        CC.  Justin Lema MD

## 2023-05-10 DIAGNOSIS — C18.9 MALIGNANT NEOPLASM OF COLON, UNSPECIFIED PART OF COLON (HCC): Primary | ICD-10-CM

## 2023-05-10 RX ORDER — DEXTROSE MONOHYDRATE 50 MG/ML
5-250 INJECTION, SOLUTION INTRAVENOUS PRN
Status: CANCELLED | OUTPATIENT
Start: 2023-06-12

## 2023-05-10 RX ORDER — SODIUM CHLORIDE 9 MG/ML
5-250 INJECTION, SOLUTION INTRAVENOUS PRN
Status: CANCELLED | OUTPATIENT
Start: 2023-06-12

## 2023-05-10 RX ORDER — MEPERIDINE HYDROCHLORIDE 25 MG/ML
12.5 INJECTION INTRAMUSCULAR; INTRAVENOUS; SUBCUTANEOUS PRN
Status: CANCELLED | OUTPATIENT
Start: 2023-06-12

## 2023-05-10 RX ORDER — HEPARIN SODIUM (PORCINE) LOCK FLUSH IV SOLN 100 UNIT/ML 100 UNIT/ML
500 SOLUTION INTRAVENOUS PRN
Status: CANCELLED | OUTPATIENT
Start: 2023-06-12

## 2023-05-10 RX ORDER — ACETAMINOPHEN 325 MG/1
650 TABLET ORAL
Status: CANCELLED | OUTPATIENT
Start: 2023-06-12

## 2023-05-10 RX ORDER — DIPHENHYDRAMINE HYDROCHLORIDE 50 MG/ML
50 INJECTION INTRAMUSCULAR; INTRAVENOUS
Status: CANCELLED | OUTPATIENT
Start: 2023-06-12

## 2023-05-10 RX ORDER — SODIUM CHLORIDE 9 MG/ML
INJECTION, SOLUTION INTRAVENOUS CONTINUOUS
Status: CANCELLED | OUTPATIENT
Start: 2023-06-12

## 2023-05-10 RX ORDER — ONDANSETRON 2 MG/ML
8 INJECTION INTRAMUSCULAR; INTRAVENOUS
Status: CANCELLED | OUTPATIENT
Start: 2023-06-12

## 2023-05-10 RX ORDER — ALBUTEROL SULFATE 90 UG/1
4 AEROSOL, METERED RESPIRATORY (INHALATION) PRN
Status: CANCELLED | OUTPATIENT
Start: 2023-06-12

## 2023-05-10 RX ORDER — EPINEPHRINE 1 MG/ML
0.3 INJECTION, SOLUTION, CONCENTRATE INTRAVENOUS PRN
Status: CANCELLED | OUTPATIENT
Start: 2023-06-12

## 2023-05-10 RX ORDER — PALONOSETRON 0.05 MG/ML
0.25 INJECTION, SOLUTION INTRAVENOUS ONCE
Status: CANCELLED | OUTPATIENT
Start: 2023-06-12 | End: 2023-05-22

## 2023-05-10 RX ORDER — SODIUM CHLORIDE 0.9 % (FLUSH) 0.9 %
5-40 SYRINGE (ML) INJECTION PRN
Status: CANCELLED | OUTPATIENT
Start: 2023-06-12

## 2023-05-15 DIAGNOSIS — C18.9 MALIGNANT NEOPLASM OF COLON, UNSPECIFIED PART OF COLON (HCC): Primary | ICD-10-CM

## 2023-05-15 RX ORDER — DEXTROSE MONOHYDRATE 50 MG/ML
5-250 INJECTION, SOLUTION INTRAVENOUS PRN
Status: CANCELLED | OUTPATIENT
Start: 2023-05-22

## 2023-05-15 RX ORDER — ALBUTEROL SULFATE 90 UG/1
4 AEROSOL, METERED RESPIRATORY (INHALATION) PRN
Status: CANCELLED | OUTPATIENT
Start: 2023-05-22

## 2023-05-15 RX ORDER — SODIUM CHLORIDE 9 MG/ML
5-250 INJECTION, SOLUTION INTRAVENOUS PRN
Status: CANCELLED | OUTPATIENT
Start: 2023-05-22

## 2023-05-15 RX ORDER — DIPHENHYDRAMINE HYDROCHLORIDE 50 MG/ML
50 INJECTION INTRAMUSCULAR; INTRAVENOUS
Status: CANCELLED | OUTPATIENT
Start: 2023-05-22

## 2023-05-15 RX ORDER — SODIUM CHLORIDE 0.9 % (FLUSH) 0.9 %
5-40 SYRINGE (ML) INJECTION PRN
Status: CANCELLED | OUTPATIENT
Start: 2023-05-22

## 2023-05-15 RX ORDER — ONDANSETRON 2 MG/ML
8 INJECTION INTRAMUSCULAR; INTRAVENOUS
Status: CANCELLED | OUTPATIENT
Start: 2023-05-22

## 2023-05-15 RX ORDER — EPINEPHRINE 1 MG/ML
0.3 INJECTION, SOLUTION, CONCENTRATE INTRAVENOUS PRN
Status: CANCELLED | OUTPATIENT
Start: 2023-05-22

## 2023-05-15 RX ORDER — PALONOSETRON 0.05 MG/ML
0.25 INJECTION, SOLUTION INTRAVENOUS ONCE
Status: CANCELLED | OUTPATIENT
Start: 2023-05-22 | End: 2023-05-22

## 2023-05-15 RX ORDER — ACETAMINOPHEN 325 MG/1
650 TABLET ORAL
Status: CANCELLED | OUTPATIENT
Start: 2023-05-22

## 2023-05-15 RX ORDER — SODIUM CHLORIDE 9 MG/ML
INJECTION, SOLUTION INTRAVENOUS CONTINUOUS
Status: CANCELLED | OUTPATIENT
Start: 2023-05-22

## 2023-05-15 RX ORDER — MEPERIDINE HYDROCHLORIDE 25 MG/ML
12.5 INJECTION INTRAMUSCULAR; INTRAVENOUS; SUBCUTANEOUS PRN
Status: CANCELLED | OUTPATIENT
Start: 2023-05-22

## 2023-05-15 RX ORDER — HEPARIN SODIUM (PORCINE) LOCK FLUSH IV SOLN 100 UNIT/ML 100 UNIT/ML
500 SOLUTION INTRAVENOUS PRN
Status: CANCELLED | OUTPATIENT
Start: 2023-05-22

## 2023-05-22 ENCOUNTER — OFFICE VISIT (OUTPATIENT)
Age: 58
End: 2023-05-22
Payer: COMMERCIAL

## 2023-05-22 ENCOUNTER — APPOINTMENT (OUTPATIENT)
Dept: INFUSION THERAPY | Age: 58
End: 2023-05-22

## 2023-05-22 ENCOUNTER — HOSPITAL ENCOUNTER (OUTPATIENT)
Facility: HOSPITAL | Age: 58
Setting detail: INFUSION SERIES
End: 2023-05-22
Payer: COMMERCIAL

## 2023-05-22 VITALS
BODY MASS INDEX: 27.85 KG/M2 | SYSTOLIC BLOOD PRESSURE: 133 MMHG | HEIGHT: 69 IN | WEIGHT: 188.05 LBS | DIASTOLIC BLOOD PRESSURE: 86 MMHG | RESPIRATION RATE: 18 BRPM | TEMPERATURE: 98 F | OXYGEN SATURATION: 97 % | HEART RATE: 71 BPM

## 2023-05-22 VITALS
TEMPERATURE: 98 F | DIASTOLIC BLOOD PRESSURE: 86 MMHG | RESPIRATION RATE: 18 BRPM | WEIGHT: 188.1 LBS | OXYGEN SATURATION: 97 % | HEART RATE: 71 BPM | SYSTOLIC BLOOD PRESSURE: 133 MMHG | BODY MASS INDEX: 27.86 KG/M2 | HEIGHT: 69 IN

## 2023-05-22 DIAGNOSIS — Z51.11 ENCOUNTER FOR CHEMOTHERAPY MANAGEMENT: ICD-10-CM

## 2023-05-22 DIAGNOSIS — G62.0 PERIPHERAL NEUROPATHY DUE TO CHEMOTHERAPY (HCC): ICD-10-CM

## 2023-05-22 DIAGNOSIS — C18.7 MALIGNANT NEOPLASM OF SIGMOID COLON (HCC): Primary | ICD-10-CM

## 2023-05-22 DIAGNOSIS — T45.1X5A PERIPHERAL NEUROPATHY DUE TO CHEMOTHERAPY (HCC): ICD-10-CM

## 2023-05-22 DIAGNOSIS — C18.9 MALIGNANT NEOPLASM OF COLON, UNSPECIFIED PART OF COLON (HCC): Primary | ICD-10-CM

## 2023-05-22 LAB
ALBUMIN SERPL-MCNC: 3.2 G/DL (ref 3.5–5)
ALBUMIN/GLOB SERPL: 0.9 (ref 1.1–2.2)
ALP SERPL-CCNC: 157 U/L (ref 45–117)
ALT SERPL-CCNC: 26 U/L (ref 12–78)
ANION GAP SERPL CALC-SCNC: 2 MMOL/L (ref 5–15)
AST SERPL-CCNC: 32 U/L (ref 15–37)
BASOPHILS # BLD: 0 K/UL (ref 0–0.1)
BASOPHILS NFR BLD: 0 % (ref 0–1)
BILIRUB SERPL-MCNC: 0.8 MG/DL (ref 0.2–1)
BUN SERPL-MCNC: 5 MG/DL (ref 6–20)
BUN/CREAT SERPL: 8 (ref 12–20)
CALCIUM SERPL-MCNC: 8 MG/DL (ref 8.5–10.1)
CHLORIDE SERPL-SCNC: 112 MMOL/L (ref 97–108)
CO2 SERPL-SCNC: 25 MMOL/L (ref 21–32)
CREAT SERPL-MCNC: 0.59 MG/DL (ref 0.7–1.3)
DIFFERENTIAL METHOD BLD: ABNORMAL
EOSINOPHIL # BLD: 0.1 K/UL (ref 0–0.4)
EOSINOPHIL NFR BLD: 3 % (ref 0–7)
ERYTHROCYTE [DISTWIDTH] IN BLOOD BY AUTOMATED COUNT: 19.9 % (ref 11.5–14.5)
GLOBULIN SER CALC-MCNC: 3.6 G/DL (ref 2–4)
GLUCOSE SERPL-MCNC: 103 MG/DL (ref 65–100)
HCT VFR BLD AUTO: 31.4 % (ref 36.6–50.3)
HGB BLD-MCNC: 10.6 G/DL (ref 12.1–17)
IMM GRANULOCYTES # BLD AUTO: 0 K/UL (ref 0–0.04)
IMM GRANULOCYTES NFR BLD AUTO: 0 % (ref 0–0.5)
LYMPHOCYTES # BLD: 0.9 K/UL (ref 0.8–3.5)
LYMPHOCYTES NFR BLD: 27 % (ref 12–49)
MCH RBC QN AUTO: 37.5 PG (ref 26–34)
MCHC RBC AUTO-ENTMCNC: 33.8 G/DL (ref 30–36.5)
MCV RBC AUTO: 111 FL (ref 80–99)
MONOCYTES # BLD: 0.4 K/UL (ref 0–1)
MONOCYTES NFR BLD: 14 % (ref 5–13)
NEUTS SEG # BLD: 1.8 K/UL (ref 1.8–8)
NEUTS SEG NFR BLD: 56 % (ref 32–75)
NRBC # BLD: 0 K/UL (ref 0–0.01)
NRBC BLD-RTO: 0 PER 100 WBC
PLATELET # BLD AUTO: 50 K/UL (ref 150–400)
PMV BLD AUTO: 11.4 FL (ref 8.9–12.9)
POTASSIUM SERPL-SCNC: 3.6 MMOL/L (ref 3.5–5.1)
PROT SERPL-MCNC: 6.8 G/DL (ref 6.4–8.2)
RBC # BLD AUTO: 2.83 M/UL (ref 4.1–5.7)
RBC MORPH BLD: ABNORMAL
RBC MORPH BLD: ABNORMAL
SODIUM SERPL-SCNC: 139 MMOL/L (ref 136–145)
WBC # BLD AUTO: 3.2 K/UL (ref 4.1–11.1)

## 2023-05-22 PROCEDURE — 36415 COLL VENOUS BLD VENIPUNCTURE: CPT

## 2023-05-22 PROCEDURE — 99215 OFFICE O/P EST HI 40 MIN: CPT | Performed by: INTERNAL MEDICINE

## 2023-05-22 PROCEDURE — 80053 COMPREHEN METABOLIC PANEL: CPT

## 2023-05-22 PROCEDURE — 85025 COMPLETE CBC W/AUTO DIFF WBC: CPT

## 2023-05-22 RX ORDER — CAPECITABINE 500 MG/1
TABLET, FILM COATED ORAL 2 TIMES DAILY
COMMUNITY
Start: 2023-04-10 | End: 2023-08-10

## 2023-05-22 RX ORDER — HEPARIN SODIUM (PORCINE) LOCK FLUSH IV SOLN 100 UNIT/ML 100 UNIT/ML
500 SOLUTION INTRAVENOUS PRN
Status: DISCONTINUED | OUTPATIENT
Start: 2023-05-22 | End: 2023-05-23 | Stop reason: HOSPADM

## 2023-05-22 RX ORDER — SODIUM CHLORIDE 0.9 % (FLUSH) 0.9 %
5-40 SYRINGE (ML) INJECTION PRN
Status: DISCONTINUED | OUTPATIENT
Start: 2023-05-22 | End: 2023-05-23 | Stop reason: HOSPADM

## 2023-05-22 NOTE — PROGRESS NOTES
2001 [facility]   at [facility], 210 [facility], [address] [place] [address]   [phone]      Oncology Note            Patient: Ishaan Sellers  MRN: [id number]   SSN: [id number]          YOB: 1965                Diagnosis:        1. Colon adenocarcinoma, sigmoid and moderately differentiated: Dx: 12/21/22    T4 N0 M0 (Stage IIC)           Treatment:         1. Adjuvant chemotherapy    CapOx - 4 cycles   Cape cycle 1               (Dose reduce to 1500 mg bid cycle 3)        Subjective:         Ishaan Sellers is a 65 y.o. male who I am seeing for a new diagnosis of colon carcinoma. He was noted to have diverticulitis which did not respond to antibiotics treatment. He underwent a left hemicolectomy. The pathology reveals colon adenocarcinoma. 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 Interval History:      He is receiving adjuvant chemotherapy. He is doing better with reduced dose of Sharlene. Cold sensitivity from Ox is persisting. Review of Systems:      Constitutional: negative   Eyes: negative   Ears, Nose, Mouth, Throat, and Face: negative   Respiratory: negative   Cardiovascular: negative   Gastrointestinal: negative   Genitourinary:negative   Integument/Breast: negative   Hematologic/Lymphatic: negative   Musculoskeletal:negative   Neurological: negative       Review of systems was reviewed and updated as needed on 05/22/23.         Past Medical History:   Diagnosis Date    Cancer ([address] 2023    colon    Hypercholesterolemia     Psoriasis        Past Surgical History:   Procedure Laterality Date    IR PORT PLACEMENT EQUAL OR GREATER THAN 5 YEARS  3/14/2023    IR PORT PLACEMENT EQUAL OR GREATER THAN 5 YEARS 3/14/2023 [facility] RAD ANGIO IR    IR PORT PLACEMENT EQUAL OR GREATER THAN 5 YEARS  3/14/2023    OTHER SURGICAL HISTORY      Laparoscopic inguinal hernia repair       Family History   Problem Relation

## 2023-05-22 NOTE — PROGRESS NOTES
[facility] Visit Note  Skip Holding  1965  [id number]    Pt arrived to [facility] ambulatory in no acute distress at 0915 for treatment. Assessment unremarkable. port accessed without issue and positive blood return noted. Labs obtained, as ordered. Patient sent to MD office for follow-up visit. Treatment to be held per md office. Patient denied having any symptoms of COVID-19, i.e. SOB, coughing, fever, or generally not feeling well.       Patient Vitals for the past 24 hrs:   BP Temp Pulse Resp SpO2 Height Weight   05/22/23 0915 133/86 98 °F (36.7 °C) 71 18 97 % 1.753 m (5' 9\") 85.3 kg (188 lb 1.6 oz)         Recent Results (from the past 12 hour(s))   CBC With Auto Differential    Collection Time: 05/22/23  9:23 AM   Result Value Ref Range    WBC 3.2 (L) 4.1 - 11.1 K/uL    RBC 2.83 (L) 4.10 - 5.70 M/uL    Hemoglobin 10.6 (L) 12.1 - 17.0 g/dL    Hematocrit 31.4 (L) 36.6 - 50.3 %    .0 (H) 80.0 - 99.0 FL    MCH 37.5 (H) 26.0 - 34.0 PG    MCHC 33.8 30.0 - 36.5 g/dL    RDW 19.9 (H) 11.5 - 14.5 %    Platelets 50 (L) 558 - 400 K/uL    MPV 11.4 8.9 - 12.9 FL    Nucleated RBCs 0.0 0  WBC    nRBC 0.00 0.00 - 0.01 K/uL    Neutrophils % 56 32 - 75 %    Lymphocytes % 27 12 - 49 %    Monocytes % 14 (H) 5 - 13 %    Eosinophils % 3 0 - 7 %    Basophils % 0 0 - 1 %    Immature Granulocytes 0 0.0 - 0.5 %    Neutrophils Absolute 1.8 1.8 - 8.0 K/UL    Lymphocytes Absolute 0.9 0.8 - 3.5 K/UL    Monocytes Absolute 0.4 0.0 - 1.0 K/UL    Eosinophils Absolute 0.1 0.0 - 0.4 K/UL    Basophils Absolute 0.0 0.0 - 0.1 K/UL    Absolute Immature Granulocyte 0.0 0.00 - 0.04 K/UL    Differential Type SMEAR SCANNED      RBC Comment MACROCYTOSIS  2+        RBC Comment ANISOCYTOSIS  2+       Comprehensive metabolic panel    Collection Time: 05/22/23  9:23 AM   Result Value Ref Range    Sodium 139 136 - 145 mmol/L    Potassium 3.6 3.5 - 5.1 mmol/L    Chloride 112 (H) 97 - 108 mmol/L    CO2 25 21 - 32

## 2023-05-23 ENCOUNTER — TELEPHONE (OUTPATIENT)
Age: 58
End: 2023-05-23

## 2023-05-24 RX ORDER — ONDANSETRON 4 MG/1
TABLET, ORALLY DISINTEGRATING ORAL EVERY 8 HOURS PRN
COMMUNITY
Start: 2023-02-06

## 2023-05-24 RX ORDER — APREMILAST 30 MG/1
TABLET, FILM COATED ORAL
COMMUNITY

## 2023-05-24 RX ORDER — LIDOCAINE AND PRILOCAINE 25; 25 MG/G; MG/G
CREAM TOPICAL PRN
COMMUNITY
Start: 2023-03-20

## 2023-05-24 RX ORDER — PROCHLORPERAZINE MALEATE 10 MG
TABLET ORAL EVERY 6 HOURS PRN
COMMUNITY
Start: 2023-02-06

## 2023-06-16 ENCOUNTER — TELEPHONE (OUTPATIENT)
Age: 58
End: 2023-06-16

## 2023-06-19 NOTE — TELEPHONE ENCOUNTER
Called pt. He is aware not due un til 7/10 and signatera will come to the home. HIPAA verified by two patient identifiers.

## 2023-07-03 ENCOUNTER — APPOINTMENT (OUTPATIENT)
Dept: INFUSION THERAPY | Age: 58
End: 2023-07-03

## 2023-07-05 ENCOUNTER — OFFICE VISIT (OUTPATIENT)
Age: 58
End: 2023-07-05
Payer: COMMERCIAL

## 2023-07-05 VITALS
HEART RATE: 72 BPM | OXYGEN SATURATION: 95 % | DIASTOLIC BLOOD PRESSURE: 73 MMHG | SYSTOLIC BLOOD PRESSURE: 127 MMHG | TEMPERATURE: 98.2 F | WEIGHT: 182.8 LBS | BODY MASS INDEX: 27.08 KG/M2 | HEIGHT: 69 IN

## 2023-07-05 DIAGNOSIS — C18.7 MALIGNANT NEOPLASM OF SIGMOID COLON (HCC): Primary | ICD-10-CM

## 2023-07-05 PROCEDURE — 99214 OFFICE O/P EST MOD 30 MIN: CPT | Performed by: INTERNAL MEDICINE

## 2023-07-05 NOTE — PROGRESS NOTES
SHELDONB FROM DR Mandy George CT ABD PELV W IV CONTRAST AND IR REMOVE PORT
05/22/2023    K 3.6 05/22/2023     (H) 05/22/2023    CO2 25 05/22/2023    BUN 5 (L) 05/22/2023    CREATININE 0.59 (L) 05/22/2023    GLUCOSE 103 (H) 05/22/2023    CALCIUM 8.0 (L) 05/22/2023    PROT 6.8 05/22/2023    LABALBU 3.2 (L) 05/22/2023    BILITOT 0.8 05/22/2023    ALKPHOS 157 (H) 05/22/2023    AST 32 05/22/2023    ALT 26 05/22/2023    LABGLOM >60 05/22/2023    AGRATIO 0.9 (L) 05/01/2023    GLOB 3.6 05/22/2023            Assessment:        1. Colon adenocarcinoma, sigmoid and moderately differentiated: Dx: 12/21/2022    T4 N0 M0 (Stage IIC)          ECOG PS 0   Intent of treatment - curative   Prognosis: Good      S/P left hemicolectomy 12/21/2022   Perforated colon cancer      Signatera 2/16/2023 - 0.41 Positive   Signatera repeat - negative      Due to perforated colon cancer and T4 disease, the risk of recurrence is significant. I recommend adjuvant chemotherapy. Due to work constraints, I recommend adjuvant chemotherapy with CapOx for at least 3 months. Adjuvant chemotherapy    CapOx - 4 cycles               Cap 2 cycles    Continue surveillance  Labs in 3 months  CT in 6 months       2. Colitis iresolved              Plan:         Labs in 3 months    CT abd/pelvis by end of this month    Follow-up in 6 months    Signatera every 3 months  Colostomy reversal per Dr. Luis Alfredo Azul removal       Signed by: Tito Bryan MD                     July 5, 2023          NEO Beckman MD

## 2023-08-07 ENCOUNTER — HOSPITAL ENCOUNTER (OUTPATIENT)
Facility: HOSPITAL | Age: 58
Discharge: HOME | End: 2023-08-10
Attending: INTERNAL MEDICINE
Payer: COMMERCIAL

## 2023-08-07 DIAGNOSIS — C18.7 MALIGNANT NEOPLASM OF SIGMOID COLON (HCC): ICD-10-CM

## 2023-08-07 PROCEDURE — 2500000003 HC RX 250 WO HCPCS: Performed by: INTERNAL MEDICINE

## 2023-08-07 PROCEDURE — 74177 CT ABD & PELVIS W/CONTRAST: CPT

## 2023-08-07 PROCEDURE — 6360000004 HC RX CONTRAST MEDICATION: Performed by: INTERNAL MEDICINE

## 2023-08-07 RX ADMIN — BARIUM SULFATE 900 ML: 20 SUSPENSION ORAL at 10:17

## 2023-08-07 RX ADMIN — IOPAMIDOL 100 ML: 755 INJECTION, SOLUTION INTRAVENOUS at 10:17

## 2023-08-10 ENCOUNTER — HOSPITAL ENCOUNTER (OUTPATIENT)
Facility: HOSPITAL | Age: 58
End: 2023-08-10
Payer: COMMERCIAL

## 2023-08-10 ENCOUNTER — HOSPITAL ENCOUNTER (OUTPATIENT)
Facility: HOSPITAL | Age: 58
Discharge: HOME | End: 2023-08-10
Payer: COMMERCIAL

## 2023-08-10 VITALS
OXYGEN SATURATION: 97 % | DIASTOLIC BLOOD PRESSURE: 74 MMHG | RESPIRATION RATE: 16 BRPM | WEIGHT: 190.7 LBS | SYSTOLIC BLOOD PRESSURE: 121 MMHG | HEART RATE: 56 BPM | HEIGHT: 68 IN | TEMPERATURE: 98.4 F | BODY MASS INDEX: 28.9 KG/M2

## 2023-08-10 LAB
ABO + RH BLD: NORMAL
ALBUMIN SERPL-MCNC: 3.5 G/DL (ref 3.5–5)
ALBUMIN/GLOB SERPL: 0.9 (ref 1.1–2.2)
ALP SERPL-CCNC: 136 U/L (ref 45–117)
ALT SERPL-CCNC: 30 U/L (ref 12–78)
ANION GAP SERPL CALC-SCNC: 3 MMOL/L (ref 5–15)
APPEARANCE UR: CLEAR
APTT PPP: 25.9 SEC (ref 22.1–31)
AST SERPL-CCNC: 21 U/L (ref 15–37)
BACTERIA URNS QL MICRO: NEGATIVE /HPF
BILIRUB SERPL-MCNC: 0.6 MG/DL (ref 0.2–1)
BILIRUB UR QL: NEGATIVE
BLOOD GROUP ANTIBODIES SERPL: NORMAL
BUN SERPL-MCNC: 10 MG/DL (ref 6–20)
BUN/CREAT SERPL: 14 (ref 12–20)
CALCIUM SERPL-MCNC: 8.8 MG/DL (ref 8.5–10.1)
CHLORIDE SERPL-SCNC: 108 MMOL/L (ref 97–108)
CO2 SERPL-SCNC: 28 MMOL/L (ref 21–32)
COLOR UR: NORMAL
CREAT SERPL-MCNC: 0.69 MG/DL (ref 0.7–1.3)
EPITH CASTS URNS QL MICRO: NORMAL /LPF
ERYTHROCYTE [DISTWIDTH] IN BLOOD BY AUTOMATED COUNT: 12.5 % (ref 11.5–14.5)
EST. AVERAGE GLUCOSE BLD GHB EST-MCNC: 103 MG/DL
GLOBULIN SER CALC-MCNC: 3.9 G/DL (ref 2–4)
GLUCOSE SERPL-MCNC: 93 MG/DL (ref 65–100)
GLUCOSE UR STRIP.AUTO-MCNC: NEGATIVE MG/DL
HBA1C MFR BLD: 5.2 % (ref 4–5.6)
HCT VFR BLD AUTO: 40.1 % (ref 36.6–50.3)
HGB BLD-MCNC: 13.5 G/DL (ref 12.1–17)
HGB UR QL STRIP: NEGATIVE
HYALINE CASTS URNS QL MICRO: NORMAL /LPF (ref 0–2)
INR PPP: 0.9 (ref 0.9–1.1)
KETONES UR QL STRIP.AUTO: NEGATIVE MG/DL
LEUKOCYTE ESTERASE UR QL STRIP.AUTO: NEGATIVE
MAGNESIUM SERPL-MCNC: 2.3 MG/DL (ref 1.6–2.4)
MCH RBC QN AUTO: 38.1 PG (ref 26–34)
MCHC RBC AUTO-ENTMCNC: 33.7 G/DL (ref 30–36.5)
MCV RBC AUTO: 113.3 FL (ref 80–99)
NITRITE UR QL STRIP.AUTO: NEGATIVE
NRBC # BLD: 0 K/UL (ref 0–0.01)
NRBC BLD-RTO: 0 PER 100 WBC
PH UR STRIP: 7.5 (ref 5–8)
PHOSPHATE SERPL-MCNC: 3.9 MG/DL (ref 2.6–4.7)
PLATELET # BLD AUTO: 94 K/UL (ref 150–400)
PMV BLD AUTO: 10.4 FL (ref 8.9–12.9)
POTASSIUM SERPL-SCNC: 3.5 MMOL/L (ref 3.5–5.1)
PROT SERPL-MCNC: 7.4 G/DL (ref 6.4–8.2)
PROT UR STRIP-MCNC: NEGATIVE MG/DL
PROTHROMBIN TIME: 9.8 SEC (ref 9–11.1)
RBC # BLD AUTO: 3.54 M/UL (ref 4.1–5.7)
RBC #/AREA URNS HPF: NORMAL /HPF (ref 0–5)
SODIUM SERPL-SCNC: 139 MMOL/L (ref 136–145)
SP GR UR REFRACTOMETRY: 1.01
SPECIMEN EXP DATE BLD: NORMAL
THERAPEUTIC RANGE: NORMAL SECS (ref 58–77)
URINE CULTURE IF INDICATED: NORMAL
UROBILINOGEN UR QL STRIP.AUTO: 1 EU/DL (ref 0.2–1)
WBC # BLD AUTO: 4.1 K/UL (ref 4.1–11.1)
WBC URNS QL MICRO: NORMAL /HPF (ref 0–4)

## 2023-08-10 PROCEDURE — 85610 PROTHROMBIN TIME: CPT

## 2023-08-10 PROCEDURE — 85027 COMPLETE CBC AUTOMATED: CPT

## 2023-08-10 PROCEDURE — 86901 BLOOD TYPING SEROLOGIC RH(D): CPT

## 2023-08-10 PROCEDURE — 80053 COMPREHEN METABOLIC PANEL: CPT

## 2023-08-10 PROCEDURE — 85730 THROMBOPLASTIN TIME PARTIAL: CPT

## 2023-08-10 PROCEDURE — 86850 RBC ANTIBODY SCREEN: CPT

## 2023-08-10 PROCEDURE — 36415 COLL VENOUS BLD VENIPUNCTURE: CPT

## 2023-08-10 PROCEDURE — 83735 ASSAY OF MAGNESIUM: CPT

## 2023-08-10 PROCEDURE — 81001 URINALYSIS AUTO W/SCOPE: CPT

## 2023-08-10 PROCEDURE — 83036 HEMOGLOBIN GLYCOSYLATED A1C: CPT

## 2023-08-10 PROCEDURE — 71046 X-RAY EXAM CHEST 2 VIEWS: CPT

## 2023-08-10 PROCEDURE — 84100 ASSAY OF PHOSPHORUS: CPT

## 2023-08-10 PROCEDURE — 86900 BLOOD TYPING SEROLOGIC ABO: CPT

## 2023-08-10 RX ORDER — CELECOXIB 200 MG/1
200 CAPSULE ORAL ONCE
Status: CANCELLED | OUTPATIENT
Start: 2023-08-18

## 2023-08-10 RX ORDER — METOCLOPRAMIDE 10 MG/1
10 TABLET ORAL ONCE
Status: ON HOLD | COMMUNITY
Start: 2023-08-01

## 2023-08-10 RX ORDER — METRONIDAZOLE 500 MG/1
500 TABLET ORAL ONCE
Status: ON HOLD | COMMUNITY
Start: 2023-08-01

## 2023-08-10 RX ORDER — NEOMYCIN SULFATE 500 MG/1
500 TABLET ORAL ONCE
Status: ON HOLD | COMMUNITY
Start: 2023-08-01

## 2023-08-10 RX ORDER — ACETAMINOPHEN 500 MG
1000 TABLET ORAL ONCE
Status: CANCELLED | OUTPATIENT
Start: 2023-08-18

## 2023-08-10 RX ORDER — POLYETHYLENE GLYCOL 3350, SODIUM SULFATE ANHYDROUS, SODIUM BICARBONATE, SODIUM CHLORIDE, POTASSIUM CHLORIDE 236; 22.74; 6.74; 5.86; 2.97 G/4L; G/4L; G/4L; G/4L; G/4L
1 POWDER, FOR SOLUTION ORAL ONCE
Status: ON HOLD | COMMUNITY
Start: 2023-08-01

## 2023-08-10 RX ORDER — SODIUM CHLORIDE, SODIUM LACTATE, POTASSIUM CHLORIDE, CALCIUM CHLORIDE 600; 310; 30; 20 MG/100ML; MG/100ML; MG/100ML; MG/100ML
INJECTION, SOLUTION INTRAVENOUS CONTINUOUS
Status: CANCELLED | OUTPATIENT
Start: 2023-08-18

## 2023-08-10 ASSESSMENT — PAIN SCALES - GENERAL: PAINLEVEL_OUTOF10: 0

## 2023-08-10 NOTE — PROGRESS NOTES

## 2023-08-10 NOTE — PROGRESS NOTES
Start ensure surgery immuno-nutrition shakes on date (8/11)twice a day through date ( 8/15)  On date ( 8/16)eat a regular breakfast   On date (  8/16        ) start clear liquid diet at 12 noon   Begin bowel prep PEG 3350 at 12 noon the day before surgery and follow instructions per surgeon  Take metoclopramide at 6 am, 12 noon, and 5 pm the day before surgery  Take metronidazole at 8 am, 9 am, and 5 pm the day before surgery  Take neomycin at 8 am, 9 am, and 5 pm the day before surgery   Take presurgery drink one bottle the night before surgery before midnight and drink the second bottle 1 hour before time of arrival to the hospital

## 2023-08-11 LAB
BACTERIA SPEC CULT: NORMAL
BACTERIA SPEC CULT: NORMAL
SERVICE CMNT-IMP: NORMAL

## 2023-08-11 NOTE — PROGRESS NOTES
Spoke with Jevon Stephens, Dr. Justin Alejandra nurse and notified her that patient's platelet count was 94 on the labs drawn at PAT appointment.

## 2023-08-17 ENCOUNTER — HOSPITAL ENCOUNTER (OUTPATIENT)
Facility: HOSPITAL | Age: 58
Setting detail: OUTPATIENT SURGERY
Discharge: HOME | End: 2023-08-17
Attending: STUDENT IN AN ORGANIZED HEALTH CARE EDUCATION/TRAINING PROGRAM | Admitting: STUDENT IN AN ORGANIZED HEALTH CARE EDUCATION/TRAINING PROGRAM
Payer: COMMERCIAL

## 2023-08-17 ENCOUNTER — ANESTHESIA (OUTPATIENT)
Facility: HOSPITAL | Age: 58
End: 2023-08-17
Payer: COMMERCIAL

## 2023-08-17 ENCOUNTER — ANESTHESIA EVENT (OUTPATIENT)
Facility: HOSPITAL | Age: 58
End: 2023-08-17
Payer: COMMERCIAL

## 2023-08-17 VITALS
HEART RATE: 58 BPM | SYSTOLIC BLOOD PRESSURE: 137 MMHG | OXYGEN SATURATION: 97 % | WEIGHT: 191.2 LBS | BODY MASS INDEX: 28.98 KG/M2 | RESPIRATION RATE: 21 BRPM | TEMPERATURE: 97.8 F | HEIGHT: 68 IN | DIASTOLIC BLOOD PRESSURE: 85 MMHG

## 2023-08-17 PROCEDURE — 7100000011 HC PHASE II RECOVERY - ADDTL 15 MIN: Performed by: STUDENT IN AN ORGANIZED HEALTH CARE EDUCATION/TRAINING PROGRAM

## 2023-08-17 PROCEDURE — 3700000001 HC ADD 15 MINUTES (ANESTHESIA): Performed by: STUDENT IN AN ORGANIZED HEALTH CARE EDUCATION/TRAINING PROGRAM

## 2023-08-17 PROCEDURE — 2580000003 HC RX 258: Performed by: STUDENT IN AN ORGANIZED HEALTH CARE EDUCATION/TRAINING PROGRAM

## 2023-08-17 PROCEDURE — 3600007501: Performed by: STUDENT IN AN ORGANIZED HEALTH CARE EDUCATION/TRAINING PROGRAM

## 2023-08-17 PROCEDURE — 2709999900 HC NON-CHARGEABLE SUPPLY: Performed by: STUDENT IN AN ORGANIZED HEALTH CARE EDUCATION/TRAINING PROGRAM

## 2023-08-17 PROCEDURE — 2500000003 HC RX 250 WO HCPCS: Performed by: NURSE ANESTHETIST, CERTIFIED REGISTERED

## 2023-08-17 PROCEDURE — 3600007511: Performed by: STUDENT IN AN ORGANIZED HEALTH CARE EDUCATION/TRAINING PROGRAM

## 2023-08-17 PROCEDURE — 6360000002 HC RX W HCPCS: Performed by: NURSE ANESTHETIST, CERTIFIED REGISTERED

## 2023-08-17 PROCEDURE — 3700000000 HC ANESTHESIA ATTENDED CARE: Performed by: STUDENT IN AN ORGANIZED HEALTH CARE EDUCATION/TRAINING PROGRAM

## 2023-08-17 PROCEDURE — 7100000010 HC PHASE II RECOVERY - FIRST 15 MIN: Performed by: STUDENT IN AN ORGANIZED HEALTH CARE EDUCATION/TRAINING PROGRAM

## 2023-08-17 RX ORDER — SODIUM CHLORIDE 0.9 % (FLUSH) 0.9 %
5-40 SYRINGE (ML) INJECTION EVERY 12 HOURS SCHEDULED
Status: DISCONTINUED | OUTPATIENT
Start: 2023-08-17 | End: 2023-08-17 | Stop reason: HOSPADM

## 2023-08-17 RX ORDER — SODIUM CHLORIDE 0.9 % (FLUSH) 0.9 %
5-40 SYRINGE (ML) INJECTION PRN
Status: DISCONTINUED | OUTPATIENT
Start: 2023-08-17 | End: 2023-08-17 | Stop reason: HOSPADM

## 2023-08-17 RX ORDER — LIDOCAINE HYDROCHLORIDE 20 MG/ML
INJECTION, SOLUTION EPIDURAL; INFILTRATION; INTRACAUDAL; PERINEURAL PRN
Status: DISCONTINUED | OUTPATIENT
Start: 2023-08-17 | End: 2023-08-17 | Stop reason: SDUPTHER

## 2023-08-17 RX ORDER — SODIUM CHLORIDE 9 MG/ML
25 INJECTION, SOLUTION INTRAVENOUS PRN
Status: DISCONTINUED | OUTPATIENT
Start: 2023-08-17 | End: 2023-08-17 | Stop reason: HOSPADM

## 2023-08-17 RX ADMIN — SODIUM CHLORIDE 25 ML: 9 INJECTION, SOLUTION INTRAVENOUS at 07:49

## 2023-08-17 RX ADMIN — PROPOFOL 200 MG: 10 INJECTION, EMULSION INTRAVENOUS at 08:01

## 2023-08-17 RX ADMIN — SODIUM CHLORIDE: 9 INJECTION, SOLUTION INTRAVENOUS at 08:13

## 2023-08-17 RX ADMIN — LIDOCAINE HYDROCHLORIDE 40 MG: 20 INJECTION, SOLUTION EPIDURAL; INFILTRATION; INTRACAUDAL; PERINEURAL at 07:57

## 2023-08-17 ASSESSMENT — PAIN - FUNCTIONAL ASSESSMENT: PAIN_FUNCTIONAL_ASSESSMENT: 0-10

## 2023-08-17 ASSESSMENT — LIFESTYLE VARIABLES: SMOKING_STATUS: 1

## 2023-08-17 NOTE — PROGRESS NOTES
Endoscopy Case End Note:    2103:  Procedure scope was pre-cleaned, per protocol, at bedside by Tin ULRICH      0813:  Report received from anesthesia - ERASTO Koehler CRNA. See anesthesia flowsheet for intra-procedure vital signs and events.

## 2023-08-17 NOTE — DISCHARGE INSTRUCTIONS
Lucio Torres  [id number]  1965    COLON DISCHARGE INSTRUCTIONS  Discomfort:  Redness at IV site- apply warm compress to area; if redness or soreness persist- contact your physician  There may be a slight amount of blood passed from the rectum  Gaseous discomfort- walking, belching will help relieve any discomfort  You may not operate a vehicle for 12 hours  You may not engage in an occupation involving machinery or appliances for rest of today  You may not drink alcoholic beverages for at least 12 hours  Avoid making any critical decisions for at least 24 hour  DIET:   Clear liquid diet today, NPO at midnight        ACTIVITY:  You may resume your normal daily activities it is recommended that you spend the remainder of the day resting -  avoid any strenuous activity. CALL M.D. ANY SIGN OF:   Increasing pain, nausea, vomiting  Abdominal distension (swelling)  New increased bleeding (oral or rectal)  Fever (chills)  Pain in chest area  Bloody discharge from nose or mouth  Shortness of breath     Follow-up Instructions:   Call Marcelina Mcallister MD if any questions or problems. Telephone # [phone]  Biopsy results will be available in  7 to10 days  Should have a repeat colonoscopy in 1 year after colostomy reversal.    COLONOSCOPY FINDINGS:  Your colonoscopy showed: normal remaining colon.   Patient Education on Sedation / Analgesia Administered for Procedure      For 24 hours after general anesthesia or intravenous analgesia / sedation:  Have someone responsible help you with your care  Limit your activities  Do not drive and operate hazardous machinery  Do not make important personal, legal or business decisions  Do not drink alcoholic beverages  If you have not urinated within 8 hours after discharge, please contact your physician  Resume your medications unless otherwise instructed    For 24 hours after general anesthesia or intravenous analgesia / sedation  you may experience:  Drowsiness, dizziness,

## 2023-08-17 NOTE — OP NOTE
Colonoscopy Procedure Note     Procedure: Colonoscopy     Indications: completion colonoscopy prior to colostomy closure     Procedure Details      Informed consent was obtained for the procedure. Risks of perforation and hemorrhage were discussed. The patient was placed in the left lateral decubitus position, the anal region was examined, a rectal performed, then the colonoscope was inserted and advanced without difficulty. The prep was fair. The instrument was withdrawn and retroflexed with good views throughout. Findings:  SIGMOID COLON: surgically absent  DESCENDING COLON: The mucosa is normal with good vascular pattern and without ulcers, diverticula, and polyps. TRANSVERSE COLON: The mucosa is normal with good vascular pattern and without ulcers, diverticula, and polyps. ASCENDING COLON: The mucosa is normal with good vascular pattern and without ulcers, diverticula, and polyps. CECUM: The appendiceal orifice appears normal. The ileocecal valve appears normal.   TERMINAL ILEUM: The terminal ileum was not entered. Specimens: none           Complications:  None; patient tolerated the procedure well. Disposition: PACU - hemodynamically stable. Condition: stable     Impression:    Normal remaining colon     Recommendations:  Repeat colonoscopy in 1 years after colostomy closure.

## 2023-08-17 NOTE — PROGRESS NOTES
ARRIVAL INFORMATION:  Verified patient name and date of birth, scheduled procedure, and informed consent. : Shaun Rose (spouse) contact number: [phone]  Physician and staff can share information with the . Belongings with patient include:  Clothing,None        GI FOCUSED ASSESSMENT:  Neuro: Awake, alert, oriented x4  Respiratory: even and unlabored   GI: soft and non-distended  EKG Rhythm: sinus bradycardia    Education:Reviewed general discharge instructions and  information. Patient reports drinking approximately 2 cups of black coffee with 2 tbsp of sugar per cup around 0600. Notified Dr. Xavier Rahman. Verbal orders per Dr. Xavier Rahman to delay procedure until 0800. Dr. Michelina Severs notified.

## 2023-08-17 NOTE — ANESTHESIA POSTPROCEDURE EVALUATION
Department of Anesthesiology  Postprocedure Note    Patient: Katelyn Wakefield  MRN: [id number]  YOB: 1965  Date of evaluation: 8/17/2023      Procedure Summary     Date: 08/17/23 Room / Location: [facility] ENDO 01 / [facility] ENDOSCOPY    Anesthesia Start: 0750 Anesthesia Stop: 2887    Procedure: COLONOSCOPY (Lower GI Region) Diagnosis:       Malignant neoplasm of colon, unspecified part of colon ([address])      (Malignant neoplasm of colon, unspecified part of colon ([address]) [C18.9])    Surgeons: Lindsey Gallagher MD Responsible Provider: Ky Patel MD    Anesthesia Type: MAC ASA Status: 2          Anesthesia Type: No value filed.     Laila Phase I: Laila Score: 10    Laila Phase II: Laila Score: 10      Anesthesia Post Evaluation    Patient location during evaluation: PACU  Patient participation: complete - patient participated  Level of consciousness: sleepy but conscious and responsive to verbal stimuli  Airway patency: patent  Nausea & Vomiting: no vomiting and no nausea  Complications: no  Cardiovascular status: blood pressure returned to baseline and hemodynamically stable  Respiratory status: acceptable  Hydration status: stable

## 2023-08-18 ENCOUNTER — ANESTHESIA EVENT (OUTPATIENT)
Facility: HOSPITAL | Age: 58
End: 2023-08-18
Payer: COMMERCIAL

## 2023-08-18 ENCOUNTER — ANESTHESIA (OUTPATIENT)
Facility: HOSPITAL | Age: 58
End: 2023-08-18
Payer: COMMERCIAL

## 2023-08-18 ENCOUNTER — HOSPITAL ENCOUNTER (INPATIENT)
Facility: HOSPITAL | Age: 58
LOS: 3 days | Discharge: HOME | DRG: 331 | End: 2023-08-21
Attending: STUDENT IN AN ORGANIZED HEALTH CARE EDUCATION/TRAINING PROGRAM | Admitting: STUDENT IN AN ORGANIZED HEALTH CARE EDUCATION/TRAINING PROGRAM
Payer: COMMERCIAL

## 2023-08-18 DIAGNOSIS — Z93.3 COLOSTOMY IN PLACE (HCC): Primary | ICD-10-CM

## 2023-08-18 PROCEDURE — 3700000000 HC ANESTHESIA ATTENDED CARE: Performed by: STUDENT IN AN ORGANIZED HEALTH CARE EDUCATION/TRAINING PROGRAM

## 2023-08-18 PROCEDURE — 0T9B80Z DRAINAGE OF BLADDER WITH DRAINAGE DEVICE, VIA NATURAL OR ARTIFICIAL OPENING ENDOSCOPIC: ICD-10-PCS | Performed by: UROLOGY

## 2023-08-18 PROCEDURE — 7100000001 HC PACU RECOVERY - ADDTL 15 MIN: Performed by: STUDENT IN AN ORGANIZED HEALTH CARE EDUCATION/TRAINING PROGRAM

## 2023-08-18 PROCEDURE — 2580000003 HC RX 258: Performed by: STUDENT IN AN ORGANIZED HEALTH CARE EDUCATION/TRAINING PROGRAM

## 2023-08-18 PROCEDURE — 7100000000 HC PACU RECOVERY - FIRST 15 MIN: Performed by: STUDENT IN AN ORGANIZED HEALTH CARE EDUCATION/TRAINING PROGRAM

## 2023-08-18 PROCEDURE — C1758 CATHETER, URETERAL: HCPCS | Performed by: STUDENT IN AN ORGANIZED HEALTH CARE EDUCATION/TRAINING PROGRAM

## 2023-08-18 PROCEDURE — 6360000002 HC RX W HCPCS: Performed by: ANESTHESIOLOGY

## 2023-08-18 PROCEDURE — 2500000003 HC RX 250 WO HCPCS: Performed by: NURSE ANESTHETIST, CERTIFIED REGISTERED

## 2023-08-18 PROCEDURE — 6370000000 HC RX 637 (ALT 250 FOR IP): Performed by: STUDENT IN AN ORGANIZED HEALTH CARE EDUCATION/TRAINING PROGRAM

## 2023-08-18 PROCEDURE — 1100000000 HC RM PRIVATE

## 2023-08-18 PROCEDURE — 2500000003 HC RX 250 WO HCPCS: Performed by: STUDENT IN AN ORGANIZED HEALTH CARE EDUCATION/TRAINING PROGRAM

## 2023-08-18 PROCEDURE — 2720000010 HC SURG SUPPLY STERILE: Performed by: STUDENT IN AN ORGANIZED HEALTH CARE EDUCATION/TRAINING PROGRAM

## 2023-08-18 PROCEDURE — 6360000002 HC RX W HCPCS: Performed by: STUDENT IN AN ORGANIZED HEALTH CARE EDUCATION/TRAINING PROGRAM

## 2023-08-18 PROCEDURE — C1769 GUIDE WIRE: HCPCS | Performed by: STUDENT IN AN ORGANIZED HEALTH CARE EDUCATION/TRAINING PROGRAM

## 2023-08-18 PROCEDURE — 0DBP4ZZ EXCISION OF RECTUM, PERCUTANEOUS ENDOSCOPIC APPROACH: ICD-10-PCS | Performed by: STUDENT IN AN ORGANIZED HEALTH CARE EDUCATION/TRAINING PROGRAM

## 2023-08-18 PROCEDURE — 0DJD8ZZ INSPECTION OF LOWER INTESTINAL TRACT, VIA NATURAL OR ARTIFICIAL OPENING ENDOSCOPIC: ICD-10-PCS | Performed by: STUDENT IN AN ORGANIZED HEALTH CARE EDUCATION/TRAINING PROGRAM

## 2023-08-18 PROCEDURE — 3600000009 HC SURGERY ROBOT BASE: Performed by: STUDENT IN AN ORGANIZED HEALTH CARE EDUCATION/TRAINING PROGRAM

## 2023-08-18 PROCEDURE — S2900 ROBOTIC SURGICAL SYSTEM: HCPCS | Performed by: STUDENT IN AN ORGANIZED HEALTH CARE EDUCATION/TRAINING PROGRAM

## 2023-08-18 PROCEDURE — 88307 TISSUE EXAM BY PATHOLOGIST: CPT

## 2023-08-18 PROCEDURE — 6360000002 HC RX W HCPCS: Performed by: NURSE ANESTHETIST, CERTIFIED REGISTERED

## 2023-08-18 PROCEDURE — 88305 TISSUE EXAM BY PATHOLOGIST: CPT

## 2023-08-18 PROCEDURE — 3700000001 HC ADD 15 MINUTES (ANESTHESIA): Performed by: STUDENT IN AN ORGANIZED HEALTH CARE EDUCATION/TRAINING PROGRAM

## 2023-08-18 PROCEDURE — 2500000003 HC RX 250 WO HCPCS: Performed by: ANESTHESIOLOGY

## 2023-08-18 PROCEDURE — 3600000019 HC SURGERY ROBOT ADDTL 15MIN: Performed by: STUDENT IN AN ORGANIZED HEALTH CARE EDUCATION/TRAINING PROGRAM

## 2023-08-18 PROCEDURE — 88304 TISSUE EXAM BY PATHOLOGIST: CPT

## 2023-08-18 PROCEDURE — 0DBL4ZZ EXCISION OF TRANSVERSE COLON, PERCUTANEOUS ENDOSCOPIC APPROACH: ICD-10-PCS | Performed by: STUDENT IN AN ORGANIZED HEALTH CARE EDUCATION/TRAINING PROGRAM

## 2023-08-18 PROCEDURE — 2709999900 HC NON-CHARGEABLE SUPPLY: Performed by: STUDENT IN AN ORGANIZED HEALTH CARE EDUCATION/TRAINING PROGRAM

## 2023-08-18 RX ORDER — ENOXAPARIN SODIUM 100 MG/ML
40 INJECTION SUBCUTANEOUS DAILY
Status: DISCONTINUED | OUTPATIENT
Start: 2023-08-18 | End: 2023-08-21 | Stop reason: HOSPADM

## 2023-08-18 RX ORDER — HYDROMORPHONE HYDROCHLORIDE 1 MG/ML
0.2 INJECTION, SOLUTION INTRAMUSCULAR; INTRAVENOUS; SUBCUTANEOUS
Status: DISCONTINUED | OUTPATIENT
Start: 2023-08-18 | End: 2023-08-21 | Stop reason: HOSPADM

## 2023-08-18 RX ORDER — SODIUM CHLORIDE 9 MG/ML
INJECTION, SOLUTION INTRAVENOUS PRN
Status: DISCONTINUED | OUTPATIENT
Start: 2023-08-18 | End: 2023-08-18 | Stop reason: HOSPADM

## 2023-08-18 RX ORDER — OXYCODONE HYDROCHLORIDE 5 MG/1
5 TABLET ORAL EVERY 4 HOURS PRN
Status: DISCONTINUED | OUTPATIENT
Start: 2023-08-18 | End: 2023-08-21 | Stop reason: HOSPADM

## 2023-08-18 RX ORDER — DEXMEDETOMIDINE HYDROCHLORIDE 100 UG/ML
INJECTION, SOLUTION INTRAVENOUS PRN
Status: DISCONTINUED | OUTPATIENT
Start: 2023-08-18 | End: 2023-08-18 | Stop reason: SDUPTHER

## 2023-08-18 RX ORDER — ACETAMINOPHEN 325 MG/1
650 TABLET ORAL EVERY 6 HOURS
Status: DISCONTINUED | OUTPATIENT
Start: 2023-08-18 | End: 2023-08-21 | Stop reason: HOSPADM

## 2023-08-18 RX ORDER — KETOROLAC TROMETHAMINE 30 MG/ML
15 INJECTION, SOLUTION INTRAMUSCULAR; INTRAVENOUS EVERY 6 HOURS
Status: DISCONTINUED | OUTPATIENT
Start: 2023-08-18 | End: 2023-08-21 | Stop reason: HOSPADM

## 2023-08-18 RX ORDER — GLYCOPYRROLATE 0.2 MG/ML
INJECTION INTRAMUSCULAR; INTRAVENOUS PRN
Status: DISCONTINUED | OUTPATIENT
Start: 2023-08-18 | End: 2023-08-18 | Stop reason: SDUPTHER

## 2023-08-18 RX ORDER — MAGNESIUM SULFATE IN WATER 40 MG/ML
INJECTION, SOLUTION INTRAVENOUS PRN
Status: DISCONTINUED | OUTPATIENT
Start: 2023-08-18 | End: 2023-08-18 | Stop reason: SDUPTHER

## 2023-08-18 RX ORDER — ONDANSETRON 4 MG/1
4 TABLET, ORALLY DISINTEGRATING ORAL EVERY 8 HOURS PRN
Status: DISCONTINUED | OUTPATIENT
Start: 2023-08-18 | End: 2023-08-21 | Stop reason: HOSPADM

## 2023-08-18 RX ORDER — NICOTINE 21 MG/24HR
1 PATCH, TRANSDERMAL 24 HOURS TRANSDERMAL DAILY
Status: DISCONTINUED | OUTPATIENT
Start: 2023-08-18 | End: 2023-08-21 | Stop reason: HOSPADM

## 2023-08-18 RX ORDER — LIDOCAINE HYDROCHLORIDE 10 MG/ML
1 INJECTION, SOLUTION EPIDURAL; INFILTRATION; INTRACAUDAL; PERINEURAL
Status: DISCONTINUED | OUTPATIENT
Start: 2023-08-18 | End: 2023-08-18 | Stop reason: HOSPADM

## 2023-08-18 RX ORDER — ACETAMINOPHEN 500 MG
1000 TABLET ORAL ONCE
Status: COMPLETED | OUTPATIENT
Start: 2023-08-18 | End: 2023-08-18

## 2023-08-18 RX ORDER — SODIUM CHLORIDE, SODIUM LACTATE, POTASSIUM CHLORIDE, CALCIUM CHLORIDE 600; 310; 30; 20 MG/100ML; MG/100ML; MG/100ML; MG/100ML
INJECTION, SOLUTION INTRAVENOUS CONTINUOUS
Status: DISCONTINUED | OUTPATIENT
Start: 2023-08-18 | End: 2023-08-19

## 2023-08-18 RX ORDER — MIDAZOLAM HYDROCHLORIDE 1 MG/ML
INJECTION INTRAMUSCULAR; INTRAVENOUS PRN
Status: DISCONTINUED | OUTPATIENT
Start: 2023-08-18 | End: 2023-08-18 | Stop reason: SDUPTHER

## 2023-08-18 RX ORDER — SODIUM CHLORIDE, SODIUM LACTATE, POTASSIUM CHLORIDE, CALCIUM CHLORIDE 600; 310; 30; 20 MG/100ML; MG/100ML; MG/100ML; MG/100ML
INJECTION, SOLUTION INTRAVENOUS CONTINUOUS
Status: DISCONTINUED | OUTPATIENT
Start: 2023-08-18 | End: 2023-08-18 | Stop reason: HOSPADM

## 2023-08-18 RX ORDER — SODIUM CHLORIDE 0.9 % (FLUSH) 0.9 %
5-40 SYRINGE (ML) INJECTION EVERY 12 HOURS SCHEDULED
Status: DISCONTINUED | OUTPATIENT
Start: 2023-08-18 | End: 2023-08-21 | Stop reason: HOSPADM

## 2023-08-18 RX ORDER — PHENYLEPHRINE HCL IN 0.9% NACL 0.4MG/10ML
SYRINGE (ML) INTRAVENOUS PRN
Status: DISCONTINUED | OUTPATIENT
Start: 2023-08-18 | End: 2023-08-18 | Stop reason: SDUPTHER

## 2023-08-18 RX ORDER — ONDANSETRON 2 MG/ML
4 INJECTION INTRAMUSCULAR; INTRAVENOUS EVERY 6 HOURS PRN
Status: DISCONTINUED | OUTPATIENT
Start: 2023-08-18 | End: 2023-08-21 | Stop reason: HOSPADM

## 2023-08-18 RX ORDER — HYDROMORPHONE HYDROCHLORIDE 2 MG/ML
INJECTION, SOLUTION INTRAMUSCULAR; INTRAVENOUS; SUBCUTANEOUS PRN
Status: DISCONTINUED | OUTPATIENT
Start: 2023-08-18 | End: 2023-08-18 | Stop reason: SDUPTHER

## 2023-08-18 RX ORDER — OXYCODONE HYDROCHLORIDE 5 MG/1
10 TABLET ORAL EVERY 4 HOURS PRN
Status: DISCONTINUED | OUTPATIENT
Start: 2023-08-18 | End: 2023-08-21 | Stop reason: HOSPADM

## 2023-08-18 RX ORDER — ONDANSETRON 2 MG/ML
4 INJECTION INTRAMUSCULAR; INTRAVENOUS
Status: DISCONTINUED | OUTPATIENT
Start: 2023-08-18 | End: 2023-08-18 | Stop reason: HOSPADM

## 2023-08-18 RX ORDER — HYDROMORPHONE HYDROCHLORIDE 2 MG/ML
INJECTION, SOLUTION INTRAMUSCULAR; INTRAVENOUS; SUBCUTANEOUS PRN
Status: DISCONTINUED | OUTPATIENT
Start: 2023-08-18 | End: 2023-08-18

## 2023-08-18 RX ORDER — ONDANSETRON 2 MG/ML
INJECTION INTRAMUSCULAR; INTRAVENOUS PRN
Status: DISCONTINUED | OUTPATIENT
Start: 2023-08-18 | End: 2023-08-18 | Stop reason: SDUPTHER

## 2023-08-18 RX ORDER — DEXAMETHASONE SODIUM PHOSPHATE 4 MG/ML
INJECTION, SOLUTION INTRA-ARTICULAR; INTRALESIONAL; INTRAMUSCULAR; INTRAVENOUS; SOFT TISSUE PRN
Status: DISCONTINUED | OUTPATIENT
Start: 2023-08-18 | End: 2023-08-18 | Stop reason: SDUPTHER

## 2023-08-18 RX ORDER — SODIUM CHLORIDE 0.9 % (FLUSH) 0.9 %
5-40 SYRINGE (ML) INJECTION EVERY 12 HOURS SCHEDULED
Status: DISCONTINUED | OUTPATIENT
Start: 2023-08-18 | End: 2023-08-18 | Stop reason: HOSPADM

## 2023-08-18 RX ORDER — HYDROMORPHONE HYDROCHLORIDE 1 MG/ML
0.5 INJECTION, SOLUTION INTRAMUSCULAR; INTRAVENOUS; SUBCUTANEOUS EVERY 5 MIN PRN
Status: COMPLETED | OUTPATIENT
Start: 2023-08-18 | End: 2023-08-18

## 2023-08-18 RX ORDER — INDOCYANINE GREEN AND WATER 25 MG
KIT INJECTION PRN
Status: DISCONTINUED | OUTPATIENT
Start: 2023-08-18 | End: 2023-08-18 | Stop reason: SDUPTHER

## 2023-08-18 RX ORDER — PROPOFOL 10 MG/ML
INJECTION, EMULSION INTRAVENOUS PRN
Status: DISCONTINUED | OUTPATIENT
Start: 2023-08-18 | End: 2023-08-18 | Stop reason: SDUPTHER

## 2023-08-18 RX ORDER — LIDOCAINE HYDROCHLORIDE 20 MG/ML
INJECTION, SOLUTION EPIDURAL; INFILTRATION; INTRACAUDAL; PERINEURAL PRN
Status: DISCONTINUED | OUTPATIENT
Start: 2023-08-18 | End: 2023-08-18 | Stop reason: SDUPTHER

## 2023-08-18 RX ORDER — MEPERIDINE HYDROCHLORIDE 25 MG/ML
12.5 INJECTION INTRAMUSCULAR; INTRAVENOUS; SUBCUTANEOUS EVERY 5 MIN PRN
Status: DISCONTINUED | OUTPATIENT
Start: 2023-08-18 | End: 2023-08-18 | Stop reason: HOSPADM

## 2023-08-18 RX ORDER — SODIUM CHLORIDE 0.9 % (FLUSH) 0.9 %
5-40 SYRINGE (ML) INJECTION PRN
Status: DISCONTINUED | OUTPATIENT
Start: 2023-08-18 | End: 2023-08-18 | Stop reason: HOSPADM

## 2023-08-18 RX ORDER — FENTANYL CITRATE 50 UG/ML
50 INJECTION, SOLUTION INTRAMUSCULAR; INTRAVENOUS EVERY 5 MIN PRN
Status: COMPLETED | OUTPATIENT
Start: 2023-08-18 | End: 2023-08-18

## 2023-08-18 RX ORDER — NEOSTIGMINE METHYLSULFATE 1 MG/ML
INJECTION, SOLUTION INTRAVENOUS PRN
Status: DISCONTINUED | OUTPATIENT
Start: 2023-08-18 | End: 2023-08-18 | Stop reason: SDUPTHER

## 2023-08-18 RX ORDER — PROCHLORPERAZINE EDISYLATE 5 MG/ML
5 INJECTION INTRAMUSCULAR; INTRAVENOUS
Status: COMPLETED | OUTPATIENT
Start: 2023-08-18 | End: 2023-08-18

## 2023-08-18 RX ORDER — SODIUM CHLORIDE 9 MG/ML
INJECTION, SOLUTION INTRAVENOUS PRN
Status: DISCONTINUED | OUTPATIENT
Start: 2023-08-18 | End: 2023-08-21 | Stop reason: HOSPADM

## 2023-08-18 RX ORDER — LIDOCAINE HYDROCHLORIDE ANHYDROUS AND DEXTROSE MONOHYDRATE .8; 5 G/100ML; G/100ML
INJECTION, SOLUTION INTRAVENOUS CONTINUOUS PRN
Status: DISCONTINUED | OUTPATIENT
Start: 2023-08-18 | End: 2023-08-18 | Stop reason: SDUPTHER

## 2023-08-18 RX ORDER — KETAMINE HCL IN NACL, ISO-OSM 100MG/10ML
SYRINGE (ML) INJECTION PRN
Status: DISCONTINUED | OUTPATIENT
Start: 2023-08-18 | End: 2023-08-18 | Stop reason: SDUPTHER

## 2023-08-18 RX ORDER — CELECOXIB 200 MG/1
200 CAPSULE ORAL ONCE
Status: COMPLETED | OUTPATIENT
Start: 2023-08-18 | End: 2023-08-18

## 2023-08-18 RX ORDER — BUPIVACAINE HYDROCHLORIDE 5 MG/ML
INJECTION, SOLUTION EPIDURAL; INTRACAUDAL PRN
Status: DISCONTINUED | OUTPATIENT
Start: 2023-08-18 | End: 2023-08-18 | Stop reason: ALTCHOICE

## 2023-08-18 RX ORDER — ROCURONIUM BROMIDE 10 MG/ML
INJECTION, SOLUTION INTRAVENOUS PRN
Status: DISCONTINUED | OUTPATIENT
Start: 2023-08-18 | End: 2023-08-18 | Stop reason: SDUPTHER

## 2023-08-18 RX ORDER — CEFOXITIN 2 G/1
INJECTION, POWDER, FOR SOLUTION INTRAVENOUS PRN
Status: DISCONTINUED | OUTPATIENT
Start: 2023-08-18 | End: 2023-08-18 | Stop reason: SDUPTHER

## 2023-08-18 RX ORDER — SODIUM CHLORIDE 0.9 % (FLUSH) 0.9 %
5-40 SYRINGE (ML) INJECTION PRN
Status: DISCONTINUED | OUTPATIENT
Start: 2023-08-18 | End: 2023-08-21 | Stop reason: HOSPADM

## 2023-08-18 RX ADMIN — MAGNESIUM SULFATE IN WATER 2000 MG: 40 INJECTION, SOLUTION INTRAVENOUS at 08:01

## 2023-08-18 RX ADMIN — HYDROMORPHONE HYDROCHLORIDE 0.5 MG: 1 INJECTION, SOLUTION INTRAMUSCULAR; INTRAVENOUS; SUBCUTANEOUS at 14:04

## 2023-08-18 RX ADMIN — PROPOFOL 30 MG: 10 INJECTION, EMULSION INTRAVENOUS at 10:17

## 2023-08-18 RX ADMIN — FENTANYL CITRATE 50 MCG: 50 INJECTION, SOLUTION INTRAMUSCULAR; INTRAVENOUS at 11:34

## 2023-08-18 RX ADMIN — FENTANYL CITRATE 50 MCG: 50 INJECTION, SOLUTION INTRAMUSCULAR; INTRAVENOUS at 11:54

## 2023-08-18 RX ADMIN — OXYCODONE HYDROCHLORIDE 10 MG: 5 TABLET ORAL at 19:49

## 2023-08-18 RX ADMIN — MIDAZOLAM 2 MG: 1 INJECTION INTRAMUSCULAR; INTRAVENOUS at 07:33

## 2023-08-18 RX ADMIN — PROPOFOL 150 MG: 10 INJECTION, EMULSION INTRAVENOUS at 07:48

## 2023-08-18 RX ADMIN — CEFOXITIN 2000 MG: 2 INJECTION, POWDER, FOR SOLUTION INTRAVENOUS at 10:28

## 2023-08-18 RX ADMIN — ROCURONIUM BROMIDE 40 MG: 10 INJECTION INTRAVENOUS at 07:48

## 2023-08-18 RX ADMIN — CEFOXITIN 2000 MG: 2 INJECTION, POWDER, FOR SOLUTION INTRAVENOUS at 07:59

## 2023-08-18 RX ADMIN — DEXAMETHASONE SODIUM PHOSPHATE 4 MG: 4 INJECTION, SOLUTION INTRAMUSCULAR; INTRAVENOUS at 08:01

## 2023-08-18 RX ADMIN — Medication 3 MG: at 10:43

## 2023-08-18 RX ADMIN — ACETAMINOPHEN 650 MG: 325 TABLET ORAL at 16:36

## 2023-08-18 RX ADMIN — LIDOCAINE HYDROCHLORIDE 2 MG/KG/HR: 8 INJECTION, SOLUTION INTRAVENOUS at 08:28

## 2023-08-18 RX ADMIN — HYDROMORPHONE HYDROCHLORIDE 0.5 MG: 1 INJECTION, SOLUTION INTRAMUSCULAR; INTRAVENOUS; SUBCUTANEOUS at 12:39

## 2023-08-18 RX ADMIN — HYDROMORPHONE HYDROCHLORIDE 1 MG: 2 INJECTION INTRAMUSCULAR; INTRAVENOUS; SUBCUTANEOUS at 07:33

## 2023-08-18 RX ADMIN — Medication 1 AMPULE: at 16:42

## 2023-08-18 RX ADMIN — KETOROLAC TROMETHAMINE 15 MG: 30 INJECTION, SOLUTION INTRAMUSCULAR; INTRAVENOUS at 15:07

## 2023-08-18 RX ADMIN — ROCURONIUM BROMIDE 10 MG: 10 INJECTION INTRAVENOUS at 07:59

## 2023-08-18 RX ADMIN — HYDROMORPHONE HYDROCHLORIDE 0.2 MG: 1 INJECTION, SOLUTION INTRAMUSCULAR; INTRAVENOUS; SUBCUTANEOUS at 17:17

## 2023-08-18 RX ADMIN — Medication 80 MCG: at 08:46

## 2023-08-18 RX ADMIN — SODIUM CHLORIDE, POTASSIUM CHLORIDE, SODIUM LACTATE AND CALCIUM CHLORIDE: 600; 310; 30; 20 INJECTION, SOLUTION INTRAVENOUS at 10:56

## 2023-08-18 RX ADMIN — GLYCOPYRROLATE 0.4 MG: 0.2 INJECTION, SOLUTION INTRAMUSCULAR; INTRAVENOUS at 10:43

## 2023-08-18 RX ADMIN — SODIUM CHLORIDE, POTASSIUM CHLORIDE, SODIUM LACTATE AND CALCIUM CHLORIDE: 600; 310; 30; 20 INJECTION, SOLUTION INTRAVENOUS at 15:59

## 2023-08-18 RX ADMIN — LIDOCAINE HYDROCHLORIDE 100 MG: 20 INJECTION, SOLUTION EPIDURAL; INFILTRATION; INTRACAUDAL; PERINEURAL at 07:48

## 2023-08-18 RX ADMIN — Medication 80 MCG: at 10:27

## 2023-08-18 RX ADMIN — CELECOXIB 200 MG: 200 CAPSULE ORAL at 06:44

## 2023-08-18 RX ADMIN — Medication 80 MCG: at 08:38

## 2023-08-18 RX ADMIN — FENTANYL CITRATE 50 MCG: 50 INJECTION, SOLUTION INTRAMUSCULAR; INTRAVENOUS at 11:25

## 2023-08-18 RX ADMIN — INDOCYANINE GREEN 5 MG: KIT INTRAVENOUS at 10:07

## 2023-08-18 RX ADMIN — KETOROLAC TROMETHAMINE 15 MG: 30 INJECTION, SOLUTION INTRAMUSCULAR; INTRAVENOUS at 21:05

## 2023-08-18 RX ADMIN — HYDROMORPHONE HYDROCHLORIDE 1 MG: 2 INJECTION INTRAMUSCULAR; INTRAVENOUS; SUBCUTANEOUS at 10:53

## 2023-08-18 RX ADMIN — SODIUM CHLORIDE, POTASSIUM CHLORIDE, SODIUM LACTATE AND CALCIUM CHLORIDE: 600; 310; 30; 20 INJECTION, SOLUTION INTRAVENOUS at 07:04

## 2023-08-18 RX ADMIN — DEXMEDETOMIDINE HYDROCHLORIDE 6 MCG: 100 INJECTION, SOLUTION, CONCENTRATE INTRAVENOUS at 07:55

## 2023-08-18 RX ADMIN — PROCHLORPERAZINE EDISYLATE 5 MG: 5 INJECTION INTRAMUSCULAR; INTRAVENOUS at 14:01

## 2023-08-18 RX ADMIN — ACETAMINOPHEN 1000 MG: 500 TABLET ORAL at 06:44

## 2023-08-18 RX ADMIN — Medication 20 MG: at 07:48

## 2023-08-18 RX ADMIN — ACETAMINOPHEN 650 MG: 325 TABLET ORAL at 21:05

## 2023-08-18 RX ADMIN — ENOXAPARIN SODIUM 40 MG: 100 INJECTION SUBCUTANEOUS at 16:36

## 2023-08-18 RX ADMIN — FENTANYL CITRATE 50 MCG: 50 INJECTION, SOLUTION INTRAMUSCULAR; INTRAVENOUS at 13:45

## 2023-08-18 RX ADMIN — OXYCODONE HYDROCHLORIDE 5 MG: 5 TABLET ORAL at 15:08

## 2023-08-18 RX ADMIN — ONDANSETRON HYDROCHLORIDE 4 MG: 2 INJECTION, SOLUTION INTRAMUSCULAR; INTRAVENOUS at 10:20

## 2023-08-18 RX ADMIN — DEXMEDETOMIDINE HYDROCHLORIDE 6 MCG: 100 INJECTION, SOLUTION, CONCENTRATE INTRAVENOUS at 09:07

## 2023-08-18 RX ADMIN — ROCURONIUM BROMIDE 20 MG: 10 INJECTION INTRAVENOUS at 09:06

## 2023-08-18 RX ADMIN — ROCURONIUM BROMIDE 20 MG: 10 INJECTION INTRAVENOUS at 08:25

## 2023-08-18 ASSESSMENT — PAIN DESCRIPTION - DESCRIPTORS
DESCRIPTORS: SORE
DESCRIPTORS: ACHING
DESCRIPTORS: BURNING
DESCRIPTORS: ACHING;SORE
DESCRIPTORS: SORE;TENDER
DESCRIPTORS: SORE
DESCRIPTORS: ACHING
DESCRIPTORS: SORE
DESCRIPTORS: SORE

## 2023-08-18 ASSESSMENT — PAIN SCALES - GENERAL
PAINLEVEL_OUTOF10: 5
PAINLEVEL_OUTOF10: 5
PAINLEVEL_OUTOF10: 8
PAINLEVEL_OUTOF10: 7
PAINLEVEL_OUTOF10: 4
PAINLEVEL_OUTOF10: 5
PAINLEVEL_OUTOF10: 4
PAINLEVEL_OUTOF10: 4
PAINLEVEL_OUTOF10: 7
PAINLEVEL_OUTOF10: 5
PAINLEVEL_OUTOF10: 4
PAINLEVEL_OUTOF10: 0
PAINLEVEL_OUTOF10: 5
PAINLEVEL_OUTOF10: 7
PAINLEVEL_OUTOF10: 4
PAINLEVEL_OUTOF10: 5
PAINLEVEL_OUTOF10: 4

## 2023-08-18 ASSESSMENT — PAIN DESCRIPTION - LOCATION
LOCATION: ABDOMEN

## 2023-08-18 ASSESSMENT — PAIN DESCRIPTION - ORIENTATION
ORIENTATION: MID
ORIENTATION: LOWER
ORIENTATION: MID
ORIENTATION: RIGHT
ORIENTATION: MID

## 2023-08-18 ASSESSMENT — PAIN DESCRIPTION - PAIN TYPE
TYPE: SURGICAL PAIN

## 2023-08-18 ASSESSMENT — LIFESTYLE VARIABLES: SMOKING_STATUS: 1

## 2023-08-18 ASSESSMENT — PAIN - FUNCTIONAL ASSESSMENT: PAIN_FUNCTIONAL_ASSESSMENT: 0-10

## 2023-08-18 NOTE — BRIEF OP NOTE
Brief Postoperative Note      Patient: David Brennan  YOB: 1965  MRN: [id number]    Date of Procedure: 8/18/2023    Pre-Op Diagnosis Codes:  Colostomy in place    Post-Op Diagnosis: Same       Procedure(s):  ROBOTIC COLOSTOMY REVERSAL - CYSTOSCOPY URETERAL CATHETER INSERTION  (E R A S)  . Surgeon(s):  MD Mata Bernardo MD    Assistant:  * No surgical staff found *    Anesthesia: General    Estimated Blood Loss (mL): less than 50     Complications: None    Specimens:   ID Type Source Tests Collected by Time Destination   A : colostomy Tissue Colon SURGICAL PATHOLOGY Vianey Bernstein MD 8/18/2023 5733    B : proximal ring Tissue Colon SURGICAL PATHOLOGY Vianey Bernstein MD 8/18/2023 9837    C : distal ring Tissue Colon SURGICAL PATHOLOGY Vianey Bernstein MD 8/18/2023 6082    D : proximal rectum Tissue Colon SURGICAL PATHOLOGY Vianey Bernstein MD 8/18/2023 1028        Implants:  * No implants in log *      Drains:   Urinary Catheter 08/18/23 2 Way (Active)       Findings: good reach of colon into pelvis. Brisk and bright perfusion of distal colon and rectal stump with IcG. Complete EEA rings.  Negative air leak test. Hemostatic and complete anastomosis on flexible sigmoidoscopy      Electronically signed by Vianey Bernstein MD on 8/18/2023 at 10:50 AM

## 2023-08-18 NOTE — PROGRESS NOTES
TRANSFER - IN REPORT:    Verbal report received from SHANIKA Whaley on Nickie Sandifer  being received from PACU for routine progression of patient care      Report consisted of patient's Situation, Background, Assessment and   Recommendations(SBAR). Information from the following report(s) Nurse Handoff Report, Index, ED SBAR, Intake/Output, MAR, Recent Results, and Cardiac Rhythm NSR  was reviewed with the receiving nurse. Opportunity for questions and clarification was provided. Assessment will be completed upon patient's arrival to unit and care assumed.

## 2023-08-18 NOTE — PERIOP NOTE
5068:  Patient to OR Holding. All consents reviewed with him. He verbalizes understanding of all info provided. Signature obtained for anesthesia.    8358:  Patient up to BR to cleanse with CHG, empty bladder & change into surgical gown. Mepilex sacral pad applied as a preventative measure. Skin is clean, dry & intact    0646:  Dr. Oswald Foot in to see the patient & discuss anesthesia plan of care.

## 2023-08-18 NOTE — PROGRESS NOTES
End of Shift Note    Bedside shift change report given to Marybeth Manning (oncoming nurse) by Joselyn Barahona LPN (offgoing nurse). Report included the following information SBAR, Kardex, Intake/Output, MAR, and Recent Results    Shift worked:  4p-7p     Shift summary and any significant changes:     Pt. Arrived to floor from PACU at 1600. Pt. Made comfortable. RN completed assessment on arrival. Vital signs obtained. Dual skin completed. Pt. Educated on use of incentive spirometer and pt. Verbalized understanding. Pt given scheduled meds and started on lactated ringers infusion at 75 mL per hour. Pt. Complained of pain x 1. Given dilaudid x 1. Pt. On clear liquid diet, tolerating well.       Concerns for physician to address:  none     Zone phone for oncoming shift:   [phone]         Joselyn Barahona LPN

## 2023-08-18 NOTE — ANESTHESIA POSTPROCEDURE EVALUATION
Department of Anesthesiology  Postprocedure Note    Patient: Jacquelyn Tavares  MRN: [id number]  YOB: 1965  Date of evaluation: 8/18/2023      Procedure Summary     Date: 08/18/23 Room / Location: [facility] MAIN OR M9 / [facility] MAIN OR    Anesthesia Start: 0733 Anesthesia Stop: 1110    Procedures:       ROBOTIC COLOSTOMY REVERSAL - CYSTOSCOPY URETERAL CATHETER INSERTION  (E R A S) (Abdomen)      .  (Bladder) Diagnosis:       Colostomy status (HCC)      (Colostomy status ([address]) [Z93.3])    Providers: Kath Leija MD; Low Ashley MD Responsible Provider: Brenda Treadwell MD    Anesthesia Type: General ASA Status: 2          Anesthesia Type: General    Laila Phase I: Laila Score: 9    Laila Phase II:        Anesthesia Post Evaluation    Patient location during evaluation: PACU  Patient participation: complete - patient participated  Level of consciousness: sleepy but conscious and responsive to verbal stimuli  Airway patency: patent  Nausea & Vomiting: no vomiting and no nausea  Complications: no  Cardiovascular status: blood pressure returned to baseline and hemodynamically stable  Respiratory status: acceptable  Hydration status: stable

## 2023-08-18 NOTE — OP NOTE
[facility]    Name:  Yunior Kelley  MR#:  [id number]  :  1965  ACCOUNT #:  [de-identified]  DATE OF SERVICE:  2023    PREOPERATIVE DIAGNOSIS:  Desire for intraoperative ureteral catheters. POSTOPERATIVE DIAGNOSIS:  Desire for intraoperative ureteral catheters. PROCEDURE PERFORMED:  Cystoscopy, placement of bilateral ureteral catheters. SURGEON:  HELEN Florez Jewel:  None. ANESTHESIA:  General.    COMPLICATIONS:  None. SPECIMENS REMOVED:  None. IMPLANTS:  None. ESTIMATED BLOOD LOSS:  None. DRAINS:  Two ureteral catheters and a Beebe catheter. FINDINGS:  See below. INDICATIONS:  The patient is a 81-year-old male who initially presented with a colon perforation and had a colostomy. He is scheduled for colostomy takedown by general surgery who has requested ureteral catheters by the Urology Service. The patient has no prior urologic history. OPERATION:  The patient was brought to the operating room, placed in supine position, and placed under general anesthesia by the Anesthesia Service. Preoperative antibiotics were given with 2 g of a cephalosporin. He was prepped and draped in standard fashion after being placed in [address] [place] [address]. Cystoscopy was performed using a 21-Palauan rigid cystoscope. No injury or urethral strictures noted. His prostate was 2.5 cm in length without significant visual obstruction. No middle lobe. The bladder itself was otherwise normal without evidence of trabeculation, cellules, stones, or exophytic lesions. The trigone was normal and both UO's were easily identifiable. A 5-Palauan whistle-tip catheters were placed 20-25 cm up each ureter with no resistance. The cystoscope was removed. A Beebe catheter was placed and the ureteral catheters were tied to the Beebe. Gen surgery will dictate her procedure under separate cover.       Haley Lopez M.D.      [id number]  D:
awakened, extubated, and transported to the PACU in good condition.          Electronically signed by Maurice Silveira MD on 8/18/2023 at 10:52 AM

## 2023-08-19 LAB
ANION GAP SERPL CALC-SCNC: 2 MMOL/L (ref 5–15)
BASOPHILS # BLD: 0 K/UL (ref 0–0.1)
BASOPHILS NFR BLD: 0 % (ref 0–1)
BUN SERPL-MCNC: 7 MG/DL (ref 6–20)
BUN/CREAT SERPL: 10 (ref 12–20)
CALCIUM SERPL-MCNC: 7.8 MG/DL (ref 8.5–10.1)
CHLORIDE SERPL-SCNC: 110 MMOL/L (ref 97–108)
CO2 SERPL-SCNC: 27 MMOL/L (ref 21–32)
CREAT SERPL-MCNC: 0.69 MG/DL (ref 0.7–1.3)
DIFFERENTIAL METHOD BLD: ABNORMAL
EOSINOPHIL # BLD: 0.1 K/UL (ref 0–0.4)
EOSINOPHIL NFR BLD: 1 % (ref 0–7)
ERYTHROCYTE [DISTWIDTH] IN BLOOD BY AUTOMATED COUNT: 12.1 % (ref 11.5–14.5)
GLUCOSE SERPL-MCNC: 91 MG/DL (ref 65–100)
HCT VFR BLD AUTO: 37.9 % (ref 36.6–50.3)
HGB BLD-MCNC: 12.9 G/DL (ref 12.1–17)
IMM GRANULOCYTES # BLD AUTO: 0 K/UL (ref 0–0.04)
IMM GRANULOCYTES NFR BLD AUTO: 0 % (ref 0–0.5)
LYMPHOCYTES # BLD: 1 K/UL (ref 0.8–3.5)
LYMPHOCYTES NFR BLD: 13 % (ref 12–49)
MCH RBC QN AUTO: 38.4 PG (ref 26–34)
MCHC RBC AUTO-ENTMCNC: 34 G/DL (ref 30–36.5)
MCV RBC AUTO: 112.8 FL (ref 80–99)
MONOCYTES # BLD: 0.6 K/UL (ref 0–1)
MONOCYTES NFR BLD: 8 % (ref 5–13)
NEUTS SEG # BLD: 6.1 K/UL (ref 1.8–8)
NEUTS SEG NFR BLD: 78 % (ref 32–75)
NRBC # BLD: 0 K/UL (ref 0–0.01)
NRBC BLD-RTO: 0 PER 100 WBC
PLATELET # BLD AUTO: 89 K/UL (ref 150–400)
PLATELET COMMENT: ABNORMAL
PMV BLD AUTO: 10.7 FL (ref 8.9–12.9)
POTASSIUM SERPL-SCNC: 3.7 MMOL/L (ref 3.5–5.1)
RBC # BLD AUTO: 3.36 M/UL (ref 4.1–5.7)
RBC MORPH BLD: ABNORMAL
SODIUM SERPL-SCNC: 139 MMOL/L (ref 136–145)
WBC # BLD AUTO: 7.8 K/UL (ref 4.1–11.1)

## 2023-08-19 PROCEDURE — 6360000002 HC RX W HCPCS: Performed by: STUDENT IN AN ORGANIZED HEALTH CARE EDUCATION/TRAINING PROGRAM

## 2023-08-19 PROCEDURE — 6370000000 HC RX 637 (ALT 250 FOR IP): Performed by: STUDENT IN AN ORGANIZED HEALTH CARE EDUCATION/TRAINING PROGRAM

## 2023-08-19 PROCEDURE — 80048 BASIC METABOLIC PNL TOTAL CA: CPT

## 2023-08-19 PROCEDURE — 85025 COMPLETE CBC W/AUTO DIFF WBC: CPT

## 2023-08-19 PROCEDURE — 1100000000 HC RM PRIVATE

## 2023-08-19 PROCEDURE — 2580000003 HC RX 258: Performed by: STUDENT IN AN ORGANIZED HEALTH CARE EDUCATION/TRAINING PROGRAM

## 2023-08-19 PROCEDURE — 36415 COLL VENOUS BLD VENIPUNCTURE: CPT

## 2023-08-19 RX ADMIN — ENOXAPARIN SODIUM 40 MG: 100 INJECTION SUBCUTANEOUS at 08:29

## 2023-08-19 RX ADMIN — ACETAMINOPHEN 650 MG: 325 TABLET ORAL at 14:19

## 2023-08-19 RX ADMIN — KETOROLAC TROMETHAMINE 15 MG: 30 INJECTION, SOLUTION INTRAMUSCULAR; INTRAVENOUS at 08:29

## 2023-08-19 RX ADMIN — OXYCODONE HYDROCHLORIDE 10 MG: 5 TABLET ORAL at 12:18

## 2023-08-19 RX ADMIN — ACETAMINOPHEN 650 MG: 325 TABLET ORAL at 21:06

## 2023-08-19 RX ADMIN — SODIUM CHLORIDE, PRESERVATIVE FREE 10 ML: 5 INJECTION INTRAVENOUS at 08:30

## 2023-08-19 RX ADMIN — KETOROLAC TROMETHAMINE 15 MG: 30 INJECTION, SOLUTION INTRAMUSCULAR; INTRAVENOUS at 04:07

## 2023-08-19 RX ADMIN — Medication 1 AMPULE: at 04:07

## 2023-08-19 RX ADMIN — Medication 1 AMPULE: at 14:19

## 2023-08-19 RX ADMIN — OXYCODONE HYDROCHLORIDE 10 MG: 5 TABLET ORAL at 17:14

## 2023-08-19 RX ADMIN — ACETAMINOPHEN 650 MG: 325 TABLET ORAL at 08:29

## 2023-08-19 RX ADMIN — KETOROLAC TROMETHAMINE 15 MG: 30 INJECTION, SOLUTION INTRAMUSCULAR; INTRAVENOUS at 21:06

## 2023-08-19 RX ADMIN — ACETAMINOPHEN 650 MG: 325 TABLET ORAL at 04:07

## 2023-08-19 RX ADMIN — SODIUM CHLORIDE, PRESERVATIVE FREE 10 ML: 5 INJECTION INTRAVENOUS at 21:07

## 2023-08-19 RX ADMIN — KETOROLAC TROMETHAMINE 15 MG: 30 INJECTION, SOLUTION INTRAMUSCULAR; INTRAVENOUS at 14:16

## 2023-08-19 RX ADMIN — OXYCODONE HYDROCHLORIDE 10 MG: 5 TABLET ORAL at 08:29

## 2023-08-19 ASSESSMENT — PAIN DESCRIPTION - DESCRIPTORS
DESCRIPTORS: ACHING

## 2023-08-19 ASSESSMENT — PAIN SCALES - GENERAL
PAINLEVEL_OUTOF10: 2
PAINLEVEL_OUTOF10: 2
PAINLEVEL_OUTOF10: 5
PAINLEVEL_OUTOF10: 7
PAINLEVEL_OUTOF10: 2
PAINLEVEL_OUTOF10: 7
PAINLEVEL_OUTOF10: 2
PAINLEVEL_OUTOF10: 7
PAINLEVEL_OUTOF10: 6
PAINLEVEL_OUTOF10: 9

## 2023-08-19 ASSESSMENT — PAIN DESCRIPTION - LOCATION
LOCATION: ABDOMEN

## 2023-08-19 ASSESSMENT — PAIN DESCRIPTION - ORIENTATION
ORIENTATION: RIGHT
ORIENTATION: RIGHT;LEFT
ORIENTATION: RIGHT;LEFT
ORIENTATION: RIGHT

## 2023-08-19 NOTE — PROGRESS NOTES
End of Shift Note    Bedside shift change report given to Yvonne Manning [address] (oncoming nurse) by Camila Dee RN (offgoing nurse). Report included the following information SBAR, Kardex, Intake/Output, MAR, and Recent Results    Shift worked:  8116-7160     Shift summary and any significant changes:     Wound care completed; pt medicated for pain as needed. Pt ambulating in the hallways & out of bed most of the day. Pt tolerating meals. Concerns for physician to address:  Discharge planning     Zone phone for oncoming shift:          Activity:     Number times ambulated in hallways past shift: 5  Number of times OOB to chair past shift: 5    Cardiac:   Cardiac Monitoring: No           Access:  Current line(s): PIV     Genitourinary:   Urinary status: voiding    Respiratory:      Chronic home O2 use?: N/A  Incentive spirometer at bedside: YES       GI:     Current diet:  ADULT DIET;  Clear Liquid  DIET ONE TIME MESSAGE;  Passing flatus: YES  Tolerating current diet: YES       Pain Management:   Patient states pain is manageable on current regimen: YES    Skin:     Interventions: float heels and increase time out of bed    Patient Safety:  Fall Score:    Interventions: gripper socks and pt to call before getting OOB       Length of Stay:  Expected LOS: 3  Actual LOS: 1      Camila Dee RN

## 2023-08-19 NOTE — PLAN OF CARE
Problem: Safety - Adult  Goal: Free from fall injury  8/18/2023 2201 by Emilee Ferris RN  Outcome: Progressing  8/18/2023 1839 by Gunnar Bernstein RN  Outcome: Progressing     Problem: ABCDS Injury Assessment  Goal: Absence of physical injury  Outcome: Progressing     Problem: Pain  Goal: Verbalizes/displays adequate comfort level or baseline comfort level  8/18/2023 2201 by Emilee Ferris RN  Outcome: Progressing  8/18/2023 1839 by Gunnar Bernstein, RN  Outcome: Progressing     Problem: Discharge Planning  Goal: Discharge to home or other facility with appropriate resources  Outcome: Not Progressing

## 2023-08-20 LAB
ANION GAP SERPL CALC-SCNC: 5 MMOL/L (ref 5–15)
BASOPHILS # BLD: 0 K/UL (ref 0–0.1)
BASOPHILS NFR BLD: 0 % (ref 0–1)
BUN SERPL-MCNC: 6 MG/DL (ref 6–20)
BUN/CREAT SERPL: 7 (ref 12–20)
CALCIUM SERPL-MCNC: 8.6 MG/DL (ref 8.5–10.1)
CHLORIDE SERPL-SCNC: 107 MMOL/L (ref 97–108)
CO2 SERPL-SCNC: 24 MMOL/L (ref 21–32)
CREAT SERPL-MCNC: 0.86 MG/DL (ref 0.7–1.3)
DIFFERENTIAL METHOD BLD: ABNORMAL
EOSINOPHIL # BLD: 0.1 K/UL (ref 0–0.4)
EOSINOPHIL NFR BLD: 1 % (ref 0–7)
ERYTHROCYTE [DISTWIDTH] IN BLOOD BY AUTOMATED COUNT: 12.4 % (ref 11.5–14.5)
GLUCOSE SERPL-MCNC: 122 MG/DL (ref 65–100)
HCT VFR BLD AUTO: 38.8 % (ref 36.6–50.3)
HGB BLD-MCNC: 13.2 G/DL (ref 12.1–17)
IMM GRANULOCYTES # BLD AUTO: 0.1 K/UL (ref 0–0.04)
IMM GRANULOCYTES NFR BLD AUTO: 1 % (ref 0–0.5)
LYMPHOCYTES # BLD: 0.4 K/UL (ref 0.8–3.5)
LYMPHOCYTES NFR BLD: 5 % (ref 12–49)
MCH RBC QN AUTO: 38 PG (ref 26–34)
MCHC RBC AUTO-ENTMCNC: 34 G/DL (ref 30–36.5)
MCV RBC AUTO: 111.8 FL (ref 80–99)
MONOCYTES # BLD: 0.5 K/UL (ref 0–1)
MONOCYTES NFR BLD: 6 % (ref 5–13)
NEUTS SEG # BLD: 7.8 K/UL (ref 1.8–8)
NEUTS SEG NFR BLD: 87 % (ref 32–75)
NRBC # BLD: 0 K/UL (ref 0–0.01)
NRBC BLD-RTO: 0 PER 100 WBC
PLATELET # BLD AUTO: 88 K/UL (ref 150–400)
PLATELET COMMENT: ABNORMAL
PMV BLD AUTO: 10.6 FL (ref 8.9–12.9)
POTASSIUM SERPL-SCNC: 3.5 MMOL/L (ref 3.5–5.1)
RBC # BLD AUTO: 3.47 M/UL (ref 4.1–5.7)
RBC MORPH BLD: ABNORMAL
SODIUM SERPL-SCNC: 136 MMOL/L (ref 136–145)
WBC # BLD AUTO: 8.9 K/UL (ref 4.1–11.1)

## 2023-08-20 PROCEDURE — 6370000000 HC RX 637 (ALT 250 FOR IP): Performed by: STUDENT IN AN ORGANIZED HEALTH CARE EDUCATION/TRAINING PROGRAM

## 2023-08-20 PROCEDURE — 6360000002 HC RX W HCPCS: Performed by: STUDENT IN AN ORGANIZED HEALTH CARE EDUCATION/TRAINING PROGRAM

## 2023-08-20 PROCEDURE — 80048 BASIC METABOLIC PNL TOTAL CA: CPT

## 2023-08-20 PROCEDURE — 85025 COMPLETE CBC W/AUTO DIFF WBC: CPT

## 2023-08-20 PROCEDURE — 1100000000 HC RM PRIVATE

## 2023-08-20 PROCEDURE — 36415 COLL VENOUS BLD VENIPUNCTURE: CPT

## 2023-08-20 PROCEDURE — 2580000003 HC RX 258: Performed by: STUDENT IN AN ORGANIZED HEALTH CARE EDUCATION/TRAINING PROGRAM

## 2023-08-20 RX ADMIN — ENOXAPARIN SODIUM 40 MG: 100 INJECTION SUBCUTANEOUS at 08:57

## 2023-08-20 RX ADMIN — Medication 1 AMPULE: at 16:35

## 2023-08-20 RX ADMIN — KETOROLAC TROMETHAMINE 15 MG: 30 INJECTION, SOLUTION INTRAMUSCULAR; INTRAVENOUS at 21:26

## 2023-08-20 RX ADMIN — Medication 1 AMPULE: at 03:06

## 2023-08-20 RX ADMIN — KETOROLAC TROMETHAMINE 15 MG: 30 INJECTION, SOLUTION INTRAMUSCULAR; INTRAVENOUS at 03:06

## 2023-08-20 RX ADMIN — OXYCODONE HYDROCHLORIDE 10 MG: 5 TABLET ORAL at 23:24

## 2023-08-20 RX ADMIN — SODIUM CHLORIDE, PRESERVATIVE FREE 10 ML: 5 INJECTION INTRAVENOUS at 21:34

## 2023-08-20 RX ADMIN — KETOROLAC TROMETHAMINE 15 MG: 30 INJECTION, SOLUTION INTRAMUSCULAR; INTRAVENOUS at 08:57

## 2023-08-20 RX ADMIN — ACETAMINOPHEN 650 MG: 325 TABLET ORAL at 10:40

## 2023-08-20 RX ADMIN — OXYCODONE HYDROCHLORIDE 10 MG: 5 TABLET ORAL at 08:58

## 2023-08-20 RX ADMIN — ACETAMINOPHEN 650 MG: 325 TABLET ORAL at 03:06

## 2023-08-20 RX ADMIN — SODIUM CHLORIDE, PRESERVATIVE FREE 10 ML: 5 INJECTION INTRAVENOUS at 09:06

## 2023-08-20 RX ADMIN — OXYCODONE HYDROCHLORIDE 10 MG: 5 TABLET ORAL at 13:07

## 2023-08-20 RX ADMIN — OXYCODONE HYDROCHLORIDE 10 MG: 5 TABLET ORAL at 03:06

## 2023-08-20 RX ADMIN — ACETAMINOPHEN 650 MG: 325 TABLET ORAL at 16:35

## 2023-08-20 RX ADMIN — OXYCODONE HYDROCHLORIDE 10 MG: 5 TABLET ORAL at 19:14

## 2023-08-20 RX ADMIN — ACETAMINOPHEN 650 MG: 325 TABLET ORAL at 21:26

## 2023-08-20 ASSESSMENT — PAIN SCALES - GENERAL
PAINLEVEL_OUTOF10: 8
PAINLEVEL_OUTOF10: 7
PAINLEVEL_OUTOF10: 8

## 2023-08-20 ASSESSMENT — PAIN DESCRIPTION - LOCATION
LOCATION: ABDOMEN
LOCATION: SHOULDER

## 2023-08-20 ASSESSMENT — PAIN DESCRIPTION - DESCRIPTORS
DESCRIPTORS: ACHING
DESCRIPTORS: SORE
DESCRIPTORS: PRESSURE
DESCRIPTORS: SORE

## 2023-08-20 NOTE — PROGRESS NOTES
End of Shift Note    Bedside shift change report given to SHANIKA Lisa (oncoming nurse) by Chandler Olivo LPN (offgoing nurse). Report included the following information SBAR, Kardex, Intake/Output, MAR, and Recent Results    Shift worked:  7a-7p     Shift summary and any significant changes:     Pt. Up and out of bed for most of shift. Pt. In chair for most of shift and walked halls x 3 this shift. IV found leaking this afternoon. New IV inserted. Diet advanced to regular low fiber, tolerating well. Given oxycodone for pain x 3.      Concerns for physician to address:  none     Zone phone for oncoming shift:              Chandler Olivo LPN

## 2023-08-21 VITALS
WEIGHT: 185.63 LBS | DIASTOLIC BLOOD PRESSURE: 81 MMHG | SYSTOLIC BLOOD PRESSURE: 114 MMHG | OXYGEN SATURATION: 94 % | TEMPERATURE: 98.4 F | HEART RATE: 88 BPM | HEIGHT: 68 IN | RESPIRATION RATE: 16 BRPM | BODY MASS INDEX: 28.13 KG/M2

## 2023-08-21 LAB
ANION GAP SERPL CALC-SCNC: 5 MMOL/L (ref 5–15)
APPEARANCE UR: CLEAR
BACTERIA URNS QL MICRO: ABNORMAL /HPF
BASOPHILS # BLD: 0 K/UL (ref 0–0.1)
BASOPHILS NFR BLD: 0 % (ref 0–1)
BILIRUB UR QL CFM: NEGATIVE
BUN SERPL-MCNC: 7 MG/DL (ref 6–20)
BUN/CREAT SERPL: 8 (ref 12–20)
CALCIUM SERPL-MCNC: 8.5 MG/DL (ref 8.5–10.1)
CHLORIDE SERPL-SCNC: 108 MMOL/L (ref 97–108)
CO2 SERPL-SCNC: 23 MMOL/L (ref 21–32)
COLOR UR: ABNORMAL
CREAT SERPL-MCNC: 0.92 MG/DL (ref 0.7–1.3)
DIFFERENTIAL METHOD BLD: ABNORMAL
EOSINOPHIL # BLD: 0.2 K/UL (ref 0–0.4)
EOSINOPHIL NFR BLD: 2 % (ref 0–7)
EPITH CASTS URNS QL MICRO: ABNORMAL /LPF
ERYTHROCYTE [DISTWIDTH] IN BLOOD BY AUTOMATED COUNT: 12.3 % (ref 11.5–14.5)
GLUCOSE SERPL-MCNC: 107 MG/DL (ref 65–100)
GLUCOSE UR STRIP.AUTO-MCNC: NEGATIVE MG/DL
HCT VFR BLD AUTO: 40 % (ref 36.6–50.3)
HGB BLD-MCNC: 13.5 G/DL (ref 12.1–17)
HGB UR QL STRIP: NEGATIVE
IMM GRANULOCYTES # BLD AUTO: 0.1 K/UL (ref 0–0.04)
IMM GRANULOCYTES NFR BLD AUTO: 1 % (ref 0–0.5)
KETONES UR QL STRIP.AUTO: NEGATIVE MG/DL
LEUKOCYTE ESTERASE UR QL STRIP.AUTO: ABNORMAL
LYMPHOCYTES # BLD: 0.4 K/UL (ref 0.8–3.5)
LYMPHOCYTES NFR BLD: 4 % (ref 12–49)
MCH RBC QN AUTO: 37.9 PG (ref 26–34)
MCHC RBC AUTO-ENTMCNC: 33.8 G/DL (ref 30–36.5)
MCV RBC AUTO: 112.4 FL (ref 80–99)
MONOCYTES # BLD: 0.6 K/UL (ref 0–1)
MONOCYTES NFR BLD: 7 % (ref 5–13)
NEUTS SEG # BLD: 7.9 K/UL (ref 1.8–8)
NEUTS SEG NFR BLD: 86 % (ref 32–75)
NITRITE UR QL STRIP.AUTO: NEGATIVE
NRBC # BLD: 0 K/UL (ref 0–0.01)
NRBC BLD-RTO: 0 PER 100 WBC
PH UR STRIP: 6.5 (ref 5–8)
PLATELET # BLD AUTO: 84 K/UL (ref 150–400)
PMV BLD AUTO: 10.7 FL (ref 8.9–12.9)
POTASSIUM SERPL-SCNC: 3.8 MMOL/L (ref 3.5–5.1)
PROT UR STRIP-MCNC: 100 MG/DL
RBC # BLD AUTO: 3.56 M/UL (ref 4.1–5.7)
RBC #/AREA URNS HPF: ABNORMAL /HPF (ref 0–5)
RBC MORPH BLD: ABNORMAL
SODIUM SERPL-SCNC: 136 MMOL/L (ref 136–145)
SP GR UR REFRACTOMETRY: 1.02
URINE CULTURE IF INDICATED: ABNORMAL
UROBILINOGEN UR QL STRIP.AUTO: 1 EU/DL (ref 0.2–1)
WBC # BLD AUTO: 9.2 K/UL (ref 4.1–11.1)
WBC URNS QL MICRO: ABNORMAL /HPF (ref 0–4)

## 2023-08-21 PROCEDURE — 36415 COLL VENOUS BLD VENIPUNCTURE: CPT

## 2023-08-21 PROCEDURE — 2580000003 HC RX 258: Performed by: STUDENT IN AN ORGANIZED HEALTH CARE EDUCATION/TRAINING PROGRAM

## 2023-08-21 PROCEDURE — 6370000000 HC RX 637 (ALT 250 FOR IP): Performed by: STUDENT IN AN ORGANIZED HEALTH CARE EDUCATION/TRAINING PROGRAM

## 2023-08-21 PROCEDURE — 81001 URINALYSIS AUTO W/SCOPE: CPT

## 2023-08-21 PROCEDURE — 80048 BASIC METABOLIC PNL TOTAL CA: CPT

## 2023-08-21 PROCEDURE — 6360000002 HC RX W HCPCS: Performed by: STUDENT IN AN ORGANIZED HEALTH CARE EDUCATION/TRAINING PROGRAM

## 2023-08-21 PROCEDURE — 85025 COMPLETE CBC W/AUTO DIFF WBC: CPT

## 2023-08-21 PROCEDURE — 99024 POSTOP FOLLOW-UP VISIT: CPT | Performed by: NURSE PRACTITIONER

## 2023-08-21 RX ORDER — OXYCODONE HYDROCHLORIDE 5 MG/1
5 TABLET ORAL EVERY 6 HOURS PRN
Qty: 10 TABLET | Refills: 0 | Status: SHIPPED | OUTPATIENT
Start: 2023-08-21 | End: 2023-08-24

## 2023-08-21 RX ADMIN — SODIUM CHLORIDE, PRESERVATIVE FREE 10 ML: 5 INJECTION INTRAVENOUS at 08:57

## 2023-08-21 RX ADMIN — KETOROLAC TROMETHAMINE 15 MG: 30 INJECTION, SOLUTION INTRAMUSCULAR; INTRAVENOUS at 08:56

## 2023-08-21 RX ADMIN — KETOROLAC TROMETHAMINE 15 MG: 30 INJECTION, SOLUTION INTRAMUSCULAR; INTRAVENOUS at 04:18

## 2023-08-21 RX ADMIN — Medication 1 AMPULE: at 04:19

## 2023-08-21 RX ADMIN — ACETAMINOPHEN 650 MG: 325 TABLET ORAL at 04:18

## 2023-08-21 RX ADMIN — ACETAMINOPHEN 650 MG: 325 TABLET ORAL at 08:56

## 2023-08-21 RX ADMIN — OXYCODONE HYDROCHLORIDE 10 MG: 5 TABLET ORAL at 04:18

## 2023-08-21 RX ADMIN — OXYCODONE HYDROCHLORIDE 10 MG: 5 TABLET ORAL at 08:55

## 2023-08-21 RX ADMIN — OXYCODONE HYDROCHLORIDE 10 MG: 5 TABLET ORAL at 13:06

## 2023-08-21 ASSESSMENT — PAIN DESCRIPTION - FREQUENCY
FREQUENCY: INTERMITTENT
FREQUENCY: INTERMITTENT

## 2023-08-21 ASSESSMENT — PAIN - FUNCTIONAL ASSESSMENT
PAIN_FUNCTIONAL_ASSESSMENT: PREVENTS OR INTERFERES SOME ACTIVE ACTIVITIES AND ADLS
PAIN_FUNCTIONAL_ASSESSMENT: PREVENTS OR INTERFERES SOME ACTIVE ACTIVITIES AND ADLS

## 2023-08-21 ASSESSMENT — PAIN DESCRIPTION - LOCATION
LOCATION: ABDOMEN

## 2023-08-21 ASSESSMENT — PAIN DESCRIPTION - ONSET
ONSET: GRADUAL
ONSET: GRADUAL

## 2023-08-21 ASSESSMENT — PAIN DESCRIPTION - ORIENTATION
ORIENTATION: ANTERIOR;MID
ORIENTATION: ANTERIOR;MID
ORIENTATION: ANTERIOR

## 2023-08-21 ASSESSMENT — PAIN SCALES - GENERAL
PAINLEVEL_OUTOF10: 4
PAINLEVEL_OUTOF10: 7

## 2023-08-21 ASSESSMENT — PAIN DESCRIPTION - DESCRIPTORS
DESCRIPTORS: ACHING
DESCRIPTORS: ACHING;DISCOMFORT
DESCRIPTORS: ACHING;DISCOMFORT

## 2023-08-21 ASSESSMENT — PAIN DESCRIPTION - PAIN TYPE: TYPE: ACUTE PAIN;SURGICAL PAIN

## 2023-08-21 NOTE — PROGRESS NOTES
CRS Note    Isolated fever overnight. Feels good today. Abd is sore but overall pain controlled. Having flatus and multiple BM and tolerating GIS diet without issue. Abd bloating much improved with bowel function. No n/v. He does note dysuria and strong smell to  urine. Vitals reviewed  Abd is soft, minimally distended, incisions c/d/I, ostomy site dressing dry    Labs reviewed    A/P:  65 y.o. M s/p Ellie Heaps reversal, overall doing well. Isolated fever.   - check UA given fever and sx's of dysuria and foul smelling urine  - continue diet  - nutritionist for post-surgery drinks  - continue current care  - he really wants to go home today. Discussed that I typically wait for a 24h fever-free period but given the isolated fever and how good he looks, we have agreed upon deciding on discharge later today. If no further fevers, I am ok with letting him go home. If fevers, he will stay.  If UA positive, will treat with oral abx    Goldie Orourke MD   [facility]  [phone]

## 2023-08-21 NOTE — PLAN OF CARE
Problem: Safety - Adult  Goal: Free from fall injury  8/20/2023 2302 by Bianca Arevalo RN  Outcome: Progressing  8/20/2023 1106 by Adán Booth LPN  Outcome: Progressing     Problem: ABCDS Injury Assessment  Goal: Absence of physical injury  Outcome: Progressing     Problem: Pain  Goal: Verbalizes/displays adequate comfort level or baseline comfort level  Outcome: Progressing     Problem: Discharge Planning  Goal: Discharge to home or other facility with appropriate resources  Outcome: Not Progressing

## 2023-08-21 NOTE — CARE COORDINATION
Care Management Initial Assessment       RUR: 7% \"low risk\"  Readmission? No  1st IM letter given? No  1st  letter given: No     Initial note: Chart reviewed for updates. CM met with pt and wife Dariel Pierce [phone]) at bedside, introduced role, confirmed demographics were up-to-date and discussed d/c planning. Pt lives with his spouse in a 1 level home with a ramp entrance. Pt is independent with ADL's at home and does not use any DME. Pt denied history of IPR/SNF/HH but reported history of OP rehab. Pt uses [facility]. Pt's spouse will provide transport at d/c. Pt will schedule his own PCP appointment. No further CM needs identified at this time. Complete assessment is below:     08/21/23 1256   Service Assessment   Patient Orientation Alert and Oriented   Cognition Alert   History Provided By Patient   Primary Caregiver Self   Accompanied By/Relationship Wife at [address] is: Legal Next of Kin  (Pt's wife Dariel Pierce [phone])   PCP Verified by CM Yes   Last Visit to PCP Within last year   Prior Functional Level Independent in ADLs/IADLs   Current Functional Level Independent in ADLs/IADLs   Can patient return to prior living arrangement Yes   Ability to make needs known: Good   Family able to assist with home care needs: Yes   Would you like for me to discuss the discharge plan with any other family members/significant others, and if so, who?  Yes  (Wife)   Financial Resources Other (Comment)  (BCBS)   Social/Functional History   Lives With Spouse   Type of [address] [place] [address]  One level   Home Access Ramped entrance   [address] [facility]   Ambulation Assistance Independent   Transfer Assistance Independent   Active  Yes   Mode of Transportation Car   Discharge Planning   Type of [address]

## 2023-08-21 NOTE — DISCHARGE SUMMARY
Post- Surgical Discharge Summary    Patient ID:  Jacquelyn Tavares  [id number]  male  65 y.o.  1965    Admit date: 8/18/2023    Discharge date: 08/21/23     Admitting Physician: Kath Leija MD     Date of Surgery:   8/18/2023     Preoperative Diagnosis:  Colostomy status ([facility]) [Z93.3]    Postoperative Diagnosis:   * No post-op diagnosis entered *    Procedure(s):  ROBOTIC COLOSTOMY REVERSAL - CYSTOSCOPY URETERAL CATHETER INSERTION  (E R A S)     Anesthesia Type:   General     Surgeon: Kath Leija MD                            HPI:  Pt is a 65 y.o. male who has a history of Colostomy status ([facility]) [Z93.3] who presents at this time for a colostomy reversal.    Problem List:   Patient Active Problem List   Diagnosis    Diverticulitis    Colon cancer ([facility])    Colostomy in place [facility])        Hospital Course: The patient underwent surgery. Intra-operative complications: None; patient tolerated the procedure well. Was taken to the PACU in stable condition and then transferred to the surgical floor. Perioperative Antibiotics: Cefoxitin    Postoperative Pain Management:  Oxycodone     Postoperative transfusions:    Number of units banked PRBCs =   none     Post Op complications: None     Incisions  - clean, dry and intact. No significant erythema or swelling. Wound(s) appear to be healing without any evidence of infection. Patient mobilized with nursing and was found to be safe and steady with ambulation. Discharged to: Home    Condition on Discharge: Stable     Discharge instructions:    - Take pain medications as prescribed  - Diet Low Fiber  - Discharge activity:    - Activity as tolerated    - Ambulate several times a day   - No heavy lifting for 4 weeks   - Do not drive for two weeks or while on opioid pain medications  - Wound Care: Keep wound(s) clean and dry. See discharge instruction sheet.     Allergies:  No Known Allergies           -DISCHARGE MEDICATION LIST        Medication List

## 2023-08-21 NOTE — PROGRESS NOTES
Nutrition Note    Chart reviewed; RD provided a 5-day supply of Ensure Surgery/Immunonutrition (10 bottles) to the bedside per ERAS protocol and discussed the importance of adequate nutrition/supplement compliance in effort to optimize wound healing and recovery from surgery. Pt agreeable and reports that he enjoyed the Ensure Surgery pre-op. RD anticipates good compliance.       Electronically signed by Whit Edwards RD, [facility] on 8/21/23 at 11:13 AM EDT    Contact: ext [phone]

## 2023-09-12 ENCOUNTER — HOSPITAL ENCOUNTER (OUTPATIENT)
Facility: HOSPITAL | Age: 58
Discharge: HOME | End: 2023-09-15
Attending: INTERNAL MEDICINE
Payer: COMMERCIAL

## 2023-09-12 VITALS
HEART RATE: 75 BPM | HEIGHT: 69 IN | BODY MASS INDEX: 26.66 KG/M2 | RESPIRATION RATE: 17 BRPM | SYSTOLIC BLOOD PRESSURE: 108 MMHG | OXYGEN SATURATION: 94 % | TEMPERATURE: 98.1 F | WEIGHT: 180 LBS | DIASTOLIC BLOOD PRESSURE: 59 MMHG

## 2023-09-12 DIAGNOSIS — C18.7 MALIGNANT NEOPLASM OF SIGMOID COLON (HCC): ICD-10-CM

## 2023-09-12 PROCEDURE — 71045 X-RAY EXAM CHEST 1 VIEW: CPT

## 2023-09-12 PROCEDURE — 2500000003 HC RX 250 WO HCPCS: Performed by: STUDENT IN AN ORGANIZED HEALTH CARE EDUCATION/TRAINING PROGRAM

## 2023-09-12 PROCEDURE — 99156 MOD SED OTH PHYS/QHP 5/>YRS: CPT

## 2023-09-12 PROCEDURE — 6360000002 HC RX W HCPCS: Performed by: STUDENT IN AN ORGANIZED HEALTH CARE EDUCATION/TRAINING PROGRAM

## 2023-09-12 RX ORDER — MIDAZOLAM HYDROCHLORIDE 1 MG/ML
INJECTION, SOLUTION INTRAMUSCULAR; INTRAVENOUS PRN
Status: COMPLETED | OUTPATIENT
Start: 2023-09-12 | End: 2023-09-12

## 2023-09-12 RX ORDER — FENTANYL CITRATE 50 UG/ML
INJECTION, SOLUTION INTRAMUSCULAR; INTRAVENOUS PRN
Status: COMPLETED | OUTPATIENT
Start: 2023-09-12 | End: 2023-09-12

## 2023-09-12 RX ORDER — LIDOCAINE HYDROCHLORIDE AND EPINEPHRINE 10; 10 MG/ML; UG/ML
20 INJECTION, SOLUTION INFILTRATION; PERINEURAL ONCE
Status: COMPLETED | OUTPATIENT
Start: 2023-09-12 | End: 2023-09-12

## 2023-09-12 RX ORDER — HEPARIN SODIUM 200 [USP'U]/100ML
200 INJECTION, SOLUTION INTRAVENOUS ONCE
Status: COMPLETED | OUTPATIENT
Start: 2023-09-12 | End: 2023-09-12

## 2023-09-12 RX ORDER — FENTANYL CITRATE 50 UG/ML
100 INJECTION, SOLUTION INTRAMUSCULAR; INTRAVENOUS
Status: DISCONTINUED | OUTPATIENT
Start: 2023-09-12 | End: 2023-09-16 | Stop reason: HOSPADM

## 2023-09-12 RX ORDER — MIDAZOLAM HYDROCHLORIDE 1 MG/ML
5 INJECTION, SOLUTION INTRAMUSCULAR; INTRAVENOUS
Status: DISCONTINUED | OUTPATIENT
Start: 2023-09-12 | End: 2023-09-16 | Stop reason: HOSPADM

## 2023-09-12 RX ADMIN — LIDOCAINE HYDROCHLORIDE,EPINEPHRINE BITARTRATE 10 ML: 10; .01 INJECTION, SOLUTION INFILTRATION; PERINEURAL at 10:08

## 2023-09-12 RX ADMIN — MIDAZOLAM HYDROCHLORIDE 2 MG: 1 INJECTION, SOLUTION INTRAMUSCULAR; INTRAVENOUS at 09:59

## 2023-09-12 RX ADMIN — MIDAZOLAM HYDROCHLORIDE 1 MG: 1 INJECTION, SOLUTION INTRAMUSCULAR; INTRAVENOUS at 09:55

## 2023-09-12 RX ADMIN — FENTANYL CITRATE 25 MCG: 50 INJECTION, SOLUTION INTRAMUSCULAR; INTRAVENOUS at 10:00

## 2023-09-12 RX ADMIN — FENTANYL CITRATE 50 MCG: 50 INJECTION, SOLUTION INTRAMUSCULAR; INTRAVENOUS at 09:57

## 2023-09-12 RX ADMIN — HEPARIN SODIUM 80 ML: 200 INJECTION, SOLUTION INTRAVENOUS at 10:08

## 2023-09-12 ASSESSMENT — PAIN - FUNCTIONAL ASSESSMENT: PAIN_FUNCTIONAL_ASSESSMENT: NONE - DENIES PAIN

## 2023-09-12 NOTE — H&P
AAOX3      ASSESSMENT / PLAN   Procedure to be performed:  Port removal  Plan for sedation:  Moderate sedation  Mallampati Airway Assessment:  II (soft palate, uvula, fauces visible)  ASA Classification:  ASA 2 - Patient with mild systemic disease with no functional limitations  Post procedure plan:  observation per protocol  Informed consent:  indication, risks and alternatives of the planned procedure and sedation methods reviewed with the patient / family who agree to proceed  Code status:  Full      ERINN Riley NP  [facility]

## 2023-09-12 NOTE — DISCHARGE INSTRUCTIONS
[facility]  Special Procedures/Angiography Department    Radiologist:  Vipin Murillo MD     Date:    9/12/2023    Portacath Removal Discharge Instructions      Watch for signs of infection:  redness, fever, chills, increased pain, and/or drainage from the site. If this occurs, call your physician at once. Keep your dressing clean and dry. Leave the dressing in place for the next  three days    Resume your previous diet and follow medication reconciliation form. Do not lift anything heavier than 5 pounds with the affected arm for the next week. You may take Tylenol, as directed on the label, for pain. Avoid ibuprofen (Advil, Motrin) and aspirin as they may cause you to bleed. Because you received sedation, you are not to drive or sign any legal documents for the next 24 hours.       If you have any questions or concerns, please call [phone] and ask for the nurse on-call

## 2023-09-12 NOTE — PROGRESS NOTES
0840: pt arrived to xray recovery ambulatory. Aox4, vss.     0930: preprocedure xray complete. Procedure being done bedside with angio techs. 1010: Name of Procedure: port removal     Sedation medications given: yes     Versed: 3 mg     Fentanyl:  75 mcg     Sedation Tolerated: well     Procedure and sedation times are the same. Sedation Start: 0955  Sedation End: 1006     Vital Signs:  stable    Any complications related to procedure: none identified at this time     Patient is A&Ox4, on RA, and is in NAD at this time. This patient is at an increased risk of falling because they have received sedating medications. Please evaluate and implement fall precautions/fall prevention practices as appropriate. 1040: pt eating crackers and having pepsi. No complaints of pain, tolerating well. *Upon original chest xray, incidental finding of free air in his abdomen NP Edwin Meyers asked to call surgeons office for further advice*    1050: post procedural chest xray completed. 1125: instructed byb radiologist that pt is fine to go home. IV removed, wife directed to pull car to discharge. Discharge instructions gone over and signed. 1135: pt discharged with wife driving.

## 2023-10-05 DIAGNOSIS — C18.7 MALIGNANT NEOPLASM OF SIGMOID COLON (HCC): ICD-10-CM

## 2024-01-02 NOTE — PROGRESS NOTES
Arnoldo Hubbard is a 57 y.o. male here for follow up for colon carcinoma.  Port removed 9/12/23.   Pt still reporting some neuropathy in feet.   Will be setting up his blood work to be done.   Has had follow ups with dermatologist and urologist.     1. Have you been to the ER, urgent care clinic since your last visit?  Hospitalized since your last visit?   8/18/23 colostomy reversal    2. Have you seen or consulted any other health care providers outside of the [facility] since your last visit?  Include any pap smears or colon screening. no

## 2024-01-05 ENCOUNTER — OFFICE VISIT (OUTPATIENT)
Age: 59
End: 2024-01-05
Payer: COMMERCIAL

## 2024-01-05 VITALS
SYSTOLIC BLOOD PRESSURE: 145 MMHG | WEIGHT: 200 LBS | OXYGEN SATURATION: 98 % | HEART RATE: 77 BPM | DIASTOLIC BLOOD PRESSURE: 83 MMHG | HEIGHT: 69 IN | BODY MASS INDEX: 29.62 KG/M2 | TEMPERATURE: 98.3 F

## 2024-01-05 DIAGNOSIS — C18.7 MALIGNANT NEOPLASM OF SIGMOID COLON (HCC): Primary | ICD-10-CM

## 2024-01-05 PROCEDURE — 99213 OFFICE O/P EST LOW 20 MIN: CPT | Performed by: INTERNAL MEDICINE

## 2024-01-05 RX ORDER — APREMILAST 30 MG/1
1 TABLET, FILM COATED ORAL 2 TIMES DAILY
COMMUNITY
Start: 2019-11-11

## 2024-01-05 NOTE — PROGRESS NOTES
[facility]   at [facility]   [address], [address], [address]   [place] [address]   [phone]      Oncology Note        Patient: Arnoldo Hubbard  MRN: [id number]   SSN: [id number]    YOB: 1965                Diagnosis:       1. Colon adenocarcinoma, sigmoid and moderately differentiated: Dx: 12/21/22    T4 N0 M0 (Stage IIC)           Treatment:         1. Adjuvant chemotherapy    CapOx - 4 cycles   Cape 2 cycles               (Dose reduce to 1500 mg bid cycle 3)      Subjective:     Arnoldo Hubbard is a 57 y.o. male who I am seeing for a new diagnosis of colon carcinoma. He was noted to have diverticulitis which did not respond to antibiotics treatment. He underwent a left hemicolectomy. The pathology reveals colon adenocarcinoma. 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       Interval History:      He has now completed adjuvant chemotherapy. He is doing better.          Review of Systems:      Constitutional: negative   Eyes: negative   Ears, Nose, Mouth, Throat, and Face: negative   Respiratory: negative   Cardiovascular: negative   Gastrointestinal: negative   Genitourinary:negative   Integument/Breast: negative   Hematologic/Lymphatic: negative   Musculoskeletal:negative   Neurological: negative       Review of systems was reviewed and updated as needed on 01/05/24.        Past Medical History:   Diagnosis Date    Cancer (HCC) 2023    colon-had chemo and surgery    Hypercholesterolemia     Psoriasis        Past Surgical History:   Procedure Laterality Date    ANTERIOR CERVICAL DISCECTOMY W/ FUSION      CATARACT EXTRACTION Bilateral     COLONOSCOPY N/A 8/17/2023    COLONOSCOPY performed by Evangelina Lopez MD at [facility] ENDOSCOPY    COLOSTOMY CLOSURE N/A 8/18/2023    ROBOTIC COLOSTOMY REVERSAL - CYSTOSCOPY URETERAL CATHETER INSERTION  (E R A S) performed by Evangelina Lopez MD at [facility] MAIN OR    IR PORT PLACEMENT EQUAL OR

## 2024-01-19 DIAGNOSIS — C18.7 MALIGNANT NEOPLASM OF SIGMOID COLON (HCC): ICD-10-CM

## 2024-02-05 ENCOUNTER — HOSPITAL ENCOUNTER (OUTPATIENT)
Facility: HOSPITAL | Age: 59
Discharge: HOME | End: 2024-02-08
Attending: INTERNAL MEDICINE
Payer: COMMERCIAL

## 2024-02-05 DIAGNOSIS — C18.7 MALIGNANT NEOPLASM OF SIGMOID COLON (HCC): ICD-10-CM

## 2024-02-05 PROCEDURE — 74177 CT ABD & PELVIS W/CONTRAST: CPT

## 2024-02-05 PROCEDURE — 2500000003 HC RX 250 WO HCPCS: Performed by: INTERNAL MEDICINE

## 2024-02-05 PROCEDURE — 6360000004 HC RX CONTRAST MEDICATION: Performed by: INTERNAL MEDICINE

## 2024-02-05 RX ADMIN — BARIUM SULFATE 900 ML: 20 SUSPENSION ORAL at 12:48

## 2024-02-05 RX ADMIN — IOPAMIDOL 100 ML: 755 INJECTION, SOLUTION INTRAVENOUS at 12:41

## 2024-02-20 NOTE — PROGRESS NOTES
"Demarco Gudino is a 83 y.o. male on day 1 of admission presenting with Weakness.    Subjective   Pending PTOT evaluation, pt would prefer a SNF referral to be sent to [facility] for review and acceptance.        Objective     Physical Exam    Last Recorded Vitals  Blood pressure (!) 127/96, pulse 73, temperature 36.8 °C (98.2 °F), temperature source Oral, resp. rate 18, height 1.88 m (6' 2\"), weight 88.6 kg (195 lb 5.2 oz), SpO2 96 %.  Intake/Output last 3 Shifts:  I/O last 3 completed shifts:  In: 2006.7 (22.6 mL/kg) [I.V.:956.7 (10.8 mL/kg); IV Piggyback:1050]  Out: 831 (9.4 mL/kg) [Urine:831 (0.3 mL/kg/hr)]  Weight: 88.6 kg     Relevant Results                             Assessment/Plan   Principal Problem:    Weakness               JAYLENE Jones      " Spiritual Care Assessment/Progress Note  [facility]      NAME: Sangita Louis      MRN: [id number]  AGE: 65 y.o. SEX: male  Sikhism Affiliation: Oriental orthodox   Language: English     12/14/2022     Total Time (in minutes): 1     Spiritual Assessment begun in [place] [facility] through conversation with:         [x]Patient        [] Family    [] Friend(s)        Reason for Consult: Initial/Spiritual assessment, patient floor     Spiritual beliefs: (Please include comment if needed)     [] Identifies with a beltran tradition:         [] Supported by a beltran community:            [] Claims no spiritual orientation:           [] Seeking spiritual identity:                [] Adheres to an individual form of spirituality:           [x] Not able to assess:                           Identified resources for coping:      [] Prayer                               [] Music                  [] Guided Imagery     [x] Family/friends                 [] Pet visits     [] Devotional reading                         [] Unknown     [] Other:                                               Interventions offered during this visit: (See comments for more details)    Patient Interventions: Coping skills reviewed/reinforced (SA)           Plan of Care:     [x] Support spiritual and/or cultural needs    [] Support AMD and/or advance care planning process      [] Support grieving process   [] Coordinate Rites and/or Rituals    [] Coordination with community clergy   [] No spiritual needs identified at this time   [] Detailed Plan of Care below (See Comments)  [] Make referral to Music Therapy  [] Make referral to Pet Therapy     [] Make referral to Addiction services  [] Make referral to [facility]  [] Make referral to Spiritual Care Partner  [] No future visits requested        [x] Contact Spiritual Care for further referrals     Comments: Initial Assessment with patient on Unit. Patient about to leave the room.  Patient shared that he does have family support. Advised of  availability.        Intern Angel Hassan

## 2024-04-19 ENCOUNTER — TELEPHONE (OUTPATIENT)
Age: 59
End: 2024-04-19

## 2024-04-19 DIAGNOSIS — C18.7 MALIGNANT NEOPLASM OF SIGMOID COLON (HCC): Primary | ICD-10-CM

## 2024-04-19 NOTE — TELEPHONE ENCOUNTER
Per  pt MRD is elevated from 2/2024 reading to 4/2024 reading. Shall repeat another scan now and see in office after.      Called pt.  HIPAA verified by two patient identifiers.   Relayed above information.  He will call us when the scan is scheduled and we should see him approx 2 days after.    TON FROM DR BECERRA CT ABD PELV W CONTRAST STAT

## 2024-05-01 ENCOUNTER — HOSPITAL ENCOUNTER (OUTPATIENT)
Facility: HOSPITAL | Age: 59
Discharge: HOME | End: 2024-05-04
Attending: INTERNAL MEDICINE
Payer: COMMERCIAL

## 2024-05-01 DIAGNOSIS — C18.7 MALIGNANT NEOPLASM OF SIGMOID COLON (HCC): ICD-10-CM

## 2024-05-01 PROCEDURE — 74177 CT ABD & PELVIS W/CONTRAST: CPT

## 2024-05-01 PROCEDURE — 6360000004 HC RX CONTRAST MEDICATION: Performed by: INTERNAL MEDICINE

## 2024-05-01 RX ADMIN — IOPAMIDOL 100 ML: 755 INJECTION, SOLUTION INTRAVENOUS at 17:24

## 2024-05-09 ENCOUNTER — OFFICE VISIT (OUTPATIENT)
Age: 59
End: 2024-05-09
Payer: COMMERCIAL

## 2024-05-09 VITALS
DIASTOLIC BLOOD PRESSURE: 95 MMHG | TEMPERATURE: 98.1 F | SYSTOLIC BLOOD PRESSURE: 159 MMHG | HEIGHT: 69 IN | OXYGEN SATURATION: 95 % | BODY MASS INDEX: 31.96 KG/M2 | HEART RATE: 72 BPM | WEIGHT: 215.8 LBS

## 2024-05-09 DIAGNOSIS — C18.7 MALIGNANT NEOPLASM OF SIGMOID COLON (HCC): Primary | ICD-10-CM

## 2024-05-09 PROCEDURE — 99214 OFFICE O/P EST MOD 30 MIN: CPT | Performed by: INTERNAL MEDICINE

## 2024-05-09 NOTE — PROGRESS NOTES
Arnoldo Hubbard is a 57 y.o. male here for 6 month follow up for colon carcinoma.  Pt doing well. No concerns brought up.     1. Have you been to the ER, urgent care clinic since your last visit?  Hospitalized since your last visit?    2. Have you seen or consulted any other health care providers outside of the [facility] since your last visit?  Include any pap smears or colon screening. Dermatologist, Rheumatologist   
05/09/24.         Lab Results   Component Value Date    WBC 9.2 08/21/2023    HGB 13.5 08/21/2023    HCT 40.0 08/21/2023    .4 (H) 08/21/2023    PLT 84 (L) 08/21/2023         Lab Results   Component Value Date     08/21/2023    K 3.8 08/21/2023     08/21/2023    CO2 23 08/21/2023    BUN 7 08/21/2023    CREATININE 0.92 08/21/2023    GLUCOSE 107 (H) 08/21/2023    CALCIUM 8.5 08/21/2023    BILITOT 0.6 08/10/2023    ALKPHOS 136 (H) 08/10/2023    AST 21 08/10/2023    ALT 30 08/10/2023    LABGLOM >60 08/21/2023    AGRATIO 0.9 (L) 05/01/2023    GLOB 3.9 08/10/2023            Assessment:        1. Colon adenocarcinoma, sigmoid and moderately differentiated: Dx: 12/21/2022    T4 N0 M0 (Stage IIC)          ECOG PS 0   Intent of treatment - curative   Prognosis: Good      S/P left hemicolectomy 12/21/2022   Perforated colon cancer      Signatera 2/16/2023 - 0.41 Positive   Signatera repeat - negative      Due to perforated colon cancer and T4 disease, the risk of recurrence is significant. I recommend adjuvant chemotherapy. Due to work constraints, I recommend adjuvant chemotherapy with CapOx for at least 3 months.       Adjuvant chemotherapy    CapOx - 4 cycles               Cap 2 cycles    Continue surveillance  CT in 05/01/2024 - no evidence of disease  Signatera MRD - +ve now.   I recommend PET CT         Plan:       PET CT  Return in 4-5 weeks       Signed by: Jasson Mathis MD                     May 9, 2024          CC. Evangelina Lopez MD

## 2024-05-29 NOTE — PROGRESS NOTES
Arnoldo Hubbard is a 57 y.o. male here for follow up for colon carcinoma.  PET scan done 6/3/24.   Pt doing well. No concerns brought up.     1. Have you been to the ER, urgent care clinic since your last visit?  Hospitalized since your last visit? no    2. Have you seen or consulted any other health care providers outside of the [facility] since your last visit?  Include any pap smears or colon screening.  no

## 2024-06-03 ENCOUNTER — HOSPITAL ENCOUNTER (OUTPATIENT)
Facility: HOSPITAL | Age: 59
Discharge: HOME | End: 2024-06-06
Payer: COMMERCIAL

## 2024-06-03 VITALS — BODY MASS INDEX: 31.01 KG/M2 | WEIGHT: 210 LBS

## 2024-06-03 DIAGNOSIS — C18.7 MALIGNANT NEOPLASM OF SIGMOID COLON (HCC): ICD-10-CM

## 2024-06-03 LAB
GLUCOSE BLD STRIP.AUTO-MCNC: 92 MG/DL (ref 65–117)
SERVICE CMNT-IMP: NORMAL

## 2024-06-03 PROCEDURE — A9609 HC RX DIAGNOSTIC RADIOPHARMACEUTICAL: HCPCS | Performed by: NURSE PRACTITIONER

## 2024-06-03 PROCEDURE — 3430000000 HC RX DIAGNOSTIC RADIOPHARMACEUTICAL: Performed by: NURSE PRACTITIONER

## 2024-06-03 PROCEDURE — 78815 PET IMAGE W/CT SKULL-THIGH: CPT

## 2024-06-03 PROCEDURE — 82962 GLUCOSE BLOOD TEST: CPT

## 2024-06-03 RX ORDER — FLUDEOXYGLUCOSE F-18 500 MCI/ML
10 INJECTION INTRAVENOUS
Status: COMPLETED | OUTPATIENT
Start: 2024-06-03 | End: 2024-06-03

## 2024-06-03 RX ADMIN — FLUDEOXYGLUCOSE F-18 10 MILLICURIE: 500 INJECTION INTRAVENOUS at 14:19

## 2024-06-05 ENCOUNTER — OFFICE VISIT (OUTPATIENT)
Age: 59
End: 2024-06-05
Payer: COMMERCIAL

## 2024-06-05 ENCOUNTER — TELEPHONE (OUTPATIENT)
Age: 59
End: 2024-06-05

## 2024-06-05 VITALS
DIASTOLIC BLOOD PRESSURE: 91 MMHG | BODY MASS INDEX: 31.76 KG/M2 | OXYGEN SATURATION: 95 % | WEIGHT: 214.4 LBS | HEIGHT: 69 IN | HEART RATE: 83 BPM | SYSTOLIC BLOOD PRESSURE: 160 MMHG | TEMPERATURE: 98.2 F

## 2024-06-05 DIAGNOSIS — C78.7 RECTAL CANCER METASTASIZED TO LIVER (HCC): Primary | ICD-10-CM

## 2024-06-05 DIAGNOSIS — C20 RECTAL CANCER METASTASIZED TO LIVER (HCC): Primary | ICD-10-CM

## 2024-06-05 PROCEDURE — 99215 OFFICE O/P EST HI 40 MIN: CPT | Performed by: INTERNAL MEDICINE

## 2024-06-05 NOTE — TELEPHONE ENCOUNTER
Pt had an 9:45 appt to go over results pt stated he can't see the results of his scans and would like to know if you can see his results on our end plus he did not want to make an unnecessary trip he lives 1 hour away if the results are not in please call pt before his visit

## 2024-06-05 NOTE — PROGRESS NOTES
[facility] [place]   at [facility]   [address], [address], [address]   [place] [address]   [phone]      Oncology Note        Patient: Arnoldo Hubbard  MRN: [id number]   SSN: [id number]    YOB: 1965                Diagnosis:       1. Colon adenocarcinoma, sigmoid and moderately differentiated: Dx: 12/21/22    T4 N0 M0 (Stage IIC)           Treatment:         1. Adjuvant chemotherapy    CapOx - 4 cycles   Cape 2 cycles               (Dose reduce to 1500 mg bid cycle 3)      Subjective:     Arnoldo Hubbard is a 57 y.o. male who I am seeing for a new diagnosis of colon carcinoma. He was noted to have diverticulitis which did not respond to antibiotics treatment. He underwent a left hemicolectomy. The pathology reveals colon adenocarcinoma. 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     Interval History:      He received adjuvant chemotherapy. He is doing well. MRD test is positive. PET shows a solitary disease is in the liver.          Review of Systems:      Constitutional: negative   Eyes: negative   Ears, Nose, Mouth, Throat, and Face: negative   Respiratory: negative   Cardiovascular: negative   Gastrointestinal: negative   Genitourinary:negative   Integument/Breast: negative   Hematologic/Lymphatic: negative   Musculoskeletal:negative   Neurological: negative       Review of systems was reviewed and updated as needed on 06/05/24.        Past Medical History:   Diagnosis Date    Cancer (HCC) 2023    colon-had chemo and surgery    Hypercholesterolemia     Psoriasis        Past Surgical History:   Procedure Laterality Date    ANTERIOR CERVICAL DISCECTOMY W/ FUSION      CATARACT EXTRACTION Bilateral     COLONOSCOPY N/A 8/17/2023    COLONOSCOPY performed by Evangelina Lopez MD at [facility] ENDOSCOPY    COLOSTOMY CLOSURE N/A 8/18/2023    ROBOTIC COLOSTOMY REVERSAL - CYSTOSCOPY URETERAL CATHETER INSERTION  (E R A S) performed by Evangelina

## 2024-06-07 ENCOUNTER — TELEPHONE (OUTPATIENT)
Age: 59
End: 2024-06-07

## 2024-06-07 DIAGNOSIS — C78.7 RECTAL CANCER METASTASIZED TO LIVER (HCC): Primary | ICD-10-CM

## 2024-06-07 DIAGNOSIS — C20 RECTAL CANCER METASTASIZED TO LIVER (HCC): Primary | ICD-10-CM

## 2024-06-07 NOTE — TELEPHONE ENCOUNTER
Case d/w .  Pt needs a US of the liver prior to moving forward with ablation etc.  VORB FROM DR GUIDO US LIVER STAT    Called pt.  HIPAA verified by two patient identifiers.   Pt aware and he will call scheduling now.

## 2024-06-11 ENCOUNTER — HOSPITAL ENCOUNTER (OUTPATIENT)
Age: 59
Discharge: HOME | End: 2024-06-14
Payer: COMMERCIAL

## 2024-06-11 DIAGNOSIS — C20 RECTAL CANCER METASTASIZED TO LIVER (HCC): ICD-10-CM

## 2024-06-11 DIAGNOSIS — C78.7 RECTAL CANCER METASTASIZED TO LIVER (HCC): ICD-10-CM

## 2024-06-11 PROCEDURE — 76705 ECHO EXAM OF ABDOMEN: CPT

## 2024-06-12 ENCOUNTER — TELEPHONE (OUTPATIENT)
Age: 59
End: 2024-06-12

## 2024-06-12 DIAGNOSIS — C20 RECTAL CANCER METASTASIZED TO LIVER (HCC): Primary | ICD-10-CM

## 2024-06-12 DIAGNOSIS — C78.7 RECTAL CANCER METASTASIZED TO LIVER (HCC): Primary | ICD-10-CM

## 2024-07-02 ENCOUNTER — TELEPHONE (OUTPATIENT)
Age: 59
End: 2024-07-02

## 2024-07-02 NOTE — TELEPHONE ENCOUNTER
Pt stated he is having surgery on 07/12/24 wants to know if the doctor wants to due the test before or after surgery

## 2024-07-02 NOTE — TELEPHONE ENCOUNTER
Booker due 1 month after ablation.    I will send a message to booker to schedule lab 1 month after ablation. As they come out to his home.  I asked he call us when this is obtained so we can schedule office appointment for 2 weeks after.  Pt verbalized understanding.

## 2024-07-05 NOTE — PROGRESS NOTES
Phone assessment completed. Instructions deferred to CT department. Patient verbalizes understanding.

## 2024-07-12 ENCOUNTER — ANESTHESIA (OUTPATIENT)
Facility: HOSPITAL | Age: 59
End: 2024-07-12
Payer: COMMERCIAL

## 2024-07-12 ENCOUNTER — HOSPITAL ENCOUNTER (OUTPATIENT)
Facility: HOSPITAL | Age: 59
End: 2024-07-12
Attending: INTERNAL MEDICINE
Payer: COMMERCIAL

## 2024-07-12 ENCOUNTER — ANESTHESIA EVENT (OUTPATIENT)
Facility: HOSPITAL | Age: 59
End: 2024-07-12
Payer: COMMERCIAL

## 2024-07-12 VITALS
HEART RATE: 76 BPM | RESPIRATION RATE: 16 BRPM | WEIGHT: 209.44 LBS | SYSTOLIC BLOOD PRESSURE: 154 MMHG | BODY MASS INDEX: 31.74 KG/M2 | HEIGHT: 68 IN | TEMPERATURE: 98 F | OXYGEN SATURATION: 96 % | DIASTOLIC BLOOD PRESSURE: 65 MMHG

## 2024-07-12 VITALS
RESPIRATION RATE: 18 BRPM | OXYGEN SATURATION: 98 % | DIASTOLIC BLOOD PRESSURE: 90 MMHG | HEART RATE: 78 BPM | SYSTOLIC BLOOD PRESSURE: 138 MMHG

## 2024-07-12 DIAGNOSIS — C20 RECTAL CANCER METASTASIZED TO LIVER (HCC): ICD-10-CM

## 2024-07-12 DIAGNOSIS — G89.18 POST-OP PAIN: Primary | ICD-10-CM

## 2024-07-12 DIAGNOSIS — C78.7 RECTAL CANCER METASTASIZED TO LIVER (HCC): ICD-10-CM

## 2024-07-12 PROCEDURE — 76942 ECHO GUIDE FOR BIOPSY: CPT

## 2024-07-12 PROCEDURE — 88341 IMHCHEM/IMCYTCHM EA ADD ANTB: CPT

## 2024-07-12 PROCEDURE — 88307 TISSUE EXAM BY PATHOLOGIST: CPT

## 2024-07-12 PROCEDURE — 3700000001 HC ADD 15 MINUTES (ANESTHESIA)

## 2024-07-12 PROCEDURE — 2500000003 HC RX 250 WO HCPCS: Performed by: NURSE ANESTHETIST, CERTIFIED REGISTERED

## 2024-07-12 PROCEDURE — 6360000002 HC RX W HCPCS: Performed by: NURSE ANESTHETIST, CERTIFIED REGISTERED

## 2024-07-12 PROCEDURE — 99152 MOD SED SAME PHYS/QHP 5/>YRS: CPT

## 2024-07-12 PROCEDURE — 7100000000 HC PACU RECOVERY - FIRST 15 MIN

## 2024-07-12 PROCEDURE — 6360000002 HC RX W HCPCS: Performed by: ANESTHESIOLOGY

## 2024-07-12 PROCEDURE — 3700000000 HC ANESTHESIA ATTENDED CARE

## 2024-07-12 PROCEDURE — 88333 PATH CONSLTJ SURG CYTO XM 1: CPT

## 2024-07-12 PROCEDURE — 71045 X-RAY EXAM CHEST 1 VIEW: CPT

## 2024-07-12 PROCEDURE — 6370000000 HC RX 637 (ALT 250 FOR IP): Performed by: ANESTHESIOLOGY

## 2024-07-12 PROCEDURE — 7100000001 HC PACU RECOVERY - ADDTL 15 MIN

## 2024-07-12 PROCEDURE — 88342 IMHCHEM/IMCYTCHM 1ST ANTB: CPT

## 2024-07-12 PROCEDURE — 2580000003 HC RX 258: Performed by: NURSE ANESTHETIST, CERTIFIED REGISTERED

## 2024-07-12 RX ORDER — DEXMEDETOMIDINE HYDROCHLORIDE 100 UG/ML
INJECTION, SOLUTION INTRAVENOUS PRN
Status: DISCONTINUED | OUTPATIENT
Start: 2024-07-12 | End: 2024-07-12 | Stop reason: SDUPTHER

## 2024-07-12 RX ORDER — DEXAMETHASONE SODIUM PHOSPHATE 4 MG/ML
INJECTION, SOLUTION INTRA-ARTICULAR; INTRALESIONAL; INTRAMUSCULAR; INTRAVENOUS; SOFT TISSUE PRN
Status: DISCONTINUED | OUTPATIENT
Start: 2024-07-12 | End: 2024-07-12 | Stop reason: SDUPTHER

## 2024-07-12 RX ORDER — OXYCODONE HYDROCHLORIDE 5 MG/1
5 TABLET ORAL ONCE
Status: COMPLETED | OUTPATIENT
Start: 2024-07-12 | End: 2024-07-12

## 2024-07-12 RX ORDER — ONDANSETRON 2 MG/ML
INJECTION INTRAMUSCULAR; INTRAVENOUS PRN
Status: DISCONTINUED | OUTPATIENT
Start: 2024-07-12 | End: 2024-07-12 | Stop reason: SDUPTHER

## 2024-07-12 RX ORDER — OXYCODONE HYDROCHLORIDE AND ACETAMINOPHEN 5; 325 MG/1; MG/1
1 TABLET ORAL EVERY 4 HOURS PRN
Qty: 20 TABLET | Refills: 0 | Status: SHIPPED | OUTPATIENT
Start: 2024-07-12 | End: 2024-07-17

## 2024-07-12 RX ORDER — SODIUM CHLORIDE 9 MG/ML
INJECTION, SOLUTION INTRAVENOUS CONTINUOUS PRN
Status: DISCONTINUED | OUTPATIENT
Start: 2024-07-12 | End: 2024-07-12 | Stop reason: SDUPTHER

## 2024-07-12 RX ORDER — ONDANSETRON 2 MG/ML
4 INJECTION INTRAMUSCULAR; INTRAVENOUS ONCE
Status: COMPLETED | OUTPATIENT
Start: 2024-07-12 | End: 2024-07-12

## 2024-07-12 RX ORDER — CEFAZOLIN SODIUM/WATER 2 G/20 ML
SYRINGE (ML) INTRAVENOUS PRN
Status: DISCONTINUED | OUTPATIENT
Start: 2024-07-12 | End: 2024-07-12 | Stop reason: SDUPTHER

## 2024-07-12 RX ORDER — FENTANYL CITRATE 50 UG/ML
INJECTION, SOLUTION INTRAMUSCULAR; INTRAVENOUS PRN
Status: DISCONTINUED | OUTPATIENT
Start: 2024-07-12 | End: 2024-07-12 | Stop reason: SDUPTHER

## 2024-07-12 RX ORDER — ROCURONIUM BROMIDE 10 MG/ML
INJECTION, SOLUTION INTRAVENOUS PRN
Status: DISCONTINUED | OUTPATIENT
Start: 2024-07-12 | End: 2024-07-12 | Stop reason: SDUPTHER

## 2024-07-12 RX ADMIN — FENTANYL CITRATE 50 MCG: 50 INJECTION, SOLUTION INTRAMUSCULAR; INTRAVENOUS at 08:10

## 2024-07-12 RX ADMIN — FENTANYL CITRATE 50 MCG: 50 INJECTION, SOLUTION INTRAMUSCULAR; INTRAVENOUS at 08:16

## 2024-07-12 RX ADMIN — PROPOFOL 50 MG: 10 INJECTION, EMULSION INTRAVENOUS at 08:11

## 2024-07-12 RX ADMIN — ROCURONIUM BROMIDE 15 MG: 10 INJECTION INTRAVENOUS at 08:39

## 2024-07-12 RX ADMIN — ROCURONIUM BROMIDE 20 MG: 10 INJECTION INTRAVENOUS at 08:45

## 2024-07-12 RX ADMIN — SODIUM CHLORIDE: 900 INJECTION, SOLUTION INTRAVENOUS at 08:03

## 2024-07-12 RX ADMIN — PROPOFOL 150 MG: 10 INJECTION, EMULSION INTRAVENOUS at 08:10

## 2024-07-12 RX ADMIN — OXYCODONE 5 MG: 5 TABLET ORAL at 10:01

## 2024-07-12 RX ADMIN — ROCURONIUM BROMIDE 35 MG: 10 INJECTION INTRAVENOUS at 08:11

## 2024-07-12 RX ADMIN — SUGAMMADEX 200 MG: 100 INJECTION, SOLUTION INTRAVENOUS at 09:30

## 2024-07-12 RX ADMIN — ONDANSETRON 4 MG: 2 INJECTION INTRAMUSCULAR; INTRAVENOUS at 09:55

## 2024-07-12 RX ADMIN — Medication 2000 MG: at 08:21

## 2024-07-12 RX ADMIN — ONDANSETRON HYDROCHLORIDE 4 MG: 2 INJECTION, SOLUTION INTRAMUSCULAR; INTRAVENOUS at 09:30

## 2024-07-12 RX ADMIN — LIDOCAINE HYDROCHLORIDE 100 MG: 20 INJECTION, SOLUTION EPIDURAL; INFILTRATION; INTRACAUDAL; PERINEURAL at 08:10

## 2024-07-12 RX ADMIN — DEXAMETHASONE SODIUM PHOSPHATE 4 MG: 4 INJECTION, SOLUTION INTRA-ARTICULAR; INTRALESIONAL; INTRAMUSCULAR; INTRAVENOUS; SOFT TISSUE at 08:21

## 2024-07-12 RX ADMIN — DEXMEDETOMIDINE 10 MCG: 100 INJECTION, SOLUTION INTRAVENOUS at 08:16

## 2024-07-12 RX ADMIN — FENTANYL CITRATE 50 MCG: 50 INJECTION, SOLUTION INTRAMUSCULAR; INTRAVENOUS at 08:45

## 2024-07-12 ASSESSMENT — PAIN SCALES - GENERAL
PAINLEVEL_OUTOF10: 0
PAINLEVEL_OUTOF10: 0

## 2024-07-12 NOTE — DISCHARGE INSTRUCTIONS
[facility] Interventional Radiology Dept.   [phone]    Provider: Kami Stoll MD     Learning About Tumor Ablation  What is tumor ablation?    These are two ways to treat certain types of tumors without using surgery. These treatments may be used alone or together. They may also be used with other treatments. They may be an option when surgery is not possible or is too risky. These options include tumor embolization and tumor ablation.   You are having a tumor ablation with cryotherapy.  Tumor ablation is a way to destroy tumors. It may be done using:  Heat. Radio waves may be used to burn the tumor. This is called radiofrequency ablation. Other ways to apply heat include using microwaves, lasers, or ultrasound.  Cold. A very cold gas is used to freeze the tumor. This is called cryotherapy or cryoablation.  Chemicals. A chemical is injected into the tumor. This is called chemical ablation. Alcohol (ethanol) is often used.  Ablation may be a good option for smaller tumors. It may not work well in larger tumors.  How is it done?  Tumor ablation is done using a special needle called a probe. The doctor puts it through the skin and into the tumor. The probe sends heat, cold, or chemicals into the tumor. If the tumor is large, the doctor may repeat the process from a different angle. This is to make sure that all parts of the tumor are treated. After the treatment, the doctor removes the probe.  What can you expect after this treatment?  You may be able to go home the same day. But in some cases, you might need to stay in the hospital overnight or longer.  You will have a bandage over your skin where the probe or catheter was inserted. This area may be sore for a day or two.  You will have tests in the months after the procedure to see how well the treatment worked.  Follow-up care is a key part of your treatment and safety. Be sure to make and go to all appointments, and call your doctor if you are

## 2024-07-12 NOTE — DISCHARGE INSTRUCTIONS
[facility]  Special Procedures/Angiography Department    Radiologist:   Kami Stoll MD    Date:    7/12/2024    Liver Ablation Discharge Instructions      Watch for signs of infection:  redness, fever, chills, increased pain, and/or drainage from the site.  If this occurs, call your physician at once.              Keep your dressing clean and dry.  Leave the dressing in place for the next two days. You may shower in 48 hours, please avoid submerging in water until the site is healed.    Resume your previous diet and follow medication reconciliation form.     If you experience severe sweating, severe abdominal pain, dizziness or faintness, go to the nearest Emergency Room immediately.      Avoid any strenuous activity for 24 hours.  Do not lift anything heavier than a small grocery bag (10 pounds) and avoid twisting for the next 5 days.    You will have a bandage over your skin where the probe or catheter was inserted. This area may be sore for a day or two. You may take Tylenol, as directed on the label, for pain.  Avoid ibuprofen (Advil, Motrin) and aspirin as they may cause you to bleed. You may also experience referred pain in the right shoulder. This is common.    Because you received sedation, you are not to drive or sign any legal documents for the next 24 hours.    If you have any questions or concerns, please call [phone] and ask for the nurse on-call     Learning About Tumor Ablation    What is tumor ablation?  These are two ways to treat certain types of tumors without using surgery. These treatments may be used alone or together. They may also be used with other treatments. They may be an option when surgery is not possible or is too risky. These options include tumor embolization and tumor ablation.     Tumor ablation is a way to destroy tumors. It may be done using:  Heat. Radio waves may be used to burn the tumor. This is called radiofrequency ablation. Other

## 2024-07-12 NOTE — PROGRESS NOTES
0730 - Patient ambulatory back to IR recovery at this time. Family member in lobby. Patient is A&Ox4, on RA, and is in NAD at this time. Patient has no complaints of pain at this time. Call bell is within reach, bed locked, and lowered at this time. Patient awaiting to be consented for ordered procedure at this time.    0825 - timeout    0940 - Patient in PACU, brief report given to RNBurt    1030 -patient back to xray recovery    1130 - repeat chest xray    1220 - Discharge instructions reviewed with patient, patient verbalizes understanding of education provided.  Opportunities given for questions and none at this time. Copy of AVS given to patient with radiology phone number included.  Patient is in NAD, on RA, and has no complaints of pain at this time. Patient exhibiting baseline gait.  Discharged from X-ray recovery via wheelchair in stable condition. Pt released with family member via wheelchair.

## 2024-07-12 NOTE — H&P
Transportation Needs: Not on file   Physical Activity: Not on file   Stress: Not on file   Social Connections: Not on file   Intimate Partner Violence: Not on file   Housing Stability: Not on file       Allergies: No Known Allergies    Current Medications:  Current Outpatient Medications   Medication Sig    OTEZLA 30 MG TABS Take 1 tablet by mouth in the morning and at bedtime     No current facility-administered medications for this encounter.     Facility-Administered Medications Ordered in Other Encounters   Medication Dose Route Frequency    0.9 % sodium chloride infusion   IntraVENous Continuous PRN        Physical Exam:  Height 1.727 m (5' 8\"), weight 95.3 kg (210 lb).  GENERAL: alert, cooperative, no distress, appears stated age,   LUNG: Nonlabored respiration on room air  HEART: Well perfused   EXT: No edema BLEs  ABD: Nondistended      Plan of Care/Planned Procedure:  #Solitary liver metastasis from colon cancer  - MWA segment 7 mass. Risks, benefits, and alternatives reviewed with patient and he agrees to proceed with the procedure.     Kami Stoll MD  [facility]  8:07 AM, 7/12/2024

## 2024-07-12 NOTE — ANESTHESIA POSTPROCEDURE EVALUATION
Department of Anesthesiology  Postprocedure Note    Patient: Arnoldo Hubbard  MRN: [id number]  YOB: 1965  Date of evaluation: 7/12/2024    Procedure Summary       Date: 07/12/24 Room / Location: [facility] CT    Anesthesia Start: 0803 Anesthesia Stop: 0942    Procedure: CT RADIOFREQ ABLATION LIVER PERC Diagnosis:       Rectal cancer metastasized to liver (HCC)      (liver met, ablation needed abnormal CT and US)    Scheduled Providers: Floyd Ramirez MD; Machelle Nguyễn APRN - CRNA Responsible Provider: Floyd Ramirez MD    Anesthesia Type: General ASA Status: 3            Anesthesia Type: General    Laila Phase I: Laila Score: 10    Laila Phase II:      Anesthesia Post Evaluation    Patient location during evaluation: PACU  Patient participation: complete - patient participated  Level of consciousness: awake  Airway patency: patent  Nausea & Vomiting: no vomiting  Cardiovascular status: hemodynamically stable  Respiratory status: acceptable  Hydration status: euvolemic    No notable events documented.

## 2024-07-12 NOTE — PROCEDURES
Brief Postoperative Note    Arnoldo Hubbard  YOB: 1965  [id number]    Pre-operative Diagnosis: Metastatic sigmoid carcinoma     Post-operative Diagnosis: Same    Procedure: Segment 7 microwave ablation     Anesthesia: General    Surgeons/Assistants: Kami Stoll MD    Estimated Blood Loss: Minimal     Complications: None    Specimens: Was Obtained: Four 18 gauge core samples     Findings:   CT- and US- guided segment 7 liver mass biopsy.  Four 18 gauge core samples were obtained and handed off to pathology.  CT- and US- guided segment 7 liver mass ablation using a Solero microwave ablation probe. Ablation at 100 W for 4 min was performed followed by ablation at 100 W for an additional 2 minutes after the probe was repositioned.     Electronically signed by Kami Stoll MD on 7/12/2024 at 9:50 AM

## 2024-07-12 NOTE — ANESTHESIA PRE PROCEDURE
Department of Anesthesiology  Preprocedure Note       Name:  Arnoldo Hubbard   Age:  58 y.o.  :  1965                                          MRN:  [id number]         Date:  2024      Surgeon: * Surgery not found *    Procedure:     Medications prior to admission:   Prior to Admission medications    Medication Sig Start Date End Date Taking? Authorizing Provider   OTEZLA 30 MG TABS Take 1 tablet by mouth in the morning and at bedtime 19   Provider, MD James       Current medications:    Current Outpatient Medications   Medication Sig Dispense Refill   • OTEZLA 30 MG TABS Take 1 tablet by mouth in the morning and at bedtime       No current facility-administered medications for this encounter.       Allergies:  No Known Allergies    Problem List:    Patient Active Problem List   Diagnosis Code   • Diverticulitis K57.92   • Colon cancer ([facility]) C18.9   • Colostomy in place ([facility]) Z93.3       Past Medical History:        Diagnosis Date   • Cancer (HCC)     colon-had chemo and surgery   • Hypercholesterolemia    • Psoriasis        Past Surgical History:        Procedure Laterality Date   • ANTERIOR CERVICAL DISCECTOMY W/ FUSION     • CATARACT EXTRACTION Bilateral    • COLONOSCOPY N/A 2023    COLONOSCOPY performed by Evangelina Lopez MD at [facility] ENDOSCOPY   • COLOSTOMY CLOSURE N/A 2023    ROBOTIC COLOSTOMY REVERSAL - CYSTOSCOPY URETERAL CATHETER INSERTION  (E R A S) performed by Evangelina Lopez MD at [facility] MAIN OR   • IR PORT PLACEMENT EQUAL OR GREATER THAN 5 YEARS  2023    IR PORT PLACEMENT EQUAL OR GREATER THAN 5 YEARS 3/14/2023 [facility] RAD ANGIO IR   • KNEE ARTHROSCOPY Right    • OTHER SURGICAL HISTORY      Laparoscopic inguinal hernia repair   • SIGMOID COLECTOMY  2022    w/colostomy   • URETER SURGERY N/A 2023    . performed by Kevin Dominguez II, MD at [facility] MAIN OR       Social History:    Social History     Tobacco Use   • Smoking status: Every Day     Current packs/day:

## 2024-08-22 NOTE — PROGRESS NOTES
Arnoldo Hubbard is a 57 y.o. male here for follow up for colon carcinoma.  7/12/24 Procedure: Segment 7 microwave ablation    Pt doing well. No concerns brought up.   Will see Dr Avina on 10/10/24 for colonoscopy.    1. Have you been to the ER, urgent care clinic since your last visit?  Hospitalized since your last visit? no    2. Have you seen or consulted any other health care providers outside of the [facility] since your last visit?  Include any pap smears or colon screening.  no

## 2024-08-26 ENCOUNTER — OFFICE VISIT (OUTPATIENT)
Age: 59
End: 2024-08-26
Payer: COMMERCIAL

## 2024-08-26 VITALS
SYSTOLIC BLOOD PRESSURE: 160 MMHG | WEIGHT: 220 LBS | OXYGEN SATURATION: 96 % | HEIGHT: 68 IN | DIASTOLIC BLOOD PRESSURE: 90 MMHG | HEART RATE: 88 BPM | TEMPERATURE: 98.2 F | BODY MASS INDEX: 33.34 KG/M2

## 2024-08-26 DIAGNOSIS — C20 RECTAL CANCER METASTASIZED TO LIVER (HCC): Primary | ICD-10-CM

## 2024-08-26 DIAGNOSIS — C78.7 RECTAL CANCER METASTASIZED TO LIVER (HCC): Primary | ICD-10-CM

## 2024-08-26 PROCEDURE — 99214 OFFICE O/P EST MOD 30 MIN: CPT | Performed by: INTERNAL MEDICINE

## 2024-08-26 NOTE — PROGRESS NOTES
atraumatic, no cyanosis or edema   SKIN: no rash or abnormalities   NEUROLOGIC: AOx3. Gait normal.      The above physical exam was reviewed and updated as needed on 08/26/24.         Lab Results   Component Value Date    WBC 9.2 08/21/2023    HGB 13.5 08/21/2023    HCT 40.0 08/21/2023    .4 (H) 08/21/2023    PLT 84 (L) 08/21/2023         Lab Results   Component Value Date     08/21/2023    K 3.8 08/21/2023     08/21/2023    CO2 23 08/21/2023    BUN 7 08/21/2023    CREATININE 0.92 08/21/2023    GLUCOSE 107 (H) 08/21/2023    CALCIUM 8.5 08/21/2023    BILITOT 0.6 08/10/2023    ALKPHOS 136 (H) 08/10/2023    AST 21 08/10/2023    ALT 30 08/10/2023    LABGLOM >60 08/21/2023    AGRATIO 0.9 (L) 05/01/2023    GLOB 3.9 08/10/2023       PET Results (most recent):  PET CT SKULL BASE TO MID THIGH 06/03/2024    Narrative  PET/CT SCAN    PROCEDURE: Following IV injection of 10.10 mCi 18 Fluoro 2 deoxyglucose (FDG)  and a standard uptake delay, PET imaging is performed from the skull vertex to  mid thigh and axial, sagittal and coronal images were acquired. Unenhanced CT is  obtained for anatomic localization, and attenuation correction of the PET scan.  Patient preprocedure blood glucose level: 92 mg/dL.    CORRELATIVE IMAGING STUDIES: Abdomen pelvis CT 5/1/2024.    PRIOR PET: None    HISTORY: The study is requested for restaging colon cancer.    FINDINGS:    HEAD/NECK: No apparent foci of abnormal hypermetabolism. Cerebral evaluation is  limited by normal intense activity.    CHEST: No foci of abnormal hypermetabolism.    ABDOMEN/PELVIS: There is a focus of increased tracer activity in the right  hepatic lobe, maximum SUV 7.5.    SKELETON: No foci of abnormal hypermetabolism in the axial and visualized  appendicular skeleton.    Impression  Focus of increased tracer activity right hepatic lobe is concerning  for metastatic disease. Otherwise normal tracer distribution.      I personally reviewed the images.  Solitary focus of disease in the liver.          Assessment:     Colon cancer with solitary metastasis to the liver, 07/2024     Previously,  Colon adenocarcinoma, sigmoid and moderately differentiated: Dx: 12/21/2022    T4 N0 M0 (Stage IIC)          ECOG PS 0   Intent of treatment - curative   Prognosis: Good      S/P left hemicolectomy 12/21/2022   Perforated colon cancer      Signatera 2/16/2023 - 0.41 Positive   Signatera repeat - negative      Due to perforated colon cancer and T4 disease, the risk of recurrence is significant. I recommend adjuvant chemotherapy. Due to work constraints, I recommend adjuvant chemotherapy with CapOx for at least 3 months.       Adjuvant chemotherapy    CapOx - 4 cycles               Cap 2 cycles    Continue surveillance  CT in 05/01/2024 - no evidence of disease  Signatera MRD - +ve now.   PET CT - solitary focus of disease in the liver.   S/P thermal ablation 07/2024  Repeat MRD pending    His sister has a diagnosis of advanced ovarian cancer. Due to two close relatives having cancer, I recommended Germline testing for Schmitt syndrome.          Plan:       Repeat PET CT   MRD testing results are awaited  Labs in 1 month  Return in 3 months  Germline testing       Signed by: Jasson Mathis MD                     August 26, 2024          CC. Evangelina Lopez MD

## 2024-09-26 DIAGNOSIS — C20 RECTAL CANCER METASTASIZED TO LIVER (HCC): ICD-10-CM

## 2024-09-26 DIAGNOSIS — C78.7 RECTAL CANCER METASTASIZED TO LIVER (HCC): ICD-10-CM

## 2024-09-27 ENCOUNTER — HOSPITAL ENCOUNTER (OUTPATIENT)
Facility: HOSPITAL | Age: 59
Discharge: HOME | End: 2024-09-30
Attending: INTERNAL MEDICINE
Payer: COMMERCIAL

## 2024-09-27 VITALS — BODY MASS INDEX: 32.96 KG/M2 | WEIGHT: 215 LBS

## 2024-09-27 DIAGNOSIS — C78.7 RECTAL CANCER METASTASIZED TO LIVER (HCC): ICD-10-CM

## 2024-09-27 DIAGNOSIS — C20 RECTAL CANCER METASTASIZED TO LIVER (HCC): ICD-10-CM

## 2024-09-27 LAB
GLUCOSE BLD STRIP.AUTO-MCNC: 97 MG/DL (ref 65–117)
SERVICE CMNT-IMP: NORMAL

## 2024-09-27 PROCEDURE — A9609 HC RX DIAGNOSTIC RADIOPHARMACEUTICAL: HCPCS | Performed by: INTERNAL MEDICINE

## 2024-09-27 PROCEDURE — 82962 GLUCOSE BLOOD TEST: CPT

## 2024-09-27 PROCEDURE — 3430000000 HC RX DIAGNOSTIC RADIOPHARMACEUTICAL: Performed by: INTERNAL MEDICINE

## 2024-09-27 PROCEDURE — 78815 PET IMAGE W/CT SKULL-THIGH: CPT

## 2024-09-27 RX ORDER — FLUDEOXYGLUCOSE F-18 500 MCI/ML
10 INJECTION INTRAVENOUS
Status: COMPLETED | OUTPATIENT
Start: 2024-09-27 | End: 2024-09-27

## 2024-09-27 RX ADMIN — FLUDEOXYGLUCOSE F-18 10 MILLICURIE: 500 INJECTION INTRAVENOUS at 11:26

## 2024-09-30 ENCOUNTER — TELEPHONE (OUTPATIENT)
Age: 59
End: 2024-09-30

## 2024-09-30 NOTE — TELEPHONE ENCOUNTER
Pt called and stated that he had a PET Scan on Friday and has the results on [employer] He is requesting a call to discuss what comes next as it appears there is a growth on the scan.     Pt has a pending appt on 11/25.

## 2024-10-03 ENCOUNTER — OFFICE VISIT (OUTPATIENT)
Age: 59
End: 2024-10-03
Payer: COMMERCIAL

## 2024-10-03 VITALS
OXYGEN SATURATION: 97 % | BODY MASS INDEX: 33.74 KG/M2 | SYSTOLIC BLOOD PRESSURE: 148 MMHG | HEART RATE: 90 BPM | WEIGHT: 222.6 LBS | HEIGHT: 68 IN | DIASTOLIC BLOOD PRESSURE: 87 MMHG

## 2024-10-03 DIAGNOSIS — C78.7 RECTAL CANCER METASTASIZED TO LIVER (HCC): Primary | ICD-10-CM

## 2024-10-03 DIAGNOSIS — C20 RECTAL CANCER METASTASIZED TO LIVER (HCC): Primary | ICD-10-CM

## 2024-10-03 PROCEDURE — 99215 OFFICE O/P EST HI 40 MIN: CPT | Performed by: INTERNAL MEDICINE

## 2024-10-03 RX ORDER — CALCIPOTRIENE 50 UG/G
OINTMENT TOPICAL
COMMUNITY

## 2024-10-03 NOTE — PROGRESS NOTES
[facility] [place]   at [facility]   [address], [address]   [place] [address]   [phone]      Oncology Note        Patient: Arnoldo Hubbard  MRN: [id number]   SSN: [id number]    YOB: 1965                Diagnosis:       1. Colon adenocarcinoma, sigmoid and moderately differentiated: Dx: 12/21/22    T4 N0 M0 (Stage IIC)           Treatment:         1. Adjuvant chemotherapy    CapOx - 4 cycles   Cape 2 cycles               (Dose reduce to 1500 mg bid cycle 3)      Subjective:     Arnoldo Hubbard is a 57 y.o. male who I am seeing for a new diagnosis of colon carcinoma. He was noted to have diverticulitis which did not respond to antibiotics treatment. He underwent a left hemicolectomy. The pathology reveals colon adenocarcinoma. 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     Interval History:      He received adjuvant chemotherapy. He is remains asymptomatic. MRD test is positive. PET in June showed a solitary site of disease in the liver. She underwent a thermal ablation in July with Dr. Stoll. Repeat MRD in Aug shows an uptick. PET CT shows another small site of disease in the liver which is FDG avid. He comes in to discuss the next steps.          Review of Systems:      Constitutional: negative   Eyes: negative   Ears, Nose, Mouth, Throat, and Face: negative   Respiratory: negative   Cardiovascular: negative   Gastrointestinal: negative   Genitourinary:negative   Integument/Breast: negative   Hematologic/Lymphatic: negative   Musculoskeletal:negative   Neurological: negative       Review of systems was reviewed and updated as needed on 10/03/24.        Past Medical History:   Diagnosis Date    Arthritis 2018    Cancer (HCC) 2023    colon-had chemo and surgery    Colon cancer (HCC) 2022    Hypercholesterolemia     Psoriasis        Past Surgical History:   Procedure Laterality Date    ANTERIOR CERVICAL DISCECTOMY W/

## 2024-10-03 NOTE — PROGRESS NOTES
Patient identified by name and date of birth  Arnoldo Hubbard is a 58 y.o. male   Chief Complaint   Patient presents with    Follow-up    Discuss Labs     PET scan results      Vitals:    10/03/24 0904 10/03/24 0909   BP: (!) 154/91 (!) 148/87   Site: Left Upper Arm Right Upper Arm   Pulse: 90    SpO2: 97%    Weight: 101 kg (222 lb 9.6 oz)    Height: 1.72 m (5' 7.72\")        No LMP for male patient.  Pain Score:   0 - No pain    \"Have you been to the ER, urgent care clinic since your last visit?  Hospitalized since your last visit?\"    NO    “Have you seen or consulted any other health care providers outside of [facility] since your last visit?”    NO

## 2024-10-10 ENCOUNTER — ANESTHESIA EVENT (OUTPATIENT)
Facility: HOSPITAL | Age: 59
End: 2024-10-10
Payer: COMMERCIAL

## 2024-10-10 ENCOUNTER — ANESTHESIA (OUTPATIENT)
Facility: HOSPITAL | Age: 59
End: 2024-10-10
Payer: COMMERCIAL

## 2024-10-10 ENCOUNTER — HOSPITAL ENCOUNTER (OUTPATIENT)
Facility: HOSPITAL | Age: 59
Setting detail: OUTPATIENT SURGERY
Discharge: HOME | End: 2024-10-10
Attending: STUDENT IN AN ORGANIZED HEALTH CARE EDUCATION/TRAINING PROGRAM | Admitting: STUDENT IN AN ORGANIZED HEALTH CARE EDUCATION/TRAINING PROGRAM
Payer: COMMERCIAL

## 2024-10-10 VITALS
SYSTOLIC BLOOD PRESSURE: 145 MMHG | HEIGHT: 68 IN | RESPIRATION RATE: 21 BRPM | DIASTOLIC BLOOD PRESSURE: 95 MMHG | HEART RATE: 94 BPM | BODY MASS INDEX: 33.37 KG/M2 | WEIGHT: 220.2 LBS | TEMPERATURE: 98.8 F | OXYGEN SATURATION: 95 %

## 2024-10-10 PROCEDURE — 7100000010 HC PHASE II RECOVERY - FIRST 15 MIN: Performed by: STUDENT IN AN ORGANIZED HEALTH CARE EDUCATION/TRAINING PROGRAM

## 2024-10-10 PROCEDURE — 3600007512: Performed by: STUDENT IN AN ORGANIZED HEALTH CARE EDUCATION/TRAINING PROGRAM

## 2024-10-10 PROCEDURE — 2500000003 HC RX 250 WO HCPCS

## 2024-10-10 PROCEDURE — 3600007502: Performed by: STUDENT IN AN ORGANIZED HEALTH CARE EDUCATION/TRAINING PROGRAM

## 2024-10-10 PROCEDURE — 88305 TISSUE EXAM BY PATHOLOGIST: CPT

## 2024-10-10 PROCEDURE — 3700000000 HC ANESTHESIA ATTENDED CARE: Performed by: STUDENT IN AN ORGANIZED HEALTH CARE EDUCATION/TRAINING PROGRAM

## 2024-10-10 PROCEDURE — 6360000002 HC RX W HCPCS

## 2024-10-10 PROCEDURE — 3700000001 HC ADD 15 MINUTES (ANESTHESIA): Performed by: STUDENT IN AN ORGANIZED HEALTH CARE EDUCATION/TRAINING PROGRAM

## 2024-10-10 PROCEDURE — 2580000003 HC RX 258

## 2024-10-10 PROCEDURE — 2709999900 HC NON-CHARGEABLE SUPPLY: Performed by: STUDENT IN AN ORGANIZED HEALTH CARE EDUCATION/TRAINING PROGRAM

## 2024-10-10 RX ORDER — LIDOCAINE HYDROCHLORIDE 20 MG/ML
INJECTION, SOLUTION EPIDURAL; INFILTRATION; INTRACAUDAL; PERINEURAL
Status: DISCONTINUED | OUTPATIENT
Start: 2024-10-10 | End: 2024-10-10 | Stop reason: SDUPTHER

## 2024-10-10 RX ORDER — SODIUM CHLORIDE 9 MG/ML
25 INJECTION, SOLUTION INTRAVENOUS PRN
Status: CANCELLED | OUTPATIENT
Start: 2024-10-10

## 2024-10-10 RX ORDER — SODIUM CHLORIDE 0.9 % (FLUSH) 0.9 %
5-40 SYRINGE (ML) INJECTION PRN
Status: CANCELLED | OUTPATIENT
Start: 2024-10-10

## 2024-10-10 RX ORDER — SODIUM CHLORIDE 0.9 % (FLUSH) 0.9 %
5-40 SYRINGE (ML) INJECTION EVERY 12 HOURS SCHEDULED
Status: CANCELLED | OUTPATIENT
Start: 2024-10-10

## 2024-10-10 RX ORDER — SODIUM CHLORIDE 9 MG/ML
INJECTION, SOLUTION INTRAVENOUS
Status: DISCONTINUED | OUTPATIENT
Start: 2024-10-10 | End: 2024-10-10 | Stop reason: SDUPTHER

## 2024-10-10 RX ADMIN — SODIUM CHLORIDE: 9 INJECTION, SOLUTION INTRAVENOUS at 08:15

## 2024-10-10 RX ADMIN — PROPOFOL 40 MG: 10 INJECTION, EMULSION INTRAVENOUS at 08:22

## 2024-10-10 RX ADMIN — PROPOFOL 40 MG: 10 INJECTION, EMULSION INTRAVENOUS at 08:24

## 2024-10-10 RX ADMIN — LIDOCAINE HYDROCHLORIDE 100 MG: 20 INJECTION, SOLUTION EPIDURAL; INFILTRATION; INTRACAUDAL; PERINEURAL at 08:17

## 2024-10-10 RX ADMIN — PROPOFOL 40 MG: 10 INJECTION, EMULSION INTRAVENOUS at 08:20

## 2024-10-10 RX ADMIN — PROPOFOL 50 MG: 10 INJECTION, EMULSION INTRAVENOUS at 08:19

## 2024-10-10 RX ADMIN — PROPOFOL 30 MG: 10 INJECTION, EMULSION INTRAVENOUS at 08:18

## 2024-10-10 RX ADMIN — PROPOFOL 80 MG: 10 INJECTION, EMULSION INTRAVENOUS at 08:17

## 2024-10-10 ASSESSMENT — PAIN - FUNCTIONAL ASSESSMENT: PAIN_FUNCTIONAL_ASSESSMENT: 0-10

## 2024-10-10 ASSESSMENT — LIFESTYLE VARIABLES: SMOKING_STATUS: 1

## 2024-10-10 NOTE — H&P
Colonoscopy H&P    History:  Preop office visit H&P reviewed, no changes.   58 y.o. male here for colonoscopy for personal history of colon cancer.    Past Medical History:   Diagnosis Date    Arthritis 2018    Colon cancer (HCC) 2022    Hypercholesterolemia     Psoriasis         Past Surgical History:   Procedure Laterality Date    ANTERIOR CERVICAL DISCECTOMY W/ FUSION      CATARACT EXTRACTION Bilateral     COLONOSCOPY N/A 08/17/2023    COLONOSCOPY performed by Evangelina Lopez MD at [facility] ENDOSCOPY    COLOSTOMY CLOSURE N/A 08/18/2023    ROBOTIC COLOSTOMY REVERSAL - CYSTOSCOPY URETERAL CATHETER INSERTION  (E R A S) performed by Evangelina Lopez MD at [facility] MAIN OR    CT RADIOFREQ ABLATION LIVER PERC  07/12/2024    CT RADIOFREQ ABLATION LIVER PERC 7/12/2024 [facility] RAD CT    HERNIA REPAIR      Laparoscopic inguinal hernia repair    IR PORT PLACEMENT EQUAL OR GREATER THAN 5 YEARS  03/14/2023    IR PORT PLACEMENT EQUAL OR GREATER THAN 5 YEARS 3/14/2023 [facility] RAD ANGIO IR    KNEE ARTHROSCOPY Right     SIGMOID COLECTOMY  12/21/2022    w/colostomy    URETER SURGERY N/A 08/18/2023    . performed by Kevin Dominguez II, MD at [facility] MAIN OR       Current Outpatient Medications   Medication Instructions    calcipotriene (DOVONEX) 0.005 % ointment 1 application to affected area Externally twice a day for 90 days    OTEZLA 30 MG TABS 1 tablet, Oral, 2 times daily        ROS:  Negative except as noted above    Exam:  No data found.     Constitutional:       Appearance: Normal appearance.   HENT:      Head: Normocephalic and atraumatic.   Eyes:      Extraocular Movements: Extraocular movements intact.      Pupils: Pupils are equal, round, and reactive to light.   Cardiovascular:      Rate and Rhythm: Normal rate and regular rhythm.   Pulmonary:      Effort: Pulmonary effort is normal.   Abdominal:      General: Abdomen is flat.      Palpations: Abdomen is soft.   Musculoskeletal:         General: Normal range of motion.      Cervical back:

## 2024-10-10 NOTE — PERIOP NOTE
ARRIVAL INFORMATION:  Verified patient name and date of birth, scheduled procedure, and informed consent.     : Ibis maloney contact number: [phone]  Physician and staff can share information with the .     Receive texts: yes    Belongings with patient include:  Clothing,None    GI FOCUSED ASSESSMENT:  Neuro: Awake, alert, oriented x4  Respiratory: even and unlabored   GI: soft and non-distended  EKG Rhythm: normal sinus rhythm    Education:Reviewed general discharge instructions and  information.       Scope pre cleaned by Sarahi ROBERT

## 2024-10-10 NOTE — OP NOTE
Colonoscopy Procedure Note     Procedure: Colonoscopy     Indications: colon cancer surveillence     Procedure Details      Informed consent was obtained for the procedure. Risks of perforation and hemorrhage were discussed. The patient was placed in the left lateral decubitus position, the anal region was examined, a rectal performed, then the colonoscope was inserted and advanced without difficulty. The prep was excellent. The instrument was withdrawn and retroflexed with excellent views throughout.     Findings:  ANUS: Anal exam reveals no masses or hemorrhoids, sphincter tone is normal.  RECTUM: colorectal anastomosis well healed, small area of what appeared to be benign granulation tissue, biopsied with jae forceps  SIGMOID COLON: surgically absent  DESCENDING COLON: single diverticulum  TRANSVERSE COLON: The mucosa is normal with good vascular pattern and without ulcers, diverticula, and polyps.   ASCENDING COLON: The mucosa is normal with good vascular pattern and without ulcers, diverticula, and polyps.   CECUM: The appendiceal orifice appears normal. The ileocecal valve appears normal.   TERMINAL ILEUM: The terminal ileum was not entered.      Specimens: colorectal anastomosis biopsies           Complications:  None; patient tolerated the procedure well.           Disposition: PACU - hemodynamically stable.           Condition: stable     Impression:    Granulation appearing tissue at a well-healed anastomosis, otherwise normal     Recommendations:  Repeat colonoscopy in 3 years as long as biopsies are normal.

## 2024-10-10 NOTE — DISCHARGE INSTRUCTIONS
Arnoldo Hubbard  [id number]  1965    COLON DISCHARGE INSTRUCTIONS  Discomfort:  Redness at IV site- apply warm compress to area; if redness or soreness persist- contact your physician  There may be a slight amount of blood passed from the rectum  Gaseous discomfort- walking, belching will help relieve any discomfort  You may not operate a vehicle for 12 hours  You may not engage in an occupation involving machinery or appliances for rest of today  You may not drink alcoholic beverages for at least 12 hours  Avoid making any critical decisions for at least 24 hour  DIET:   High fiber diet.   - however -  remember your colon is empty and a heavy meal will produce gas.   Avoid these foods:  vegetables, fried / greasy foods, carbonated drinks for today    MEDICATIONS:  [unfilled]     ACTIVITY:  You may resume your normal daily activities it is recommended that you spend the remainder of the day resting -  avoid any strenuous activity.    CALL M.D.  ANY SIGN OF:   Increasing pain, nausea, vomiting  Abdominal distension (swelling)  New increased bleeding (oral or rectal)  Fever (chills)  Pain in chest area  Bloody discharge from nose or mouth  Shortness of breath     Follow-up Instructions:   Call Evangelina Lopez MD if any questions or problems.   Telephone # [phone]  Biopsy results will be available in  7 to10 days  Should have a repeat colonoscopy in 3 years as long as biopsies return normal.    COLONOSCOPY FINDINGS:  Your colonoscopy showed: well healed colorectal anastomosis, small amount of benign appearing tissue at anastomosis that I biopsied.  Patient Education on Sedation / Analgesia Administered for Procedure      For 24 hours after general anesthesia or intravenous analgesia / sedation:  Have someone responsible help you with your care  Limit your activities  Do not drive and operate hazardous machinery  Do not make important personal, legal or business decisions  Do not drink alcoholic  beverages  If you have not urinated within 8 hours after discharge, please contact your physician  Resume your medications unless otherwise instructed    For 24 hours after general anesthesia or intravenous analgesia / sedation  you may experience:  Drowsiness, dizziness, sleepiness, or confusion  Difficulty remembering or delayed reaction times  Difficulty with your balance, especially while walking, move slowly and carefully, do not make sudden position changes  Difficulty focusing or blurred vision    You may not be aware of slight changes in your behavior and/or your reaction time because of the medication used during and after your procedure.    Report the following to your physician:  Excessive pain, swelling, redness or odor of or around the surgical area  Temperature over 100.5  Nausea and vomiting lasting longer than 4 hours or if unable to take medications  Any signs of decreased circulation or nerve impairment to extremity: change in color, persistent numbness, tingling, coldness or increase pain  Any questions or concerns    IF YOU REPORT TO AN EMERGENCY ROOM, DOCTOR'S OFFICE OR HOSPITAL WITHIN 24 HOURS AFTER YOUR PROCEDURE, BRING THIS SHEET AND YOUR AFTER VISIT SUMMARY WITH YOU AND GIVE IT TO THE PHYSICIAN OR NURSE ATTENDING YOU.

## 2024-10-10 NOTE — ANESTHESIA PRE PROCEDURE
Department of Anesthesiology  Preprocedure Note       Name:  Arnoldo Hubbard   Age:  58 y.o.  :  1965                                          MRN:  [id number]         Date:  10/10/2024      Surgeon: Surgeon(s):  Evangelina Lopez MD    Procedure: Procedure(s):  COLONOSCOPY    Medications prior to admission:   Prior to Admission medications    Medication Sig Start Date End Date Taking? Authorizing Provider   OTEZLA 30 MG TABS Take 1 tablet by mouth in the morning and at bedtime 19  Yes James Rueda MD   calcipotriene (DOVONEX) 0.005 % ointment 1 application to affected area Externally twice a day for 90 days    Provider, MD James       Current medications:    No current facility-administered medications for this encounter.       Allergies:  No Known Allergies    Problem List:    Patient Active Problem List   Diagnosis Code   • Diverticulitis K57.92   • Colon cancer (HCC) C18.9   • Colostomy in place (HCC) Z93.3       Past Medical History:        Diagnosis Date   • Arthritis    • Colon cancer (HCC)    • Hypercholesterolemia    • Psoriasis        Past Surgical History:        Procedure Laterality Date   • ANTERIOR CERVICAL DISCECTOMY W/ FUSION     • CATARACT EXTRACTION Bilateral    • COLONOSCOPY N/A 2023    COLONOSCOPY performed by Evangelina Lopez MD at [facility] ENDOSCOPY   • COLOSTOMY CLOSURE N/A 2023    ROBOTIC COLOSTOMY REVERSAL - CYSTOSCOPY URETERAL CATHETER INSERTION  (E R A S) performed by Evangelina Lopez MD at [facility] MAIN OR   • CT RADIOFREQ ABLATION LIVER PERC  2024    CT RADIOFREQ ABLATION LIVER PERC 2024 [facility] RAD CT   • HERNIA REPAIR      Laparoscopic inguinal hernia repair   • IR PORT PLACEMENT EQUAL OR GREATER THAN 5 YEARS  2023    IR PORT PLACEMENT EQUAL OR GREATER THAN 5 YEARS 3/14/2023 [facility] RAD ANGIO IR   • KNEE ARTHROSCOPY Right    • SIGMOID COLECTOMY  2022    w/colostomy   • URETER SURGERY N/A 2023    . performed by Kevin Dominguez II,

## 2024-10-10 NOTE — ANESTHESIA POSTPROCEDURE EVALUATION
Department of Anesthesiology  Postprocedure Note    Patient: Arnoldo Hubbard  MRN: [id number]  YOB: 1965  Date of evaluation: 10/10/2024    Procedure Summary       Date: 10/10/24 Room / Location: [facility] ENDO 03 / [facility] ENDOSCOPY    Anesthesia Start: 0814 Anesthesia Stop: 0831    Procedure: COLONOSCOPY (Lower GI Region) Diagnosis:       Personal history of colon cancer      (Personal history of colon cancer [Z85.038])    Surgeons: Evangelina Lopez MD Responsible Provider: Matt Powers MD    Anesthesia Type: MAC ASA Status: 2            Anesthesia Type: MAC    Laila Phase I: Laila Score: 10    Laila Phase II: Laila Score: 10    Anesthesia Post Evaluation    Patient location during evaluation: PACU  Patient participation: complete - patient participated  Level of consciousness: awake and alert  Airway patency: patent  Nausea & Vomiting: no nausea  Cardiovascular status: hemodynamically stable  Respiratory status: acceptable  Hydration status: euvolemic    No notable events documented.

## 2024-10-14 ENCOUNTER — CLINICAL DOCUMENTATION (OUTPATIENT)
Age: 59
End: 2024-10-14

## 2024-10-21 ENCOUNTER — CLINICAL DOCUMENTATION (OUTPATIENT)
Age: 59
End: 2024-10-21

## 2024-10-21 NOTE — PROGRESS NOTES
Pt called to let office know his ablation got pushed back to 11/15 and wasn't sure when to get MRD done.    I advised pt we will get MRD to be done 2 weeks after procedure and if he could get his routine labs done atleast 2 days prior to new office visit to be scheduled at this time ( approx 2 weeks after signatera draw).

## 2024-11-14 NOTE — PROGRESS NOTES
Patient reached   NPO status reinforced, procedure and arrival time verified, blood thinner status verified and  verified. Patient repeated all instructions and verbalized understanding of procedure.

## 2024-11-15 ENCOUNTER — HOSPITAL ENCOUNTER (OUTPATIENT)
Facility: HOSPITAL | Age: 59
Discharge: HOME | End: 2024-11-18
Payer: COMMERCIAL

## 2024-11-15 ENCOUNTER — ANESTHESIA (OUTPATIENT)
Facility: HOSPITAL | Age: 59
End: 2024-11-15
Payer: COMMERCIAL

## 2024-11-15 ENCOUNTER — ANESTHESIA EVENT (OUTPATIENT)
Facility: HOSPITAL | Age: 59
End: 2024-11-15
Payer: COMMERCIAL

## 2024-11-15 VITALS
RESPIRATION RATE: 20 BRPM | WEIGHT: 212 LBS | HEIGHT: 68 IN | OXYGEN SATURATION: 92 % | DIASTOLIC BLOOD PRESSURE: 74 MMHG | TEMPERATURE: 97.4 F | HEART RATE: 74 BPM | SYSTOLIC BLOOD PRESSURE: 138 MMHG | BODY MASS INDEX: 32.13 KG/M2

## 2024-11-15 DIAGNOSIS — C18.7 MALIGNANT NEOPLASM OF SIGMOID COLON (HCC): ICD-10-CM

## 2024-11-15 DIAGNOSIS — C78.7 METASTASIS TO LIVER (HCC): ICD-10-CM

## 2024-11-15 PROCEDURE — 76942 ECHO GUIDE FOR BIOPSY: CPT

## 2024-11-15 PROCEDURE — 6370000000 HC RX 637 (ALT 250 FOR IP): Performed by: ANESTHESIOLOGY

## 2024-11-15 PROCEDURE — 6360000002 HC RX W HCPCS: Performed by: NURSE ANESTHETIST, CERTIFIED REGISTERED

## 2024-11-15 PROCEDURE — 88342 IMHCHEM/IMCYTCHM 1ST ANTB: CPT

## 2024-11-15 PROCEDURE — 2580000003 HC RX 258: Performed by: STUDENT IN AN ORGANIZED HEALTH CARE EDUCATION/TRAINING PROGRAM

## 2024-11-15 PROCEDURE — 88341 IMHCHEM/IMCYTCHM EA ADD ANTB: CPT

## 2024-11-15 PROCEDURE — 88360 TUMOR IMMUNOHISTOCHEM/MANUAL: CPT

## 2024-11-15 PROCEDURE — 3700000000 HC ANESTHESIA ATTENDED CARE

## 2024-11-15 PROCEDURE — 2500000003 HC RX 250 WO HCPCS: Performed by: NURSE ANESTHETIST, CERTIFIED REGISTERED

## 2024-11-15 PROCEDURE — 71045 X-RAY EXAM CHEST 1 VIEW: CPT

## 2024-11-15 PROCEDURE — 2709999900 CT RFA LIVER TUMOR(S) PERC

## 2024-11-15 PROCEDURE — 6360000002 HC RX W HCPCS: Performed by: STUDENT IN AN ORGANIZED HEALTH CARE EDUCATION/TRAINING PROGRAM

## 2024-11-15 PROCEDURE — 7100000000 HC PACU RECOVERY - FIRST 15 MIN

## 2024-11-15 PROCEDURE — 6360000002 HC RX W HCPCS

## 2024-11-15 PROCEDURE — 2580000003 HC RX 258: Performed by: NURSE ANESTHETIST, CERTIFIED REGISTERED

## 2024-11-15 PROCEDURE — 6360000004 HC RX CONTRAST MEDICATION: Performed by: INTERNAL MEDICINE

## 2024-11-15 PROCEDURE — 7100000001 HC PACU RECOVERY - ADDTL 15 MIN

## 2024-11-15 PROCEDURE — 3700000001 HC ADD 15 MINUTES (ANESTHESIA)

## 2024-11-15 PROCEDURE — 88333 PATH CONSLTJ SURG CYTO XM 1: CPT

## 2024-11-15 PROCEDURE — 2709999900 US BIOPSY LIVER PERCUTANEOUS

## 2024-11-15 PROCEDURE — 88307 TISSUE EXAM BY PATHOLOGIST: CPT

## 2024-11-15 RX ORDER — OXYCODONE HYDROCHLORIDE 5 MG/1
10 TABLET ORAL PRN
Status: COMPLETED | OUTPATIENT
Start: 2024-11-15 | End: 2024-11-15

## 2024-11-15 RX ORDER — DEXAMETHASONE SODIUM PHOSPHATE 4 MG/ML
4 INJECTION, SOLUTION INTRA-ARTICULAR; INTRALESIONAL; INTRAMUSCULAR; INTRAVENOUS; SOFT TISSUE
Status: ACTIVE | OUTPATIENT
Start: 2024-11-15 | End: 2024-11-16

## 2024-11-15 RX ORDER — NALOXONE HYDROCHLORIDE 0.4 MG/ML
INJECTION, SOLUTION INTRAMUSCULAR; INTRAVENOUS; SUBCUTANEOUS PRN
Status: DISCONTINUED | OUTPATIENT
Start: 2024-11-15 | End: 2024-11-19 | Stop reason: HOSPADM

## 2024-11-15 RX ORDER — ALBUTEROL SULFATE 0.83 MG/ML
SOLUTION RESPIRATORY (INHALATION)
Status: COMPLETED
Start: 2024-11-15 | End: 2024-11-15

## 2024-11-15 RX ORDER — PHENYLEPHRINE HCL IN 0.9% NACL 0.4MG/10ML
SYRINGE (ML) INTRAVENOUS
Status: DISCONTINUED | OUTPATIENT
Start: 2024-11-15 | End: 2024-11-15 | Stop reason: SDUPTHER

## 2024-11-15 RX ORDER — ONDANSETRON 2 MG/ML
INJECTION INTRAMUSCULAR; INTRAVENOUS
Status: DISCONTINUED | OUTPATIENT
Start: 2024-11-15 | End: 2024-11-15 | Stop reason: SDUPTHER

## 2024-11-15 RX ORDER — LIDOCAINE HYDROCHLORIDE 20 MG/ML
INJECTION, SOLUTION EPIDURAL; INFILTRATION; INTRACAUDAL; PERINEURAL
Status: DISCONTINUED | OUTPATIENT
Start: 2024-11-15 | End: 2024-11-15 | Stop reason: SDUPTHER

## 2024-11-15 RX ORDER — OXYCODONE HYDROCHLORIDE 5 MG/1
5 TABLET ORAL PRN
Status: COMPLETED | OUTPATIENT
Start: 2024-11-15 | End: 2024-11-15

## 2024-11-15 RX ORDER — IOPAMIDOL 755 MG/ML
100 INJECTION, SOLUTION INTRAVASCULAR
Status: COMPLETED | OUTPATIENT
Start: 2024-11-15 | End: 2024-11-15

## 2024-11-15 RX ORDER — HYDRALAZINE HYDROCHLORIDE 20 MG/ML
10 INJECTION INTRAMUSCULAR; INTRAVENOUS
Status: DISCONTINUED | OUTPATIENT
Start: 2024-11-15 | End: 2024-11-19 | Stop reason: HOSPADM

## 2024-11-15 RX ORDER — SODIUM CHLORIDE 9 MG/ML
INJECTION, SOLUTION INTRAVENOUS PRN
Status: DISCONTINUED | OUTPATIENT
Start: 2024-11-15 | End: 2024-11-19 | Stop reason: HOSPADM

## 2024-11-15 RX ORDER — 0.9 % SODIUM CHLORIDE 0.9 %
INTRAVENOUS SOLUTION INTRAVENOUS
Status: DISCONTINUED | OUTPATIENT
Start: 2024-11-15 | End: 2024-11-15 | Stop reason: SDUPTHER

## 2024-11-15 RX ORDER — DIPHENHYDRAMINE HYDROCHLORIDE 50 MG/ML
12.5 INJECTION INTRAMUSCULAR; INTRAVENOUS
Status: ACTIVE | OUTPATIENT
Start: 2024-11-15 | End: 2024-11-16

## 2024-11-15 RX ORDER — ROCURONIUM BROMIDE 10 MG/ML
INJECTION, SOLUTION INTRAVENOUS
Status: DISCONTINUED | OUTPATIENT
Start: 2024-11-15 | End: 2024-11-15 | Stop reason: SDUPTHER

## 2024-11-15 RX ORDER — FENTANYL CITRATE 50 UG/ML
25 INJECTION, SOLUTION INTRAMUSCULAR; INTRAVENOUS EVERY 5 MIN PRN
Status: DISCONTINUED | OUTPATIENT
Start: 2024-11-15 | End: 2024-11-19 | Stop reason: HOSPADM

## 2024-11-15 RX ORDER — SODIUM CHLORIDE 0.9 % (FLUSH) 0.9 %
5-40 SYRINGE (ML) INJECTION EVERY 12 HOURS SCHEDULED
Status: DISCONTINUED | OUTPATIENT
Start: 2024-11-15 | End: 2024-11-19 | Stop reason: HOSPADM

## 2024-11-15 RX ORDER — PROCHLORPERAZINE EDISYLATE 5 MG/ML
5 INJECTION INTRAMUSCULAR; INTRAVENOUS
Status: ACTIVE | OUTPATIENT
Start: 2024-11-15 | End: 2024-11-16

## 2024-11-15 RX ORDER — MIDAZOLAM HYDROCHLORIDE 5 MG/5ML
2 INJECTION, SOLUTION INTRAMUSCULAR; INTRAVENOUS
Status: ACTIVE | OUTPATIENT
Start: 2024-11-15 | End: 2024-11-16

## 2024-11-15 RX ORDER — MEPERIDINE HYDROCHLORIDE 25 MG/ML
12.5 INJECTION INTRAMUSCULAR; INTRAVENOUS; SUBCUTANEOUS EVERY 5 MIN PRN
Status: DISCONTINUED | OUTPATIENT
Start: 2024-11-15 | End: 2024-11-19 | Stop reason: HOSPADM

## 2024-11-15 RX ORDER — ALBUTEROL SULFATE 0.83 MG/ML
2.5 SOLUTION RESPIRATORY (INHALATION) ONCE
Status: COMPLETED | OUTPATIENT
Start: 2024-11-15 | End: 2024-11-15

## 2024-11-15 RX ORDER — OXYCODONE AND ACETAMINOPHEN 5; 325 MG/1; MG/1
1 TABLET ORAL EVERY 4 HOURS PRN
Qty: 20 TABLET | Refills: 0 | Status: SHIPPED | OUTPATIENT
Start: 2024-11-15 | End: 2024-11-20

## 2024-11-15 RX ORDER — HYDROMORPHONE HYDROCHLORIDE 1 MG/ML
0.5 INJECTION, SOLUTION INTRAMUSCULAR; INTRAVENOUS; SUBCUTANEOUS EVERY 5 MIN PRN
Status: DISCONTINUED | OUTPATIENT
Start: 2024-11-15 | End: 2024-11-19 | Stop reason: HOSPADM

## 2024-11-15 RX ORDER — SODIUM CHLORIDE 0.9 % (FLUSH) 0.9 %
5-40 SYRINGE (ML) INJECTION PRN
Status: DISCONTINUED | OUTPATIENT
Start: 2024-11-15 | End: 2024-11-19 | Stop reason: HOSPADM

## 2024-11-15 RX ORDER — DEXAMETHASONE SODIUM PHOSPHATE 4 MG/ML
INJECTION, SOLUTION INTRA-ARTICULAR; INTRALESIONAL; INTRAMUSCULAR; INTRAVENOUS; SOFT TISSUE
Status: DISCONTINUED | OUTPATIENT
Start: 2024-11-15 | End: 2024-11-15 | Stop reason: SDUPTHER

## 2024-11-15 RX ORDER — PROPOFOL 10 MG/ML
INJECTION, EMULSION INTRAVENOUS
Status: DISCONTINUED | OUTPATIENT
Start: 2024-11-15 | End: 2024-11-15 | Stop reason: SDUPTHER

## 2024-11-15 RX ADMIN — ROCURONIUM BROMIDE 20 MG: 10 INJECTION INTRAVENOUS at 10:04

## 2024-11-15 RX ADMIN — PROPOFOL 200 MG: 10 INJECTION, EMULSION INTRAVENOUS at 09:43

## 2024-11-15 RX ADMIN — ROCURONIUM BROMIDE 30 MG: 10 INJECTION INTRAVENOUS at 09:43

## 2024-11-15 RX ADMIN — PROPOFOL 100 MG: 10 INJECTION, EMULSION INTRAVENOUS at 09:50

## 2024-11-15 RX ADMIN — ONDANSETRON HYDROCHLORIDE 4 MG: 2 INJECTION, SOLUTION INTRAMUSCULAR; INTRAVENOUS at 10:19

## 2024-11-15 RX ADMIN — ROCURONIUM BROMIDE 20 MG: 10 INJECTION INTRAVENOUS at 10:27

## 2024-11-15 RX ADMIN — SUGAMMADEX 200 MG: 100 INJECTION, SOLUTION INTRAVENOUS at 11:15

## 2024-11-15 RX ADMIN — ALBUTEROL SULFATE 2.5 MG: 2.5 SOLUTION RESPIRATORY (INHALATION) at 12:00

## 2024-11-15 RX ADMIN — Medication 120 MCG: at 09:59

## 2024-11-15 RX ADMIN — OXYCODONE 10 MG: 5 TABLET ORAL at 11:52

## 2024-11-15 RX ADMIN — ALBUTEROL SULFATE 2.5 MG: 0.83 SOLUTION RESPIRATORY (INHALATION) at 12:00

## 2024-11-15 RX ADMIN — LIDOCAINE HYDROCHLORIDE 100 MG: 20 INJECTION, SOLUTION EPIDURAL; INFILTRATION; INTRACAUDAL; PERINEURAL at 09:43

## 2024-11-15 RX ADMIN — CEFAZOLIN 2000 MG: 1 INJECTION, POWDER, FOR SOLUTION INTRAMUSCULAR; INTRAVENOUS; PARENTERAL at 09:53

## 2024-11-15 RX ADMIN — IOPAMIDOL 160 ML: 755 INJECTION, SOLUTION INTRAVENOUS at 11:22

## 2024-11-15 RX ADMIN — DEXAMETHASONE SODIUM PHOSPHATE 12 MG: 4 INJECTION, SOLUTION INTRA-ARTICULAR; INTRALESIONAL; INTRAMUSCULAR; INTRAVENOUS; SOFT TISSUE at 10:10

## 2024-11-15 RX ADMIN — SODIUM CHLORIDE 900 ML: 9 INJECTION, SOLUTION INTRAVENOUS at 11:28

## 2024-11-15 ASSESSMENT — PAIN DESCRIPTION - ORIENTATION: ORIENTATION: RIGHT

## 2024-11-15 ASSESSMENT — PAIN DESCRIPTION - DESCRIPTORS: DESCRIPTORS: ACHING;DISCOMFORT

## 2024-11-15 ASSESSMENT — PAIN SCALES - GENERAL: PAINLEVEL_OUTOF10: 6

## 2024-11-15 ASSESSMENT — PAIN - FUNCTIONAL ASSESSMENT: PAIN_FUNCTIONAL_ASSESSMENT: NONE - DENIES PAIN

## 2024-11-15 ASSESSMENT — PAIN DESCRIPTION - LOCATION: LOCATION: ABDOMEN

## 2024-11-15 NOTE — PROGRESS NOTES
0820 - pt ambulated back to xray recovery with RN.  0835 - Pt consented by helena and MD Stoll at [facility].  0840 - Pt consented by MD Stoll at [facility].

## 2024-11-15 NOTE — ANESTHESIA POSTPROCEDURE EVALUATION
Department of Anesthesiology  Postprocedure Note    Patient: Arnoldo Hubbard  MRN: [id number]  YOB: 1965  Date of evaluation: 11/15/2024    Procedure Summary       Date: 11/15/24 Room / Location: [facility] CT    Anesthesia Start: 0938 Anesthesia Stop: 1128    Procedure: CT RADIOFREQ ABLATION LIVER PERC Diagnosis:       Malignant neoplasm of sigmoid colon (HCC)      Metastasis to liver (HCC)    Scheduled Providers: Floyd Ramirez MD; Michael Lux APRN - CRNA Responsible Provider: Floyd Ramirez MD    Anesthesia Type: General ASA Status: 3            Anesthesia Type: General    Laila Phase I: Laila Score: 10    Laila Phase II:      Anesthesia Post Evaluation    Patient location during evaluation: PACU  Patient participation: complete - patient participated  Level of consciousness: awake  Airway patency: patent  Nausea & Vomiting: no vomiting  Cardiovascular status: hemodynamically stable  Respiratory status: acceptable  Hydration status: euvolemic    No notable events documented.

## 2024-11-15 NOTE — PROCEDURES
Brief Postoperative Note    Arnoldo Hubbard  YOB: 1965  [id number]    Pre-operative Diagnosis: Oligometastatic colorectal carcinoma to liver, segment 7    Post-operative Diagnosis: Same    Procedure: CT- and US- guided liver mass biopsy and ablation     Anesthesia: General    Surgeons/Assistants: Kami Stoll MD    Estimated Blood Loss: Minimal     Complications: None    Specimens: Was Obtained: Two 18 gauge core samples     Findings:   CT- and US- guided segment 7 liver mass biopsy and ablation.     Electronically signed by Kami Stoll MD on 11/15/2024 at 11:15 AM

## 2024-11-15 NOTE — DISCHARGE INSTRUCTIONS
[facility]  Department of Interventional Radiology  [address], [place] [id number] [phone]    LIVER ABLATION DISCHARGE EDUCATION    Radiologist:   [facility] Radiology- Kami Stoll MD      Date:   11/15/2024    Information:   Microwave ablation is a treatment that uses microwave energy to heat and destroy cancer cells. Probes are placed into the tumor which heat the surrounding tissue and destroy the cancer cells. The number of probes used is dependent on the size of the tumor. This procedure is done under Conscious Sedation and CT (Cat Scan) guidance. Depending on the size of the tumor, a microwave ablation can shrink or kill a tumor. This procedure can often be repeated if the cancer comes back (recurs), or if the tumor has an incomplete response.     What should I expect after the Liver Ablation?    Pain post procedure may last up to 5-7 days, tapering in intensity. This usually does not require more than a few days of prescription pain medicine and many patients   only use over the counter pain medication. To relieve pain, take Tylenol (acetaminophen) or the pain medicine your doctor prescribed. Avoid ibuprofen (Advil, Motrin) and   aspirin as they may cause you to bleed.   Do not plan anything active or requiring your close attention for the first few days following discharge from the hospital. If you work, you may return as soon as you feel   able. Most patients do not need more than 2-3 days away from work.    There are no dietary restrictions specifically due to this procedure. If you had restrictions prior due to other treatments or diseases such as diabetes, cardiovascular   disease, or renal disease, continue with these.     Bathing & Wound Care:    It is okay to shower 24 hours after the procedure. Gently wash the catheter insertion site with soap and water, do not scrub only after 3 days post procedure. Do not   bathe or soak in water for 3 days

## 2024-11-15 NOTE — FLOWSHEET NOTE
11/15/24 1125   Handoff   Communication Given Transfer Handoff   Handoff Given To Dora VOSS   Handoff Received From CRYSTAL VOSS and YG Lux CRNA   Handoff Communication Face to Face;At bedside        11/15/24 1154   Handoff   Communication Given Shift Handoff   Handoff phase Phase I relief   Handoff Given To Burt VOSS   Handoff Received From Dora VOSS   Handoff Communication Face to Face;At bedside

## 2024-11-15 NOTE — H&P
Radiology History and Physical    Patient: Arnoldo Hubbard 59 y.o. male       Chief Complaint: Metastatic colon adenocarcinoma        History of Present Illness: Mr. Hubbard is a 58 y.o. man with HLD and sigmoid colon adenocarcinoma discovered incidentally after sigmoidectomy for nonhealing diverticulitis in 2022 who is referred by Dr. Mathis for oligometastatic disease to the liver. A PET/CT 6/3/2024 showed a solitary hypermetabolic nodule in the right hepatic lobe (segment 7), for which he underwent biopsy and ablation of the mass on 7/12/2024. Pathology from the procedure was consistent with metastatic adenocarcinoma. Unfortunately, a restaging PET on 9/27/2024 showed a solitary right hepatic focus of hypermetabolism (SUV 9.6), similar in size and activity to the treated metastasis (SUV 7.5) but appearing to be located slightly cephalad to the original metastasis. He presents for planned repeat MWA     History:    Past Medical History:   Diagnosis Date    Arthritis 2018    Colon cancer (HCC) 2022    Hypercholesterolemia     Psoriasis      Family History   Problem Relation Age of Onset    Atrial Fibrillation Mother     Stroke Mother     Heart Disease Father     No Known Problems Sister     Uterine Cancer Sister     Diabetes Sister     Crohn's Disease Brother     Cancer Paternal Grandfather         colon     Social History     Socioeconomic History    Marital status:      Spouse name: Not on file    Number of children: Not on file    Years of education: Not on file    Highest education level: Not on file   Occupational History    Not on file   Tobacco Use    Smoking status: Every Day     Current packs/day: 0.50     Average packs/day: 0.5 packs/day for 11.9 years (6.0 ttl pk-yrs)     Types: Cigarettes     Start date: 12/8/2012    Smokeless tobacco: Never   Vaping Use    Vaping status: Never Used   Substance and Sexual Activity    Alcohol use: Not Currently     Alcohol/week: 14.0 standard drinks of alcohol

## 2024-11-15 NOTE — PROGRESS NOTES
0815: Patient arrived to [facility] Recovery area. Patient is A&Ox4 on RA in NAD at this time. Code status, allergies, and NPO status reviewed with patient prior to procedure.      0925: Verbal order received from MD Ramirez for 100 mcg of fentanyl.     1125: TRANSFER - OUT REPORT:    Verbal report given to SHANIKA John on Arnoldo Hubbard  being transferred to PACU for routine post-op       Report consisted of patient's Situation, Background, Assessment and   Recommendations(SBAR).     Information from the following report(s) Nurse Handoff Report was reviewed with the receiving nurse.           Lines:   Peripheral IV 11/15/24 Proximal;Right;Anterior Forearm (Active)   Site Assessment Clean, dry & intact 11/15/24 1138   Line Status Infusing 11/15/24 1138   Line Care Connections checked and tightened;Line pulled back 11/15/24 1138   Phlebitis Assessment No symptoms 11/15/24 1138   Infiltration Assessment 0 11/15/24 1138   Alcohol Cap Used No 11/15/24 1138   Dressing Type Transparent 11/15/24 1138        Opportunity for questions and clarification was provided.      Patient transported with:  Registered Nurse    1234: TRANSFER - IN REPORT:    Verbal report received from SHANIKA Dallas on Arnoldo Hubbard  being received from PACU for routine post-op      Report consisted of patient's Situation, Background, Assessment and   Recommendations(SBAR).     Information from the following report(s) Nurse Handoff Report and MAR was reviewed with the receiving nurse.    Opportunity for questions and clarification was provided.      Assessment completed upon patient's arrival to unit and care assumed.      1345: Patient A&OX4 on RA in NAD with Laila of 10 at time of discharge. Discharge instructions reviewed with patient. Opportunity for questions and clarification given. Patient verbalized understanding.  All lines and telemetry removed prior to patient discharge.  Patient discharged with all documented belongings.

## 2024-11-15 NOTE — ANESTHESIA PRE PROCEDURE
patient.      Plan discussed with CRNA.                    Floyd Ramirez MD   11/15/2024

## 2024-11-21 RX ORDER — TRIAMCINOLONE ACETONIDE 1 MG/G
OINTMENT TOPICAL 2 TIMES DAILY
Qty: 1 EACH | Refills: 0 | Status: SHIPPED | OUTPATIENT
Start: 2024-11-21 | End: 2024-11-28

## 2024-11-21 RX ORDER — DOXYCYCLINE HYCLATE 100 MG
100 TABLET ORAL 2 TIMES DAILY
Qty: 14 TABLET | Refills: 0 | Status: SHIPPED | OUTPATIENT
Start: 2024-11-21 | End: 2024-11-28

## 2024-12-10 ENCOUNTER — TELEPHONE (OUTPATIENT)
Age: 59
End: 2024-12-10

## 2024-12-30 ENCOUNTER — OFFICE VISIT (OUTPATIENT)
Age: 59
End: 2024-12-30
Payer: COMMERCIAL

## 2024-12-30 ENCOUNTER — CLINICAL DOCUMENTATION (OUTPATIENT)
Age: 59
End: 2024-12-30

## 2024-12-30 VITALS
TEMPERATURE: 98.1 F | OXYGEN SATURATION: 96 % | HEART RATE: 75 BPM | DIASTOLIC BLOOD PRESSURE: 83 MMHG | WEIGHT: 229 LBS | HEIGHT: 68 IN | BODY MASS INDEX: 34.71 KG/M2 | SYSTOLIC BLOOD PRESSURE: 170 MMHG

## 2024-12-30 DIAGNOSIS — C78.7 RECTAL CANCER METASTASIZED TO LIVER (HCC): Primary | ICD-10-CM

## 2024-12-30 DIAGNOSIS — C20 RECTAL CANCER METASTASIZED TO LIVER (HCC): Primary | ICD-10-CM

## 2024-12-30 PROCEDURE — 99214 OFFICE O/P EST MOD 30 MIN: CPT | Performed by: INTERNAL MEDICINE

## 2024-12-30 RX ORDER — CAPECITABINE 150 MG/1
150 TABLET, FILM COATED ORAL 2 TIMES DAILY
Qty: 28 TABLET | Refills: 5 | Status: SHIPPED | OUTPATIENT
Start: 2024-12-30

## 2024-12-30 RX ORDER — CAPECITABINE 500 MG/1
2000 TABLET, FILM COATED ORAL 2 TIMES DAILY
Qty: 112 TABLET | Refills: 5 | Status: SHIPPED | OUTPATIENT
Start: 2024-12-30

## 2024-12-30 NOTE — PROGRESS NOTES
Oral Chemotherapy     Arnoldo Hubbard is a  59 y.o.male  diagnosed with rectal cancer . Mr. Hubbard is being treated with Xeloda.     Medication name: Capecitabine  Dose:  2150 mg (1000 mg/m2)  Frequency: twice daily   Administration schedule: days 1-14 of every 21 days cycle  Ordering provider:  Del  Start date: after PET scan     Mr. Hubbard verbalized understanding of the information he has been on the medication previously and has no questions.   Tess Hernandez, THOMASD, BCOP   For Pharmacy Admin Tracking Only    Program: Medical Group  CPA in place:  Yes  Recommendation Provided To: Patient/Caregiver: 1 via In person  Intervention Detail: New Rx: 1, reason: Needs Additional Therapy  Intervention Accepted By: Patient/Caregiver: 1   Time Spent (min): 5

## 2024-12-30 NOTE — PROGRESS NOTES
[facility] [place]  at [facility]  [address], [facility] [address], [address]  [place] [address]  [phone]        Oncology Consultation Note        Patient: Arnoldo Hubbard MRN: [id number]  SSN: [id number]    YOB: 1965  Age: 59 y.o.  Sex: male      Subjective:      Arnoldo Hubbard is a 59 y.o. male who *** menopausal female who has recently been diagnosed with *** breast cancer.   She has been under the care of  *** who performed a breast biopsy and established the diagnosis.   She noted a lump in her *** breast.   She saw *** and was sent for a mammogram.  An abnormal mammogram lead to a referral to *** who went on to perform a USG guided breast biopsy and an MRI of the breast.      The patient also mentions ***.  She denies ***.   She has lost *** weight.  She is accompanied by ***  She expressed papa in the healing power of god.        Review of Systems:    {Ros - complete:[id number]}        Past Medical History:   Diagnosis Date    Arthritis 2018    Colon cancer (HCC) 2022    Hypercholesterolemia     Psoriasis      Past Surgical History:   Procedure Laterality Date    ANTERIOR CERVICAL DISCECTOMY W/ FUSION      CATARACT EXTRACTION Bilateral     COLONOSCOPY N/A 08/17/2023    COLONOSCOPY performed by Evangelina Lopez MD at \[facility]\"" ENDOSCOPY    COLONOSCOPY N/A 10/10/2024    COLONOSCOPY performed by Evangelina Lopez MD at \[facility]\"" ENDOSCOPY    COLOSTOMY CLOSURE N/A 08/18/2023    ROBOTIC COLOSTOMY REVERSAL - CYSTOSCOPY URETERAL CATHETER INSERTION  (E R A S) performed by Evangelina Lopez MD at \[facility]\"" MAIN OR    CT RFA LIVER TUMOR(S) PERC  07/12/2024    CT RADIOFREQ ABLATION LIVER PERC 7/12/2024 \[facility]\"" RAD CT    CT RFA LIVER TUMOR(S) PERC  11/15/2024    CT RADIOFREQ ABLATION LIVER PERC 11/15/2024 \[facility]\"" RAD CT    HERNIA REPAIR      Laparoscopic inguinal hernia repair    IR PORT PLACEMENT > 5 YEARS  03/14/2023    IR PORT PLACEMENT EQUAL OR GREATER THAN 5 YEARS 3/14/2023 \[facility]\"" 
Arnoldo Hubbard is a 57 y.o. male here for follow up for colon carcinoma.  11/15/24 Procedure: CT- and US- guided liver mass biopsy and ablation      No concerns brought up.     1. Have you been to the ER, urgent care clinic since your last visit?  Hospitalized since your last visit?     2. Have you seen or consulted any other health care providers outside of the [facility] since your last visit?  Include any pap smears or colon screening.  Dr Avina 10/10 for colonoscopy  
Per TON from Dr. Mathis PET/CT TUMOR IMAGE SKULL/THIGH STAT    
solitary lesion in the liver.  S/P Thermal ablation 11/2024  Due to rising MRD, I am repeating PET CT. If there is no additional site of disease, Plan to start single agent Capecitabine.            Plan:       MRD testing in 3 months  PET CT  If PET is clean, start single agent [place]  Return in 4-6 weeks       Signed by: Jasson Mathis MD                     December 30, 2024          CC. Evangelina Lopez MD

## 2025-01-10 ENCOUNTER — TELEPHONE (OUTPATIENT)
Age: 60
End: 2025-01-10

## 2025-01-10 NOTE — TELEPHONE ENCOUNTER
Called to advised that pt Pet CT scan was denied. Advised to call [facility] [phone] case# [id number]. Please call as soon as possible because scan is scheduled for 14th.

## 2025-01-10 NOTE — TELEPHONE ENCOUNTER
Reviewed 's note    \"  Continue surveillance  CT in 05/01/2024 - no evidence of disease  Signatera MRD - +ve now.   PET CT - solitary focus of disease in the liver.   S/P thermal ablation 07/2024  Repeat MRD pending - slight uptick  Repeat PET - another solitary lesion in the liver.  S/P Thermal ablation 11/2024  Due to rising MRD, I am repeating PET CT. If there is no additional site of disease, Plan to start single agent Capecitabine. \"      Called [facility] to inquire what next step is needed to be taken.  Denied and closed on 1/8/25. Reconsideration needs to be directed back to health plan.    This RN called scheduling back and asked them to provide us the denial information and correct number to call for redetermination.    [phone]  It will ask for a Prefix which is  [id number]

## 2025-01-14 ENCOUNTER — HOSPITAL ENCOUNTER (OUTPATIENT)
Facility: HOSPITAL | Age: 60
Discharge: HOME | End: 2025-01-17
Attending: INTERNAL MEDICINE
Payer: COMMERCIAL

## 2025-01-14 VITALS — WEIGHT: 231 LBS | BODY MASS INDEX: 35.13 KG/M2

## 2025-01-14 DIAGNOSIS — C78.7 RECTAL CANCER METASTASIZED TO LIVER (HCC): ICD-10-CM

## 2025-01-14 DIAGNOSIS — C20 RECTAL CANCER METASTASIZED TO LIVER (HCC): ICD-10-CM

## 2025-01-14 LAB
GLUCOSE BLD STRIP.AUTO-MCNC: 100 MG/DL (ref 65–117)
SERVICE CMNT-IMP: NORMAL

## 2025-01-14 PROCEDURE — 78815 PET IMAGE W/CT SKULL-THIGH: CPT

## 2025-01-14 PROCEDURE — 3430000000 HC RX DIAGNOSTIC RADIOPHARMACEUTICAL: Performed by: INTERNAL MEDICINE

## 2025-01-14 PROCEDURE — 82962 GLUCOSE BLOOD TEST: CPT

## 2025-01-14 PROCEDURE — A9609 HC RX DIAGNOSTIC RADIOPHARMACEUTICAL: HCPCS | Performed by: INTERNAL MEDICINE

## 2025-01-14 RX ORDER — FLUDEOXYGLUCOSE F-18 500 MCI/ML
10 INJECTION INTRAVENOUS
Status: COMPLETED | OUTPATIENT
Start: 2025-01-14 | End: 2025-01-14

## 2025-01-14 RX ADMIN — FLUDEOXYGLUCOSE F-18 10 MILLICURIE: 500 INJECTION INTRAVENOUS at 11:46

## 2025-01-20 ENCOUNTER — TELEPHONE (OUTPATIENT)
Age: 60
End: 2025-01-20

## 2025-01-20 NOTE — TELEPHONE ENCOUNTER
Pt is inquiring about his PET scan results he recently had done. He states he is supposed to be receiving his chemo medication in the mail today and was told that  was going to review the results of the scan and advise if pt will start the rx.     Pt requests a call to discuss.

## 2025-01-22 ENCOUNTER — OFFICE VISIT (OUTPATIENT)
Age: 60
End: 2025-01-22
Payer: COMMERCIAL

## 2025-01-22 VITALS
TEMPERATURE: 98.1 F | DIASTOLIC BLOOD PRESSURE: 87 MMHG | HEART RATE: 76 BPM | HEIGHT: 68 IN | OXYGEN SATURATION: 91 % | WEIGHT: 226 LBS | BODY MASS INDEX: 34.25 KG/M2 | SYSTOLIC BLOOD PRESSURE: 144 MMHG

## 2025-01-22 DIAGNOSIS — C20 RECTAL CANCER METASTASIZED TO LIVER (HCC): Primary | ICD-10-CM

## 2025-01-22 DIAGNOSIS — C78.7 RECTAL CANCER METASTASIZED TO LIVER (HCC): Primary | ICD-10-CM

## 2025-01-22 PROCEDURE — 99215 OFFICE O/P EST HI 40 MIN: CPT | Performed by: INTERNAL MEDICINE

## 2025-01-22 NOTE — PROGRESS NOTES
Arnoldo Hubbard is a 57 y.o. male here for follow up for colon carcinoma.  Pt has Capecitabine.   1/14/25 PET CT  No changes to report.    1. Have you been to the ER, urgent care clinic since your last visit?  Hospitalized since your last visit? no    2. Have you seen or consulted any other health care providers outside of the [facility] since your last visit?  Include any pap smears or colon screening. no

## 2025-01-22 NOTE — PROGRESS NOTES
[facility]   at [facility]   [address], [facility] [address] [facility], [address]   [place] [address]   [phone]      Oncology Note        Patient: Arnoldo Hubbard  MRN: [id number]   SSN: [id number]    YOB: 1965                Diagnosis:       1. Colon adenocarcinoma, sigmoid and moderately differentiated: Dx: 12/21/22    T4 N0 M0 (Stage IIC)           Treatment:         1. Adjuvant chemotherapy    CapOx - 4 cycles   Cape 2 cycles               (Dose reduce to 1500 mg bid cycle 3)      Subjective:     Arnoldo Hubbard is a 57 y.o. male who I am seeing for a new diagnosis of colon carcinoma. He was noted to have diverticulitis which did not respond to antibiotics treatment. He underwent a left hemicolectomy. The pathology reveals colon adenocarcinoma. 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     Interval History:      He received adjuvant chemotherapy. He is remains asymptomatic. MRD test is positive. PET in June showed a solitary site of disease in the liver. She underwent a thermal ablation in July and Nov 2024 with Dr. Stoll. Repeat MRD in Aug shows an uptick. Repeat PET shows 3 additional lesions in the right hepatic lobe.          Review of Systems:      Constitutional: negative   Eyes: negative   Ears, Nose, Mouth, Throat, and Face: negative   Respiratory: negative   Cardiovascular: negative   Gastrointestinal: negative   Genitourinary:negative   Integument/Breast: negative   Hematologic/Lymphatic: negative   Musculoskeletal:negative   Neurological: negative       Review of systems was reviewed and updated as needed on 01/22/25.        Past Medical History:   Diagnosis Date    Arthritis 2018    Colon cancer (HCC) 2022    Hypercholesterolemia     Psoriasis        Past Surgical History:   Procedure Laterality Date    ANTERIOR CERVICAL DISCECTOMY W/ FUSION      CATARACT EXTRACTION Bilateral     COLONOSCOPY N/A 08/17/2023    COLONOSCOPY

## 2025-01-23 ENCOUNTER — TELEPHONE (OUTPATIENT)
Age: 60
End: 2025-01-23

## 2025-01-23 DIAGNOSIS — C78.7 RECTAL CANCER METASTASIZED TO LIVER (HCC): Primary | ICD-10-CM

## 2025-01-23 DIAGNOSIS — C20 RECTAL CANCER METASTASIZED TO LIVER (HCC): Primary | ICD-10-CM

## 2025-01-23 NOTE — TELEPHONE ENCOUNTER
discussed case with .   requested pt get a MRI liver done prior to him seeing him in the office.    This RN called pt.  He is aware.  His appt with  is 2/6.

## 2025-01-30 ENCOUNTER — CLINICAL DOCUMENTATION (OUTPATIENT)
Age: 60
End: 2025-01-30

## 2025-01-30 NOTE — PROGRESS NOTES
Called Pt regarding scheduling MRI. Pt has one scheduled at [facility] tomorrow. HIPAA verified X's 2.

## 2025-02-03 ENCOUNTER — TELEPHONE (OUTPATIENT)
Age: 60
End: 2025-02-03

## 2025-02-03 NOTE — TELEPHONE ENCOUNTER
Called and lm, if pt calls back Dr. Hudson has a case on Thursday and he needs to be moved to 2/10/25 at

## (undated) DEVICE — STAPLER INT DIA29MM CLS STPL H1.5-2.2MM OPN LEG L5.2MM 26

## (undated) DEVICE — SOLUTION SCRB 2OZ 10% POVIDONE IOD ANTIMIC BTL

## (undated) DEVICE — LAPAROSCOPIC TROCAR SLEEVE/SINGLE USE: Brand: KII® OPTICAL ACCESS SYSTEM

## (undated) DEVICE — DRAPE,ROBOTICS,STERILE: Brand: MEDLINE

## (undated) DEVICE — 3M™ IOBAN™ 2 ANTIMICROBIAL INCISE DRAPE 6648EZ: Brand: IOBAN™ 2

## (undated) DEVICE — ELECTRO LUBE IS A SINGLE PATIENT USE DEVICE THAT IS INTENDED TO BE USED ON ELECTROSURGICAL ELECTRODES TO REDUCE STICKING.: Brand: KEY SURGICAL ELECTRO LUBE

## (undated) DEVICE — IV START KIT: Brand: MEDLINE

## (undated) DEVICE — SET GRAV CK VLV NEEDLESS ST 3 GANGED 4WAY STPCOCK HI FLO 10

## (undated) DEVICE — ACCESS PLATFORM FOR MINIMALLY INVASIVE SURGERY: Brand: GELPOINT®  MINI ADVANCED ACCESS PLATFORM

## (undated) DEVICE — GUIDEWIRE ENDOSCP L150CM DIA0.035IN TIP L15CM BENT PTFE

## (undated) DEVICE — CONNECTOR CATH URET SIDE PRT FOR FRIC CONN UCA595] GU TEK]

## (undated) DEVICE — VESSEL SEALER: Brand: ENDOWRIST

## (undated) DEVICE — SUTURE SZ 0 27IN 5/8 CIR UR-6  TAPER PT VIOLET ABSRB VICRYL J603H

## (undated) DEVICE — SEAL

## (undated) DEVICE — SUTURE VCRL SZ 4-0 L27IN ABSRB UD L19MM PS-2 3/8 CIR PRIM J426H

## (undated) DEVICE — ENDOSCOPIC KIT COMPLIANCE ENDOKIT

## (undated) DEVICE — TUBING IRRIG L77IN DIA0.241IN L BOR FOR CYSTO W/ NVENT

## (undated) DEVICE — Device

## (undated) DEVICE — TBG INSUFFLATION LUER LOCK: Brand: MEDLINE INDUSTRIES, INC.

## (undated) DEVICE — STAPLER 60 RELOAD BLUE: Brand: SUREFORM

## (undated) DEVICE — 1LYRTR 16FR10ML100%SIL UMS SNP: Brand: MEDLINE INDUSTRIES, INC.

## (undated) DEVICE — SUTURE VCRL SZ 3-0 L27IN ABSRB UD L26MM SH 1/2 CIR J416H

## (undated) DEVICE — CANISTER, RIGID, 3000CC: Brand: MEDLINE INDUSTRIES, INC.

## (undated) DEVICE — GRASPER ENDOSCP TIP L10MM ANVIL ROT RATCH HNDL DISP

## (undated) DEVICE — PAD PT POS 36 IN SURGYPAD DISP

## (undated) DEVICE — COLON CLOSING PACK: Brand: MEDLINE INDUSTRIES, INC.

## (undated) DEVICE — STAPLER 60: Brand: SUREFORM

## (undated) DEVICE — CATHETER URET 5FR L70CM POLYUR WHSTL TIP W/ ADPT DISP

## (undated) DEVICE — 3M™ TEGADERM™ TRANSPARENT FILM DRESSING FRAME STYLE, 1626W, 4 IN X 4-3/4 IN (10 CM X 12 CM), 50/CT 4CT/CASE: Brand: 3M™ TEGADERM™

## (undated) DEVICE — GLOVE ORANGE PI 7   MSG9070

## (undated) DEVICE — CUFF BLD PRSS AD CLTH SGL TB W/ BAYNT CONN ROUNDED CORNER

## (undated) DEVICE — TOTAL TRAY, DB, 100% SILI FOLEY, 16FR 10: Brand: MEDLINE

## (undated) DEVICE — WRAP SURG W1.31XL1.34M CARD FOR PT 165-172CM THERMOWRP

## (undated) DEVICE — SUTURE PDS II SZ 2-0 L27IN ABSRB VLT SH L26MM 1/2 CIR Z317H

## (undated) DEVICE — AIRSEAL 5 MM ACCESS PORT AND LOW PROFILE OBTURATOR WITH BLADELESS OPTICAL TIP, 100 MM LENGTH: Brand: AIRSEAL

## (undated) DEVICE — UNIVERSAL STAPLER: Brand: ENDO GIA ULTRA

## (undated) DEVICE — TUBING, SUCTION, 1/4" X 10', STRAIGHT: Brand: MEDLINE

## (undated) DEVICE — TOWEL SURG W17XL27IN STD BLU COT NONFENESTRATED PREWASHED

## (undated) DEVICE — BASIN ST MAJOR-NO CAUTERY: Brand: MEDLINE INDUSTRIES, INC.

## (undated) DEVICE — GENERAL LAPAROSCOPY-MRMC: Brand: MEDLINE INDUSTRIES, INC.

## (undated) DEVICE — FORCEPS BX L240CM JAW DIA2.4MM ORNG L CAP W/ NDL DISP RAD

## (undated) DEVICE — JELLY,LUBE,STERILE,FLIP TOP,TUBE,4-OZ: Brand: MEDLINE

## (undated) DEVICE — SOLUTION IRRIG 3000ML 0.9% SOD CHL USP UROMATIC PLAS CONT

## (undated) DEVICE — COVER,MAYO STAND,STERILE: Brand: MEDLINE

## (undated) DEVICE — SOLUTION IRRIG 1000ML STRL H2O USP PLAS POUR BTL

## (undated) DEVICE — SUTURE VCRL SZ 0 L18IN ABSRB UD POLYGLACTIN 910 BRAID TIE J912G

## (undated) DEVICE — SUT VCRL 1 27IN CT VIO --

## (undated) DEVICE — MARKER,SKIN,WI/RULER AND LABELS: Brand: MEDLINE

## (undated) DEVICE — SUTURE PDS II SZ 0 L36IN ABSRB VLT L36MM CT-1 1/2 CIR Z346H

## (undated) DEVICE — SHEET,DRAPE,UNDERBUTTOCK,GRAD POUCH,PORT: Brand: MEDLINE

## (undated) DEVICE — GARMENT,MEDLINE,DVT,INT,CALF,MED, GEN2: Brand: MEDLINE

## (undated) DEVICE — SOLUTION IRRIG 1000ML 0.9% SOD CHL USP POUR PLAS BTL

## (undated) DEVICE — GLOVE SURG 7.5 PF POLYMER WHT STRL SIGN LTX ESSENTIAL LTX

## (undated) DEVICE — GLOVE SURG SZ 65 L12IN FNGR THK79MIL GRN LTX FREE

## (undated) DEVICE — HYPODERMIC SAFETY NEEDLE: Brand: MAGELLAN

## (undated) DEVICE — LEGGINGS, PAIR, 31X48, STERILE: Brand: MEDLINE

## (undated) DEVICE — ARM DRAPE

## (undated) DEVICE — CYSTO MRMC: Brand: MEDLINE INDUSTRIES, INC.

## (undated) DEVICE — GOWN,SIRUS,FABRNF,XL,20/CS: Brand: MEDLINE

## (undated) DEVICE — TIP COVER ACCESSORY

## (undated) DEVICE — PAD,NON-ADHERENT,3X8,STERILE,LF,1/PK: Brand: MEDLINE

## (undated) DEVICE — SUT ETHLN 3-0 18IN PS2 BLK --

## (undated) DEVICE — 4-PORT MANIFOLD: Brand: NEPTUNE 2

## (undated) DEVICE — SUTURE CHROMIC GUT SZ 3-0 L27IN ABSRB BRN L26MM SH 1/2 CIR G122H

## (undated) DEVICE — CATHETER DRNGE 32FR 4 WNG DISP FOR NEPHSTMY MALECOTS

## (undated) DEVICE — TRI-LUMEN FILTERED TUBE SET WITH ACTIVATED CHARCOAL FILTER: Brand: AIRSEAL

## (undated) DEVICE — GLOVE SURG SZ 65 THK91MIL LTX FREE SYN POLYISOPRENE

## (undated) DEVICE — PREP SKN CHLRAPRP APL 26ML STR --

## (undated) DEVICE — YANKAUER,POOLE TIP,STERILE,50/CS: Brand: MEDLINE

## (undated) DEVICE — RESERVOIR,SUCTION,100CC,SILICONE: Brand: MEDLINE

## (undated) DEVICE — TIP SUCT TRNSPAR RIB SURF STD BLB RIG NVENT W/ 5IN1 CONN DYND50138] MEDLINE INDUSTRIES INC]

## (undated) DEVICE — VISUALIZATION SYSTEM: Brand: CLEARIFY

## (undated) DEVICE — SPONGE GZ W4XL4IN COT 12 PLY TYP VII WVN C FLD DSGN STERILE

## (undated) DEVICE — BLADELESS OBTURATOR: Brand: WECK VISTA

## (undated) DEVICE — ARTICULATING RELOAD WITH TRI-STAPLE TECHNOLOGY: Brand: ENDO GIA

## (undated) DEVICE — SOLUTION ANTIFOG VIS SYS CLEARIFY LAPSCP

## (undated) DEVICE — CYSTO-SMH: Brand: MEDLINE INDUSTRIES, INC.

## (undated) DEVICE — REM POLYHESIVE ADULT PATIENT RETURN ELECTRODE: Brand: VALLEYLAB

## (undated) DEVICE — CYSTO/BLADDER IRRIGATION SET, REGULATING CLAMP

## (undated) DEVICE — SOLUTION IRRIGATION H2O 0797305] ICU MEDICAL INC]